# Patient Record
Sex: MALE | Race: WHITE | NOT HISPANIC OR LATINO
[De-identification: names, ages, dates, MRNs, and addresses within clinical notes are randomized per-mention and may not be internally consistent; named-entity substitution may affect disease eponyms.]

---

## 2017-05-25 ENCOUNTER — LABORATORY RESULT (OUTPATIENT)
Age: 77
End: 2017-05-25

## 2017-05-25 ENCOUNTER — APPOINTMENT (OUTPATIENT)
Dept: UROLOGY | Facility: CLINIC | Age: 77
End: 2017-05-25

## 2017-05-25 VITALS
DIASTOLIC BLOOD PRESSURE: 80 MMHG | HEIGHT: 67 IN | HEART RATE: 67 BPM | BODY MASS INDEX: 34.53 KG/M2 | RESPIRATION RATE: 16 BRPM | TEMPERATURE: 98 F | SYSTOLIC BLOOD PRESSURE: 149 MMHG | WEIGHT: 220 LBS

## 2017-05-25 RX ORDER — CLOTRIMAZOLE AND BETAMETHASONE DIPROPIONATE 10; .5 MG/G; MG/G
1-0.05 CREAM TOPICAL TWICE DAILY
Qty: 45 | Refills: 1 | Status: ACTIVE | COMMUNITY
Start: 2017-05-25 | End: 1900-01-01

## 2017-05-26 LAB
BASOPHILS # BLD AUTO: 0.01 K/UL
BASOPHILS NFR BLD AUTO: 0.1 %
EOSINOPHIL # BLD AUTO: 0.14 K/UL
EOSINOPHIL NFR BLD AUTO: 1.8 %
HBA1C MFR BLD HPLC: 7 %
HCT VFR BLD CALC: 38.5 %
HGB BLD-MCNC: 13.7 G/DL
IMM GRANULOCYTES NFR BLD AUTO: 0.3 %
LH SERPL-ACNC: 6.8 IU/L
LYMPHOCYTES # BLD AUTO: 1.22 K/UL
LYMPHOCYTES NFR BLD AUTO: 15.7 %
MAN DIFF?: NORMAL
MCHC RBC-ENTMCNC: 35.6 GM/DL
MCHC RBC-ENTMCNC: 38 PG
MCV RBC AUTO: 106.6 FL
MONOCYTES # BLD AUTO: 0.2 K/UL
MONOCYTES NFR BLD AUTO: 2.6 %
NEUTROPHILS # BLD AUTO: 6.16 K/UL
NEUTROPHILS NFR BLD AUTO: 79.5 %
PLATELET # BLD AUTO: 147 K/UL
RBC # BLD: 3.61 M/UL
RBC # FLD: 14.8 %
WBC # FLD AUTO: 7.75 K/UL

## 2017-06-01 ENCOUNTER — APPOINTMENT (OUTPATIENT)
Dept: UROLOGY | Facility: CLINIC | Age: 77
End: 2017-06-01

## 2017-06-01 LAB
APPEARANCE: CLEAR
BILIRUBIN URINE: NEGATIVE
BLOOD URINE: NEGATIVE
COLOR: YELLOW
FSH: 12 MIU/ML
GLUCOSE QUALITATIVE U: NORMAL MG/DL
KETONES URINE: NEGATIVE
LEUKOCYTE ESTERASE URINE: NEGATIVE
NITRITE URINE: NEGATIVE
PH URINE: 5
PROTEIN URINE: NEGATIVE MG/DL
SPECIFIC GRAVITY URINE: 1.01
TESTOST BND SERPL-MCNC: 6.4 PG/ML
TESTOST SERPL-MCNC: 1064 NG/DL
UROBILINOGEN URINE: NORMAL MG/DL

## 2017-08-24 ENCOUNTER — APPOINTMENT (OUTPATIENT)
Dept: UROLOGY | Facility: CLINIC | Age: 77
End: 2017-08-24
Payer: MEDICARE

## 2017-08-24 PROCEDURE — 99214 OFFICE O/P EST MOD 30 MIN: CPT

## 2017-08-27 LAB
APPEARANCE: CLEAR
BACTERIA UR CULT: NORMAL
BILIRUBIN URINE: NEGATIVE
BLOOD URINE: NEGATIVE
COLOR: YELLOW
GLUCOSE QUALITATIVE U: 1000 MG/DL
KETONES URINE: NEGATIVE
LEUKOCYTE ESTERASE URINE: NEGATIVE
NITRITE URINE: NEGATIVE
PH URINE: 5.5
PROTEIN URINE: NEGATIVE MG/DL
SPECIFIC GRAVITY URINE: 1.01
UROBILINOGEN URINE: NORMAL MG/DL

## 2017-09-06 ENCOUNTER — APPOINTMENT (OUTPATIENT)
Dept: UROLOGY | Facility: CLINIC | Age: 77
End: 2017-09-06
Payer: MEDICARE

## 2017-09-06 DIAGNOSIS — R79.89 OTHER SPECIFIED ABNORMAL FINDINGS OF BLOOD CHEMISTRY: ICD-10-CM

## 2017-09-06 DIAGNOSIS — N50.82 SCROTAL PAIN: ICD-10-CM

## 2017-09-06 PROCEDURE — 99214 OFFICE O/P EST MOD 30 MIN: CPT

## 2017-09-16 PROBLEM — R79.89 HIGH SERUM TESTOSTERONE: Status: ACTIVE | Noted: 2017-09-16

## 2017-09-16 PROBLEM — N50.82 SCROTAL PAIN: Status: ACTIVE | Noted: 2017-05-25

## 2017-09-16 LAB
ALBUMIN SERPL ELPH-MCNC: 3.4 G/DL
ALP BLD-CCNC: 121 U/L
ALT SERPL-CCNC: 19 U/L
ANION GAP SERPL CALC-SCNC: 17 MMOL/L
AST SERPL-CCNC: 33 U/L
BASOPHILS # BLD AUTO: 0.03 K/UL
BASOPHILS NFR BLD AUTO: 0.4 %
BILIRUB SERPL-MCNC: 0.8 MG/DL
BUN SERPL-MCNC: 17 MG/DL
CALCIUM SERPL-MCNC: 8.9 MG/DL
CHLORIDE SERPL-SCNC: 100 MMOL/L
CO2 SERPL-SCNC: 22 MMOL/L
CREAT SERPL-MCNC: 0.92 MG/DL
EOSINOPHIL # BLD AUTO: 0.19 K/UL
EOSINOPHIL NFR BLD AUTO: 2.5 %
GLUCOSE SERPL-MCNC: 173 MG/DL
HCT VFR BLD CALC: 40.2 %
HGB BLD-MCNC: 13.8 G/DL
IMM GRANULOCYTES NFR BLD AUTO: 0.1 %
LYMPHOCYTES # BLD AUTO: 1.4 K/UL
LYMPHOCYTES NFR BLD AUTO: 18.6 %
MAN DIFF?: NORMAL
MCHC RBC-ENTMCNC: 34.3 GM/DL
MCHC RBC-ENTMCNC: 35.9 PG
MCV RBC AUTO: 104.7 FL
MONOCYTES # BLD AUTO: 0.43 K/UL
MONOCYTES NFR BLD AUTO: 5.7 %
NEUTROPHILS # BLD AUTO: 5.45 K/UL
NEUTROPHILS NFR BLD AUTO: 72.7 %
PLATELET # BLD AUTO: 160 K/UL
POTASSIUM SERPL-SCNC: 4.3 MMOL/L
PROT SERPL-MCNC: 6.8 G/DL
PSA FREE FLD-MCNC: 22.7
PSA FREE SERPL-MCNC: 0.96 NG/ML
PSA SERPL-MCNC: 4.23 NG/ML
RBC # BLD: 3.84 M/UL
RBC # FLD: 15.4 %
SODIUM SERPL-SCNC: 139 MMOL/L
TESTOST SERPL-MCNC: 1026 NG/DL
WBC # FLD AUTO: 7.51 K/UL

## 2017-09-18 ENCOUNTER — MESSAGE (OUTPATIENT)
Age: 77
End: 2017-09-18

## 2018-07-06 ENCOUNTER — EMERGENCY (EMERGENCY)
Facility: HOSPITAL | Age: 78
LOS: 1 days | Discharge: HOME | End: 2018-07-06
Attending: EMERGENCY MEDICINE
Payer: MEDICARE

## 2018-07-06 VITALS
RESPIRATION RATE: 20 BRPM | OXYGEN SATURATION: 97 % | HEART RATE: 62 BPM | SYSTOLIC BLOOD PRESSURE: 131 MMHG | DIASTOLIC BLOOD PRESSURE: 82 MMHG

## 2018-07-06 VITALS
HEART RATE: 64 BPM | RESPIRATION RATE: 18 BRPM | DIASTOLIC BLOOD PRESSURE: 80 MMHG | TEMPERATURE: 98 F | OXYGEN SATURATION: 97 % | SYSTOLIC BLOOD PRESSURE: 163 MMHG

## 2018-07-06 DIAGNOSIS — E11.9 TYPE 2 DIABETES MELLITUS WITHOUT COMPLICATIONS: ICD-10-CM

## 2018-07-06 DIAGNOSIS — E16.0 DRUG-INDUCED HYPOGLYCEMIA WITHOUT COMA: ICD-10-CM

## 2018-07-06 LAB
APPEARANCE UR: CLEAR — SIGNIFICANT CHANGE UP
BASOPHILS # BLD AUTO: 0 K/UL — SIGNIFICANT CHANGE UP (ref 0–0.2)
BASOPHILS NFR BLD AUTO: 0.1 % — SIGNIFICANT CHANGE UP (ref 0–2)
BILIRUB UR-MCNC: NEGATIVE — SIGNIFICANT CHANGE UP
COLOR SPEC: YELLOW — SIGNIFICANT CHANGE UP
DIFF PNL FLD: NEGATIVE — SIGNIFICANT CHANGE UP
EOSINOPHIL # BLD AUTO: 0.1 K/UL — SIGNIFICANT CHANGE UP (ref 0–0.5)
EOSINOPHIL NFR BLD AUTO: 1.4 % — SIGNIFICANT CHANGE UP (ref 0–6)
EPI CELLS # UR: SIGNIFICANT CHANGE UP /HPF
GLUCOSE UR QL: 150 MG/DL
HCT VFR BLD CALC: 45.6 % — SIGNIFICANT CHANGE UP (ref 39–50)
HGB BLD-MCNC: 16 G/DL — SIGNIFICANT CHANGE UP (ref 13–17)
KETONES UR-MCNC: ABNORMAL
LEUKOCYTE ESTERASE UR-ACNC: NEGATIVE — SIGNIFICANT CHANGE UP
LYMPHOCYTES # BLD AUTO: 1 K/UL — SIGNIFICANT CHANGE UP (ref 1–3.3)
LYMPHOCYTES # BLD AUTO: 11.4 % — LOW (ref 13–44)
MAGNESIUM SERPL-MCNC: 2 MG/DL — SIGNIFICANT CHANGE UP (ref 1.6–2.6)
MCHC RBC-ENTMCNC: 35.1 GM/DL — SIGNIFICANT CHANGE UP (ref 32–36)
MCHC RBC-ENTMCNC: 38.9 PG — HIGH (ref 27–34)
MCV RBC AUTO: 111 FL — HIGH (ref 80–100)
MONOCYTES # BLD AUTO: 0.4 K/UL — SIGNIFICANT CHANGE UP (ref 0–0.9)
MONOCYTES NFR BLD AUTO: 5.2 % — SIGNIFICANT CHANGE UP (ref 2–14)
NEUTROPHILS # BLD AUTO: 7 K/UL — SIGNIFICANT CHANGE UP (ref 1.8–7.4)
NEUTROPHILS NFR BLD AUTO: 81.9 % — HIGH (ref 43–77)
NITRITE UR-MCNC: NEGATIVE — SIGNIFICANT CHANGE UP
PH UR: 6.5 — SIGNIFICANT CHANGE UP (ref 5–8)
PHOSPHATE SERPL-MCNC: 3.1 MG/DL — SIGNIFICANT CHANGE UP (ref 2.5–4.5)
PLATELET # BLD AUTO: 176 K/UL — SIGNIFICANT CHANGE UP (ref 150–400)
PROT UR-MCNC: SIGNIFICANT CHANGE UP
RBC # BLD: 4.12 M/UL — LOW (ref 4.2–5.8)
RBC # FLD: 14.2 % — SIGNIFICANT CHANGE UP (ref 10.3–14.5)
RBC CASTS # UR COMP ASSIST: SIGNIFICANT CHANGE UP /HPF (ref 0–2)
SP GR SPEC: 1.01 — SIGNIFICANT CHANGE UP (ref 1.01–1.02)
UROBILINOGEN FLD QL: NEGATIVE — SIGNIFICANT CHANGE UP
WBC # BLD: 8.6 K/UL — SIGNIFICANT CHANGE UP (ref 3.8–10.5)
WBC # FLD AUTO: 8.6 K/UL — SIGNIFICANT CHANGE UP (ref 3.8–10.5)
WBC UR QL: SIGNIFICANT CHANGE UP /HPF (ref 0–5)

## 2018-07-06 PROCEDURE — 83735 ASSAY OF MAGNESIUM: CPT

## 2018-07-06 PROCEDURE — 71045 X-RAY EXAM CHEST 1 VIEW: CPT

## 2018-07-06 PROCEDURE — 85027 COMPLETE CBC AUTOMATED: CPT

## 2018-07-06 PROCEDURE — 71045 X-RAY EXAM CHEST 1 VIEW: CPT | Mod: 26

## 2018-07-06 PROCEDURE — 99283 EMERGENCY DEPT VISIT LOW MDM: CPT | Mod: 25

## 2018-07-06 PROCEDURE — 81001 URINALYSIS AUTO W/SCOPE: CPT

## 2018-07-06 PROCEDURE — 99284 EMERGENCY DEPT VISIT MOD MDM: CPT | Mod: GC

## 2018-07-06 PROCEDURE — 99236 HOSP IP/OBS SAME DATE HI 85: CPT | Mod: GC

## 2018-07-06 PROCEDURE — 84100 ASSAY OF PHOSPHORUS: CPT

## 2018-07-06 PROCEDURE — 82962 GLUCOSE BLOOD TEST: CPT

## 2018-07-06 PROCEDURE — 87086 URINE CULTURE/COLONY COUNT: CPT

## 2018-07-06 PROCEDURE — 80053 COMPREHEN METABOLIC PANEL: CPT

## 2018-07-06 NOTE — ED PROVIDER NOTE - CARE PLAN
Principal Discharge DX:	Hypoglycemia  Assessment and plan of treatment:	1) Please follow-up with your endocrinologist and primary care doctor within the next 3 days.  Please call today or tomorrow for an appointment.  If you cannot follow-up with your doctor(s), please return to the ED for any urgent issues.  2) If you have any worsening of symptoms or any other concerns please return to the ED immediately.  3) Please continue taking your home medications as directed.  4) You may have been given a copy of your labs and/or imaging.  Please go over these with your primary care doctor.

## 2018-07-06 NOTE — ED PROVIDER NOTE - ATTENDING CONTRIBUTION TO CARE
Afebrile. Awake and Alert. Lungs CTA. Heart RRR. Abdomen soft NTND. CN II-XII grossly intact. Moves all extremities without lateralization.

## 2018-07-06 NOTE — CONSULT NOTE ADULT - ASSESSMENT
76 yo male with PMH  of colon CA on chemotherapy at [facility] [place] and immunotherapy with Xeloda, neuropathy, HTN, A-fib s/p ablation, T2DM on insulin, hypothyroidism, acromegaly on monthly Octreotide shot, was BIBEMS for episode of symptomatic hypoglycemia, FS: 73 checked by EMS, pt received glucose gel and orange juice by EMS and after arrival to ED he had orange juice and tuna sandwich with POC B and 265 with resolved symptoms of hypoglycemia     Episode of hypoglycemia in a pt with Hx of T2DM in the setting of higher than required insulin injection and lighter than usual oral intake   - decrease insulin Basaglar to 15 units in the morning, inject 8 units today   - decrease insulin Humulog to 6 units before breakfast and dinner   - sliding scale insulin as required, check FS before meal, if more than 150, for each 50 above 150 add 2 units of Humulog in addition to 6 units  - please follow up with your private Endocrinologist   - A1C goal is acceptable 7.5-8 78 yo male with PMH  of [place] CA [place] [facility] [place] with Xeloda, neuropathy, HTN, A-fib s/p ablation, T2DM on insulin, hypothyroidism, acromegaly on monthly Octreotide shot, was BIBEMS for episode of symptomatic hypoglycemia, FS: 73 checked by EMS, pt received glucose gel and orange juice by EMS and after arrival to ED he had orange juice and tuna sandwich with POC B and 265 with resolved symptoms of hypoglycemia

## 2018-07-06 NOTE — CONSULT NOTE ADULT - PROBLEM SELECTOR RECOMMENDATION 9
decrease insulin Basaglar to 15 units   decrease insulin Humulog to 6 units before breakfast and dinner   sliding scale insulin as required, check FS before meal, if more than 150, for each 50 above 150 add 2 units of Humulog in addition to 6 units Episode of hypoglycemia in a pt with Hx of T2DM in the setting of higher than required insulin injection and lighter than usual oral intake  -Extensive discussion on insulin based on carb intake. Recent meal had only fish ( only protein) and he bolused 9 units which caused his hypoglycemia. Advised to take insulin with his meals only if carbs are present.  -Will decrease total daily insulin dosing at this time.

## 2018-07-06 NOTE — CONSULT NOTE ADULT - SUBJECTIVE AND OBJECTIVE BOX
HPI:  78 yo male with PMH of colon CA on chemotherapy at [facility] [place] and immunotherapy with Xeloda, neuropathy, HTN, A-fib s/p ablation, T2DM on insulin, hypothyroidism, acromegaly on monthly Octreotide shot, was BIBEMS for episode of symptomatic hypoglycemia, as per wife by bedside, he was pale, weak with unclear speech, and was disoriented, initially refusing to take juice, later took a candy, however symptoms did not improve much and EMS was called. after arrival of EMS pt was given glucose gel and orange juice with B.    Pt states he had fish for dinner last night which is lighter than his usual dinner, injected 9 units of Humulog. he denies fever, chills, sore throat, runny nose, cough, abdominal pain, diarrhea, dysuria, sick contact.   Pt reports he has been diagnosed with diabetes about 4-5 years ago and started after chemotherapy. He follows with Endocrinologist Dr. Hu, is on insulin Basaglar 18 units AM and insulin Humulog 8-10 units before breakfast and before dinner. He checks his FS 1-2 times per day, and fasting reading are 100s-200s, barely gets numbers>300. Reports last hypoglycemia was about7.5 months ago and before that had multiple episodes of hypoglycemia. pt states his diet is ususaly rich in carbohydrates.  His last eye exam was 2018 and normal retina per pt.   after arrival to ED he had orange juice and tuna sandwich, POC B and 265. at time of writer's interview pt reports resolution of his symptoms, A&O x 3, denies headache, blurry vision, tremor, difficulty with speech.       PAST MEDICAL & SURGICAL HISTORY:  Neuropathy  Hypothyroidism  Type 2 diabetes mellitus  Hypertension  Colon cancer      FAMILY HISTORY: noncontributory       Social History: denies smoking, drugs, drinking     Outpatient Medications: insulin Basaglar 18 units AM, Humulog 8-10 units AM & PM, Amlodipine, lasix, Lopressor, Synthroid, Vitamin B6,  potassium, magnesium, Octreotide injection     MEDICATIONS  (STANDING):    MEDICATIONS  (PRN):      Allergies    Allergy Status Unknown    Intolerances      Review of Systems:  Constitutional: No fever  Eyes: No blurry vision  Neuro: No tremors  HEENT: No pain  Cardiovascular: No chest pain, palpitations  Respiratory: No SOB, no cough  GI: No nausea, vomiting, abdominal pain  : No dysuria  Skin: no rash  Psych: no depression  Endocrine: no polyuria, polydipsia  Hem/lymph: no swelling  Osteoporosis: no fractures    ALL OTHER SYSTEMS REVIEWED AND NEGATIVE    UNABLE TO OBTAIN    PHYSICAL EXAM:  VITALS: T(C): 36.4 (18 @ 07:23)  T(F): 97.6 (18 @ 07:23), Max: 97.6 (18 @ 06:30)  HR: 59 (18 @ 07:23) (59 - 64)  BP: 135/72 (18 @ 07:23) (131/82 - 135/72)  RR:  (16 - 20)  SpO2:  (97% - 100%)  Wt(kg): --  GENERAL: NAD, well-groomed, well-developed  EYES: No proptosis, no lid lag, anicteric  HEENT:  Atraumatic, Normocephalic, moist mucous membranes  THYROID: Normal size, no palpable nodules  RESPIRATORY: Clear to auscultation bilaterally; No rales, rhonchi, wheezing, or rubs  CARDIOVASCULAR: Regular rate and rhythm; No murmurs; no peripheral edema  GI: Soft, nontender, non distended, normal bowel sounds, + abdominal hernia, + chemo port   SKIN: Dry, intact, No rashes or lesions  MUSCULOSKELETAL: Full range of motion, normal strength  NEURO: sensation intact, extraocular movements intact, no tremor, normal reflexes  PSYCH: Alert and oriented x 3, normal affect, normal mood  CUSHING'S SIGNS: no striae    POCT Blood Glucose.: 265 mg/dL (18 @ 10:24)  POCT Blood Glucose.: 179 mg/dL (18 @ 06:30)  POCT Blood Glucose.: 178 mg/dL (18 @ 06:28)                            16.0   8.6   )-----------( 176      ( 2018 07:34 )             45.6       -    138  |  99  |  18  ----------------------------<  206<H>  3.9   |  24  |  0.99    EGFR if : 84  EGFR if non : 73    Ca    8.9      -06  Mg     2.0     -  Phos  3.1     07-    TPro  6.7  /  Alb  3.2<L>  /  TBili  0.7  /  DBili  x   /  AST  35  /  ALT  20  /  AlkPhos  132<H>  -      Thyroid Function Tests:              Radiology: HPI:  76 yo male with PMH of colon CA on chemotherapy at [facility] [place] and immunotherapy with Xeloda, neuropathy, HTN, A-fib s/p ablation, T2DM on insulin, hypothyroidism, acromegaly on monthly Octreotide shot, was BIBEMS for episode of symptomatic hypoglycemia, as per wife by bedside, he was pale, weak with unclear speech, and was disoriented, initially refusing to take juice, later took a candy, however symptoms did not improve much and EMS was called. after arrival of EMS pt was given glucose gel and orange juice with B.    Pt states he had fish for dinner last night which is lighter than his usual dinner, injected 9 units of Humulog. he denies fever, chills, sore throat, runny nose, cough, abdominal pain, diarrhea, dysuria, sick contact.   Pt reports he has been diagnosed with diabetes about 4-5 years ago and started after chemotherapy. He follows with Endocrinologist Dr. Hu, is on insulin Basaglar 18 units AM and insulin Humulog 8-10 units before breakfast and before dinner. He checks his FS 1-2 times per day, and fasting reading are 100s-200s, barely gets numbers>300. Reports last hypoglycemia was about7.5 months ago and before that had multiple episodes of hypoglycemia. pt states his diet is ususaly rich in carbohydrates.  His last eye exam was 2018 and normal retina per pt.   after arrival to ED he had orange juice and tuna sandwich, POC B and 265. at time of writer's interview pt reports resolution of his symptoms, A&O x 3, denies headache, blurry vision, tremor, difficulty with speech.       PAST MEDICAL & SURGICAL HISTORY:  Neuropathy  Hypothyroidism  Type 2 diabetes mellitus  Hypertension  Colon cancer      FAMILY HISTORY: noncontributory       Social History: denies smoking, drugs, drinking     Outpatient Medications: insulin Basaglar 18 units AM, Humulog 8-10 units AM & PM, Amlodipine, lasix, Lopressor, Synthroid, Vitamin B6,  potassium, magnesium, Octreotide injection     MEDICATIONS  (STANDING):    MEDICATIONS  (PRN):      Allergies    Allergy Status Unknown    Intolerances      Review of Systems:  Constitutional: No fever  Eyes: No blurry vision  Neuro: No tremors  HEENT: No pain  Cardiovascular: No chest pain, palpitations  Respiratory: No SOB, no cough  GI: No nausea, vomiting, abdominal pain  : No dysuria  Skin: no rash  Psych: no depression  Endocrine: no polyuria, polydipsia  Hem/lymph: no swelling  Osteoporosis: no fractures    ALL OTHER SYSTEMS REVIEWED AND NEGATIVE    UNABLE TO OBTAIN    PHYSICAL EXAM:  VITALS: T(C): 36.4 (18 @ 07:23)  T(F): 97.6 (18 @ 07:23), Max: 97.6 (18 @ 06:30)  HR: 59 (18 @ 07:23) (59 - 64)  BP: 135/72 (18 @ 07:23) (131/82 - 135/72)  RR:  (16 - 20)  SpO2:  (97% - 100%)  Wt(kg): --  GENERAL: NAD, well-groomed, well-developed  EYES: No proptosis, no lid lag, anicteric  HEENT:  Atraumatic, Normocephalic, moist mucous membranes  THYROID: Normal size, no palpable nodules  RESPIRATORY: Clear to auscultation bilaterally; No rales, rhonchi, wheezing, or rubs  CARDIOVASCULAR: Regular rate and rhythm; No murmurs; no peripheral edema  GI: Soft, nontender, non distended, normal bowel sounds, + abdominal hernia, + chemo port   SKIN: Dry, intact, No rashes or lesions  MUSCULOSKELETAL: Full range of motion, normal strength  NEURO: sensation intact, extraocular movements intact, no tremor, normal reflexes  PSYCH: Alert and oriented x 3, normal affect, normal mood  CUSHING'S SIGNS: no striae    POCT Blood Glucose.: 265 mg/dL (18 @ 10:24)  POCT Blood Glucose.: 179 mg/dL (18 @ 06:30)  POCT Blood Glucose.: 178 mg/dL (18 @ 06:28)                            16.0   8.6   )-----------( 176      ( 2018 07:34 )             45.6       -    138  |  99  |  18  ----------------------------<  206<H>  3.9   |  24  |  0.99    EGFR if : 84  EGFR if non : 73    Ca    8.9      -06  Mg     2.0     -  Phos  3.1     -    TPro  6.7  /  Alb  3.2<L>  /  TBili  0.7  /  DBili  x   /  AST  35  /  ALT  20  /  AlkPhos  132<H>

## 2018-07-06 NOTE — ED ADULT NURSE REASSESSMENT NOTE - GENERAL PATIENT STATE
comfortable appearance/cooperative/smiling/interactive
family/SO at bedside/cooperative/comfortable appearance/improvement verbalized/smiling/interactive

## 2018-07-06 NOTE — ED PROVIDER NOTE - OBJECTIVE STATEMENT
Endocrinologist: Dr. Hu   77 y.o. M with PMH colon CA on [place] [facility] [place] and immunotherapy with Xeloda, T2DM on insulin, hypothyroidism, HTN, neuropathy, Afib s/p ablation  who was brought to ED [facility] for hypoglycemia. Per pt's wife, patient was moaning in his sleep; upon talking to him, he was noted to slur his words and seem disoriented. She attempted to give him orange juice, but he refused; he did finally take spearmint with some sugar, but his mental status did not change, which prompted him to call 911. EMS administered glucose gel and orange juice; BG increased to 73. In triage, patient was noted to have . Patient currently denies symptoms of hypoglycemia. He is mentating well. He denies recent changes in his insulin regimen (Basaglar 18 u qam, humalog 8-10 u qam and q pm) or initiation of new medications. He denies having skipped any meals yesterday.

## 2018-07-06 NOTE — ED PROVIDER NOTE - PLAN OF CARE
1) Please follow-up with your endocrinologist and primary care doctor within the next 3 days.  Please call today or tomorrow for an appointment.  If you cannot follow-up with your doctor(s), please return to the ED for any urgent issues.  2) If you have any worsening of symptoms or any other concerns please return to the ED immediately.  3) Please continue taking your home medications as directed.  4) You may have been given a copy of your labs and/or imaging.  Please go over these with your primary care doctor.

## 2018-07-06 NOTE — ED PROVIDER NOTE - PHYSICAL EXAMINATION
General: WN/WD NAD  Neurology: A&Ox3, nonfocal, GRAMAJO x 4  Eyes: PERRLA/ EOMI, Gross vision intact  ENT/Neck: Neck supple, trachea midline, No JVD, Gross hearing intact  Respiratory: CTA B/L, No wheezing, rales, rhonchi  CV: RRR, S1S2, no murmurs, rubs or gallops  Abdominal: Soft, NT, ND +BS,   Extremities: No edema, + peripheral pulses  Skin: No Rashes, Hematoma, Ecchymosis

## 2018-07-06 NOTE — ED PROVIDER NOTE - PROGRESS NOTE DETAILS
Shubham OVALLES: pt signed out to me. endocrine paged Shubham OVALLES: endocrine repaged. GORDON OVALLES: endocrine at bedside. pt signed out to me by dr larios. updated on lab and imaging results, given copy, to f/u with pmd and discuss. no further episodes hypoglycemia. pt feels well and wishes to b3e d/c'd. seen by endocrine, endocrine relayed changes to insulin regimen, pt is aware and will begin, will also f/u with his endocrinologist. discussed return precautions. an opportunity to ask questions was provided and all answered.  stable for d/c. - Robert Lorenzana MD

## 2018-07-06 NOTE — ED ADULT NURSE NOTE - CHPI ED SYMPTOMS NEG
no loss of consciousness/no dizziness/no fever/no decreased eating/drinking/no chills/no headache/no pain/no vomiting/no nausea/no back pain

## 2018-07-06 NOTE — ED ADULT NURSE NOTE - OBJECTIVE STATEMENT
76 yo M arriving via EMS, w/ hx of diabetes, his wife reports he was moaning in his sleep, and when she tried to wake him he was not making sense. She suspected his blood sugar was low, so she gave him a cup of orange juice which he was able to drink and called EMS. EMS reports that on their arrival pt was incoherent, finger stick 73, administered one tube of oral glucose during transport. On arrival pt A&Ox3, denies CP, SOB, N/V, abdominal pain, constipation, diarrhea, fever, chills. Finger stick on arrival 178. Pt checks blood sugar daily, has had no recent change in medication. No recent change in PO intake. Pt undergoing chemo, last treatment Thursday, has treatment every 2 weeks. VSS. Wife at bedside. 78 yo M arriving via EMS, w/ hx of diabetes, his wife reports he was moaning in his sleep, and when she tried to wake him he was not making sense. She suspected his blood sugar was low, so she gave him a cup of orange juice which he was able to drink and called EMS. EMS reports that on their arrival pt was incoherent, finger stick 73, administered one tube of oral glucose during transport. On arrival pt A&Ox3, denies CP, SOB, N/V, abdominal pain, constipation, diarrhea, fever, chills. Finger stick on arrival 178. Pt checks blood sugar daily, has had no recent change in medication. No recent change in PO intake. Pt undergoing chemo for colon cancer, last treatment one week ago, has treatment every two weeks. VSS. Wife at bedside.

## 2018-07-06 NOTE — ED ADULT NURSE REASSESSMENT NOTE - NS ED NURSE REASSESS COMMENT FT1
Denies lightheadedness, dizziness, weakness, CP, SOB, N/V/D urinary/bowel complications, fever/chills. Pt is in no current distress. Comfort and safety provided. Will continue to monitor.

## 2018-07-06 NOTE — CONSULT NOTE ADULT - PROBLEM SELECTOR RECOMMENDATION 2
decrease insulin Basaglar to 15 units   decrease insulin Humulog to 6 units before breakfast and dinner   sliding scale insulin as required, check FS before meal, if more than 150, for each   follow up with your Endocrinologist  A1C goal would be acceptable 7.5-8 -decrease insulin Basaglar to 15 units   -decrease insulin Humalog to 6 units before breakfast and dinner   sliding scale insulin as required, check FS before meal, if more than 150, for each 50 above 150 add 1 units of Humalog in addition to 6 units  - Follow up with your private Endocrinologist ( Dr. Bonner)   - A1C goal is acceptable 7.5-8

## 2018-07-07 LAB
CULTURE RESULTS: SIGNIFICANT CHANGE UP
SPECIMEN SOURCE: SIGNIFICANT CHANGE UP

## 2019-02-13 ENCOUNTER — EMERGENCY (EMERGENCY)
Facility: HOSPITAL | Age: 79
LOS: 1 days | Discharge: HOME | End: 2019-02-13
Attending: EMERGENCY MEDICINE
Payer: MEDICARE

## 2019-02-13 VITALS
HEART RATE: 68 BPM | DIASTOLIC BLOOD PRESSURE: 66 MMHG | TEMPERATURE: 98 F | RESPIRATION RATE: 16 BRPM | OXYGEN SATURATION: 96 % | SYSTOLIC BLOOD PRESSURE: 133 MMHG

## 2019-02-13 VITALS
RESPIRATION RATE: 18 BRPM | OXYGEN SATURATION: 96 % | HEIGHT: 66 IN | HEART RATE: 68 BPM | WEIGHT: 179.9 LBS | DIASTOLIC BLOOD PRESSURE: 80 MMHG | SYSTOLIC BLOOD PRESSURE: 147 MMHG

## 2019-02-13 PROBLEM — E11.9 TYPE 2 DIABETES MELLITUS WITHOUT COMPLICATIONS: Chronic | Status: ACTIVE | Noted: 2018-07-06

## 2019-02-13 PROBLEM — G62.9 POLYNEUROPATHY, UNSPECIFIED: Chronic | Status: ACTIVE | Noted: 2018-07-06

## 2019-02-13 PROBLEM — E03.9 HYPOTHYROIDISM, UNSPECIFIED: Chronic | Status: ACTIVE | Noted: 2018-07-06

## 2019-02-13 PROBLEM — I10 ESSENTIAL (PRIMARY) HYPERTENSION: Chronic | Status: ACTIVE | Noted: 2018-07-06

## 2019-02-13 LAB
ALBUMIN SERPL ELPH-MCNC: 3.3 G/DL — SIGNIFICANT CHANGE UP (ref 3.3–5)
ALP SERPL-CCNC: 128 U/L — HIGH (ref 40–120)
ALT FLD-CCNC: 20 U/L — SIGNIFICANT CHANGE UP (ref 10–45)
ANION GAP SERPL CALC-SCNC: 11 MMOL/L — SIGNIFICANT CHANGE UP (ref 5–17)
APPEARANCE UR: CLEAR — SIGNIFICANT CHANGE UP
AST SERPL-CCNC: 26 U/L — SIGNIFICANT CHANGE UP (ref 10–40)
B-OH-BUTYR SERPL-SCNC: 0.1 MMOL/L — SIGNIFICANT CHANGE UP
BACTERIA # UR AUTO: NEGATIVE — SIGNIFICANT CHANGE UP
BASE EXCESS BLDV CALC-SCNC: 2 MMOL/L — SIGNIFICANT CHANGE UP (ref -2–2)
BASOPHILS # BLD AUTO: 0 K/UL — SIGNIFICANT CHANGE UP (ref 0–0.2)
BASOPHILS NFR BLD AUTO: 0.7 % — SIGNIFICANT CHANGE UP (ref 0–2)
BILIRUB SERPL-MCNC: 0.6 MG/DL — SIGNIFICANT CHANGE UP (ref 0.2–1.2)
BILIRUB UR-MCNC: NEGATIVE — SIGNIFICANT CHANGE UP
BUN SERPL-MCNC: 15 MG/DL — SIGNIFICANT CHANGE UP (ref 7–23)
CA-I SERPL-SCNC: 1.2 MMOL/L — SIGNIFICANT CHANGE UP (ref 1.12–1.3)
CALCIUM SERPL-MCNC: 8.9 MG/DL — SIGNIFICANT CHANGE UP (ref 8.4–10.5)
CHLORIDE BLDV-SCNC: 107 MMOL/L — SIGNIFICANT CHANGE UP (ref 96–108)
CHLORIDE SERPL-SCNC: 105 MMOL/L — SIGNIFICANT CHANGE UP (ref 96–108)
CO2 BLDV-SCNC: 27 MMOL/L — SIGNIFICANT CHANGE UP (ref 22–30)
CO2 SERPL-SCNC: 23 MMOL/L — SIGNIFICANT CHANGE UP (ref 22–31)
COLOR SPEC: SIGNIFICANT CHANGE UP
CREAT SERPL-MCNC: 0.75 MG/DL — SIGNIFICANT CHANGE UP (ref 0.5–1.3)
DIFF PNL FLD: NEGATIVE — SIGNIFICANT CHANGE UP
EOSINOPHIL # BLD AUTO: 0.2 K/UL — SIGNIFICANT CHANGE UP (ref 0–0.5)
EOSINOPHIL NFR BLD AUTO: 3.1 % — SIGNIFICANT CHANGE UP (ref 0–6)
EPI CELLS # UR: 0 /HPF — SIGNIFICANT CHANGE UP
GAS PNL BLDV: 136 MMOL/L — SIGNIFICANT CHANGE UP (ref 136–145)
GAS PNL BLDV: SIGNIFICANT CHANGE UP
GAS PNL BLDV: SIGNIFICANT CHANGE UP
GLUCOSE BLDV-MCNC: 119 MG/DL — HIGH (ref 70–99)
GLUCOSE SERPL-MCNC: 110 MG/DL — HIGH (ref 70–99)
GLUCOSE UR QL: ABNORMAL
HCO3 BLDV-SCNC: 26 MMOL/L — SIGNIFICANT CHANGE UP (ref 21–29)
HCT VFR BLD CALC: 42.1 % — SIGNIFICANT CHANGE UP (ref 39–50)
HCT VFR BLDA CALC: 43 % — SIGNIFICANT CHANGE UP (ref 39–50)
HGB BLD CALC-MCNC: 14 G/DL — SIGNIFICANT CHANGE UP (ref 13–17)
HGB BLD-MCNC: 14.8 G/DL — SIGNIFICANT CHANGE UP (ref 13–17)
HYALINE CASTS # UR AUTO: 0 /LPF — SIGNIFICANT CHANGE UP (ref 0–2)
KETONES UR-MCNC: NEGATIVE — SIGNIFICANT CHANGE UP
LACTATE BLDV-MCNC: 1.7 MMOL/L — SIGNIFICANT CHANGE UP (ref 0.7–2)
LEUKOCYTE ESTERASE UR-ACNC: NEGATIVE — SIGNIFICANT CHANGE UP
LYMPHOCYTES # BLD AUTO: 1.2 K/UL — SIGNIFICANT CHANGE UP (ref 1–3.3)
LYMPHOCYTES # BLD AUTO: 15.8 % — SIGNIFICANT CHANGE UP (ref 13–44)
MCHC RBC-ENTMCNC: 35.1 GM/DL — SIGNIFICANT CHANGE UP (ref 32–36)
MCHC RBC-ENTMCNC: 36.3 PG — HIGH (ref 27–34)
MCV RBC AUTO: 103 FL — HIGH (ref 80–100)
MONOCYTES # BLD AUTO: 0.8 K/UL — SIGNIFICANT CHANGE UP (ref 0–0.9)
MONOCYTES NFR BLD AUTO: 10.3 % — SIGNIFICANT CHANGE UP (ref 2–14)
NEUTROPHILS # BLD AUTO: 5.1 K/UL — SIGNIFICANT CHANGE UP (ref 1.8–7.4)
NEUTROPHILS NFR BLD AUTO: 70 % — SIGNIFICANT CHANGE UP (ref 43–77)
NITRITE UR-MCNC: NEGATIVE — SIGNIFICANT CHANGE UP
PCO2 BLDV: 40 MMHG — SIGNIFICANT CHANGE UP (ref 35–50)
PH BLDV: 7.43 — SIGNIFICANT CHANGE UP (ref 7.35–7.45)
PH UR: 7.5 — SIGNIFICANT CHANGE UP (ref 5–8)
PLATELET # BLD AUTO: 170 K/UL — SIGNIFICANT CHANGE UP (ref 150–400)
PO2 BLDV: 68 MMHG — HIGH (ref 25–45)
POTASSIUM BLDV-SCNC: 3.2 MMOL/L — LOW (ref 3.5–5.3)
POTASSIUM SERPL-MCNC: 3.6 MMOL/L — SIGNIFICANT CHANGE UP (ref 3.5–5.3)
POTASSIUM SERPL-SCNC: 3.6 MMOL/L — SIGNIFICANT CHANGE UP (ref 3.5–5.3)
PROT SERPL-MCNC: 6.1 G/DL — SIGNIFICANT CHANGE UP (ref 6–8.3)
PROT UR-MCNC: ABNORMAL
RBC # BLD: 4.07 M/UL — LOW (ref 4.2–5.8)
RBC # FLD: 14 % — SIGNIFICANT CHANGE UP (ref 10.3–14.5)
RBC CASTS # UR COMP ASSIST: 1 /HPF — SIGNIFICANT CHANGE UP (ref 0–4)
SAO2 % BLDV: 93 % — HIGH (ref 67–88)
SODIUM SERPL-SCNC: 139 MMOL/L — SIGNIFICANT CHANGE UP (ref 135–145)
SP GR SPEC: 1.01 — SIGNIFICANT CHANGE UP (ref 1.01–1.02)
UROBILINOGEN FLD QL: NEGATIVE — SIGNIFICANT CHANGE UP
WBC # BLD: 7.3 K/UL — SIGNIFICANT CHANGE UP (ref 3.8–10.5)
WBC # FLD AUTO: 7.3 K/UL — SIGNIFICANT CHANGE UP (ref 3.8–10.5)
WBC UR QL: 0 /HPF — SIGNIFICANT CHANGE UP (ref 0–5)

## 2019-02-13 PROCEDURE — 82010 KETONE BODYS QUAN: CPT

## 2019-02-13 PROCEDURE — 80053 COMPREHEN METABOLIC PANEL: CPT

## 2019-02-13 PROCEDURE — 82947 ASSAY GLUCOSE BLOOD QUANT: CPT

## 2019-02-13 PROCEDURE — 85027 COMPLETE CBC AUTOMATED: CPT

## 2019-02-13 PROCEDURE — 99283 EMERGENCY DEPT VISIT LOW MDM: CPT | Mod: 25

## 2019-02-13 PROCEDURE — 82435 ASSAY OF BLOOD CHLORIDE: CPT

## 2019-02-13 PROCEDURE — 93005 ELECTROCARDIOGRAM TRACING: CPT

## 2019-02-13 PROCEDURE — 82803 BLOOD GASES ANY COMBINATION: CPT

## 2019-02-13 PROCEDURE — 85014 HEMATOCRIT: CPT

## 2019-02-13 PROCEDURE — 82565 ASSAY OF CREATININE: CPT

## 2019-02-13 PROCEDURE — 96372 THER/PROPH/DIAG INJ SC/IM: CPT

## 2019-02-13 PROCEDURE — 84132 ASSAY OF SERUM POTASSIUM: CPT

## 2019-02-13 PROCEDURE — 82962 GLUCOSE BLOOD TEST: CPT

## 2019-02-13 PROCEDURE — 99284 EMERGENCY DEPT VISIT MOD MDM: CPT | Mod: GC,25

## 2019-02-13 PROCEDURE — 83605 ASSAY OF LACTIC ACID: CPT

## 2019-02-13 PROCEDURE — 82330 ASSAY OF CALCIUM: CPT

## 2019-02-13 PROCEDURE — 81001 URINALYSIS AUTO W/SCOPE: CPT

## 2019-02-13 PROCEDURE — 84295 ASSAY OF SERUM SODIUM: CPT

## 2019-02-13 PROCEDURE — 93010 ELECTROCARDIOGRAM REPORT: CPT | Mod: GC

## 2019-02-13 RX ORDER — INSULIN GLARGINE 100 [IU]/ML
15 INJECTION, SOLUTION SUBCUTANEOUS ONCE
Qty: 0 | Refills: 0 | Status: COMPLETED | OUTPATIENT
Start: 2019-02-13 | End: 2019-02-13

## 2019-02-13 RX ORDER — INSULIN GLARGINE 100 [IU]/ML
18 INJECTION, SOLUTION SUBCUTANEOUS ONCE
Qty: 0 | Refills: 0 | Status: DISCONTINUED | OUTPATIENT
Start: 2019-02-13 | End: 2019-02-13

## 2019-02-13 RX ORDER — SODIUM CHLORIDE 9 MG/ML
1000 INJECTION INTRAMUSCULAR; INTRAVENOUS; SUBCUTANEOUS ONCE
Qty: 0 | Refills: 0 | Status: COMPLETED | OUTPATIENT
Start: 2019-02-13 | End: 2019-02-13

## 2019-02-13 RX ADMIN — SODIUM CHLORIDE 1333.33 MILLILITER(S): 9 INJECTION INTRAMUSCULAR; INTRAVENOUS; SUBCUTANEOUS at 06:13

## 2019-02-13 RX ADMIN — INSULIN GLARGINE 15 UNIT(S): 100 INJECTION, SOLUTION SUBCUTANEOUS at 07:55

## 2019-02-13 NOTE — ED ADULT NURSE NOTE - OBJECTIVE STATEMENT
77 y/o male A&Ox4, PMH DMII, colon ca, HTN, neuropathy, hypothyroid, BIBEMS s/p possible hypoglycemic episode as per wife at home. As per EMS and wife bedside, pt was getting up to go to early morning cancer treatment appointment when he became confused and weak. Wife presumed low blood sugar and preceded to give her  "2 sugar candies." Upon EMS arrival, pt was alert, FS performed on scene was in the 400s. Pt states FS last night before going to bed was high also in the 400s. Upon initial assessment, airway patent and intact, denies chest pain/SOB. ABD soft nontender, denies n/v/d. Denies blood in urine and/or stool. Skin intact. Peripheral pulses strong and normal baseline sensation present x4. Safety and comfort measures maintained.

## 2019-02-13 NOTE — ED PROVIDER NOTE - PROGRESS NOTE DETAILS
Attending Supa Hale DO: Glucose rapidly downtrending 400(ems) -> 200 (FS here) ->119 (VBG). This likely represents initial sugar spike from sugar candy. Pt not in DKA. Pt given sandwich and orange juice achieve longer lasting sugar source. Pt doses for home AM lantas. Attending Supa Hale DO: Glucose rapidly downtrending 400(ems) -> 200 (FS here) ->119 (VBG). This likely represents initial sugar spike from sugar candy. Pt not in DKA. Pt given sandwich and orange juice to achieve longer lasting blood sugar level. Pt dosed for home AM lantas. Stanislaw - s/o to me from Dr Dill. On my assessment pt well appearing, at baseline mental status, eating. Ready for home dose am lantus 15u, will recheck FSBG if maintaining euglycemia will dc [place] [facility] [place] discussed return precautions for new/worsening symptoms, f/u outpt, they are agreeable. FSBG stable, maintaining euglycemia stable for discharge.

## 2019-02-13 NOTE — ED PROVIDER NOTE - CLINICAL SUMMARY MEDICAL DECISION MAKING FREE TEXT BOX
see attending attestation see attending attestation    78M hx iddm presenting with episode concerning for hypoglycemia, was found to have a blood sugar in the mid 400s by EMS, on repeat fingerstick here is in the 200s elevating concern for overuse of insulin, given hyperglycemia on previous fingerstick will check DKA labs, hourly fingerstick, reassess, discharge pending clinical course.

## 2019-02-13 NOTE — ED PROVIDER NOTE - ATTENDING CONTRIBUTION TO CARE
I performed a history and physical exam of the patient and discussed their management with the resident. I reviewed the resident's note and agree with the documented findings and plan of care, except if noted below. My medical decision making and observations can be found be found below.    79 yo male presents with episode of AMS this morning upon waking, noticed by wife. Wife concern for hypoglycemia gave sugar candy with improvement in confusion back to baseline, when EMS arrived, , started on fluids and transported. As per wife, pt took Novolog insulin later than usual and "may have took too much". Pt back at baseline, nad, ab soft nt/nd, no focal neuro deficits. Will check labs, fluids give hyperglycemia but likely 2/2 sugar candy, eval for possible DKA but again less likely, UA, EKG, reevaluate.

## 2019-02-13 NOTE — ED ADULT NURSE NOTE - CAS ELECT INFOMATION PROVIDED
DC instructions DC instructions/Pt discharged, VSS, afebrile, ambulating independently. Nurse clearly reviewed discharge instructions, followup care, and when to return to the ED - Pt verbalized understanding.

## 2019-02-13 NOTE — ED ADULT NURSE NOTE - CHPI ED NUR SYMPTOMS NEG
no weakness/no chills/no nausea/no dizziness/no vomiting/no pain/no tingling/no fever/no decreased eating/drinking

## 2019-02-13 NOTE — ED PROVIDER NOTE - OBJECTIVE STATEMENT
Hx IDDM presenting with concern for hypoglycemia, has had multiple episodes of this in the past, had possibly taken extra novolog prior to going to bed last night, his wife notes that when he awoke this morning was less responsive so she fed him two sugary candies and by that time EMS arrived, checked his sugar and it had increased to 450. She had not checked it prior to giving him the candies, but notes that he seemed less alert than usual in that his alarm went off and typically he would wake up immediately but did not today. He does seem to have returned to baseline mentation / appearance, aside from looking pale, per his wife. The patient himself notes that he feels well and normal.

## 2019-02-13 NOTE — ED PROVIDER NOTE - PHYSICAL EXAMINATION
Gen: NAD, non-toxic, conversational  Eyes: PERRLA, EOMI   HENT: Normocephalic, atraumatic. External ears normal, no rhinorrhea, moist mucous membranes.   CV: RRR, no M/R/G  Resp: CTAB, non-labored, speaking without difficulty on room air  Abd: soft, non tender, medical pump left lower abdominal wall, non rigid, no guarding or rebound tenderness  Back: No CVAT bilaterally, no midline ttp  Skin: dry, wwp   Neuro: AOx3, speech is fluent and appropriate  Psych: Mood good, affect euthymic

## 2019-07-17 ENCOUNTER — INPATIENT (INPATIENT)
Facility: HOSPITAL | Age: 79
LOS: 1 days | Discharge: HOME | DRG: 637 | End: 2019-07-19
Attending: HOSPITALIST | Admitting: HOSPITALIST
Payer: MEDICARE

## 2019-07-17 VITALS
HEART RATE: 60 BPM | SYSTOLIC BLOOD PRESSURE: 164 MMHG | DIASTOLIC BLOOD PRESSURE: 97 MMHG | OXYGEN SATURATION: 97 % | TEMPERATURE: 98 F | RESPIRATION RATE: 28 BRPM

## 2019-07-17 DIAGNOSIS — I10 ESSENTIAL (PRIMARY) HYPERTENSION: ICD-10-CM

## 2019-07-17 DIAGNOSIS — Z90.49 ACQUIRED ABSENCE OF OTHER SPECIFIED PARTS OF DIGESTIVE TRACT: Chronic | ICD-10-CM

## 2019-07-17 DIAGNOSIS — E03.9 HYPOTHYROIDISM, UNSPECIFIED: ICD-10-CM

## 2019-07-17 DIAGNOSIS — E11.9 TYPE 2 DIABETES MELLITUS WITHOUT COMPLICATIONS: ICD-10-CM

## 2019-07-17 DIAGNOSIS — E22.0 ACROMEGALY AND PITUITARY GIGANTISM: ICD-10-CM

## 2019-07-17 DIAGNOSIS — Z29.9 ENCOUNTER FOR PROPHYLACTIC MEASURES, UNSPECIFIED: ICD-10-CM

## 2019-07-17 DIAGNOSIS — Z78.9 OTHER SPECIFIED HEALTH STATUS: Chronic | ICD-10-CM

## 2019-07-17 DIAGNOSIS — Z79.899 OTHER LONG TERM (CURRENT) DRUG THERAPY: ICD-10-CM

## 2019-07-17 DIAGNOSIS — E16.2 HYPOGLYCEMIA, UNSPECIFIED: ICD-10-CM

## 2019-07-17 DIAGNOSIS — C18.9 MALIGNANT NEOPLASM OF COLON, UNSPECIFIED: ICD-10-CM

## 2019-07-17 LAB
ALBUMIN SERPL ELPH-MCNC: 3.6 G/DL — SIGNIFICANT CHANGE UP (ref 3.3–5)
ALP SERPL-CCNC: 131 U/L — HIGH (ref 40–120)
ALT FLD-CCNC: 16 U/L — SIGNIFICANT CHANGE UP (ref 10–45)
ANION GAP SERPL CALC-SCNC: 9 MMOL/L — SIGNIFICANT CHANGE UP (ref 5–17)
APPEARANCE UR: ABNORMAL
APPEARANCE UR: CLEAR — SIGNIFICANT CHANGE UP
APTT BLD: 30.7 SEC — SIGNIFICANT CHANGE UP (ref 27.5–36.3)
AST SERPL-CCNC: 28 U/L — SIGNIFICANT CHANGE UP (ref 10–40)
BACTERIA # UR AUTO: NEGATIVE — SIGNIFICANT CHANGE UP
BACTERIA # UR AUTO: NEGATIVE — SIGNIFICANT CHANGE UP
BASOPHILS # BLD AUTO: 0 K/UL — SIGNIFICANT CHANGE UP (ref 0–0.2)
BILIRUB SERPL-MCNC: 0.6 MG/DL — SIGNIFICANT CHANGE UP (ref 0.2–1.2)
BILIRUB UR-MCNC: NEGATIVE — SIGNIFICANT CHANGE UP
BILIRUB UR-MCNC: NEGATIVE — SIGNIFICANT CHANGE UP
BUN SERPL-MCNC: 17 MG/DL — SIGNIFICANT CHANGE UP (ref 7–23)
CALCIUM SERPL-MCNC: 9.1 MG/DL — SIGNIFICANT CHANGE UP (ref 8.4–10.5)
CHLORIDE SERPL-SCNC: 98 MMOL/L — SIGNIFICANT CHANGE UP (ref 96–108)
CO2 SERPL-SCNC: 28 MMOL/L — SIGNIFICANT CHANGE UP (ref 22–31)
COLOR SPEC: ABNORMAL
COLOR SPEC: SIGNIFICANT CHANGE UP
CREAT SERPL-MCNC: 1.03 MG/DL — SIGNIFICANT CHANGE UP (ref 0.5–1.3)
DIFF PNL FLD: ABNORMAL
DIFF PNL FLD: NEGATIVE — SIGNIFICANT CHANGE UP
EOSINOPHIL # BLD AUTO: 0.2 K/UL — SIGNIFICANT CHANGE UP (ref 0–0.5)
EOSINOPHIL NFR BLD AUTO: 2 % — SIGNIFICANT CHANGE UP (ref 0–6)
EPI CELLS # UR: 0 /HPF — SIGNIFICANT CHANGE UP
EPI CELLS # UR: 1 — SIGNIFICANT CHANGE UP
GAS PNL BLDV: SIGNIFICANT CHANGE UP
GLUCOSE BLDC GLUCOMTR-MCNC: 228 MG/DL — HIGH (ref 70–99)
GLUCOSE BLDC GLUCOMTR-MCNC: 245 MG/DL — HIGH (ref 70–99)
GLUCOSE BLDC GLUCOMTR-MCNC: 294 MG/DL — HIGH (ref 70–99)
GLUCOSE SERPL-MCNC: 87 MG/DL — SIGNIFICANT CHANGE UP (ref 70–99)
GLUCOSE UR QL: ABNORMAL
GLUCOSE UR QL: NEGATIVE — SIGNIFICANT CHANGE UP
HCT VFR BLD CALC: 41.8 % — SIGNIFICANT CHANGE UP (ref 39–50)
HGB BLD-MCNC: 15.2 G/DL — SIGNIFICANT CHANGE UP (ref 13–17)
HYALINE CASTS # UR AUTO: 0 /LPF — SIGNIFICANT CHANGE UP (ref 0–2)
HYALINE CASTS # UR AUTO: 0 /LPF — SIGNIFICANT CHANGE UP (ref 0–2)
INR BLD: 1.42 RATIO — HIGH (ref 0.88–1.16)
KETONES UR-MCNC: ABNORMAL
KETONES UR-MCNC: NEGATIVE — SIGNIFICANT CHANGE UP
LEUKOCYTE ESTERASE UR-ACNC: NEGATIVE — SIGNIFICANT CHANGE UP
LEUKOCYTE ESTERASE UR-ACNC: NEGATIVE — SIGNIFICANT CHANGE UP
LYMPHOCYTES # BLD AUTO: 1.1 K/UL — SIGNIFICANT CHANGE UP (ref 1–3.3)
LYMPHOCYTES # BLD AUTO: 14 % — SIGNIFICANT CHANGE UP (ref 13–44)
MCHC RBC-ENTMCNC: 36.4 GM/DL — HIGH (ref 32–36)
MCHC RBC-ENTMCNC: 39.7 PG — HIGH (ref 27–34)
MCV RBC AUTO: 109 FL — HIGH (ref 80–100)
MONOCYTES # BLD AUTO: 0.8 K/UL — SIGNIFICANT CHANGE UP (ref 0–0.9)
MONOCYTES NFR BLD AUTO: 7 % — SIGNIFICANT CHANGE UP (ref 2–14)
NEUTROPHILS # BLD AUTO: 6 K/UL — SIGNIFICANT CHANGE UP (ref 1.8–7.4)
NEUTROPHILS NFR BLD AUTO: 76 % — SIGNIFICANT CHANGE UP (ref 43–77)
NITRITE UR-MCNC: NEGATIVE — SIGNIFICANT CHANGE UP
NITRITE UR-MCNC: NEGATIVE — SIGNIFICANT CHANGE UP
PH UR: 6 — SIGNIFICANT CHANGE UP (ref 5–8)
PH UR: 8 — SIGNIFICANT CHANGE UP (ref 5–8)
PLATELET # BLD AUTO: 196 K/UL — SIGNIFICANT CHANGE UP (ref 150–400)
POTASSIUM SERPL-MCNC: 3.8 MMOL/L — SIGNIFICANT CHANGE UP (ref 3.5–5.3)
POTASSIUM SERPL-SCNC: 3.8 MMOL/L — SIGNIFICANT CHANGE UP (ref 3.5–5.3)
PROT SERPL-MCNC: 7.2 G/DL — SIGNIFICANT CHANGE UP (ref 6–8.3)
PROT UR-MCNC: ABNORMAL
PROT UR-MCNC: ABNORMAL
PROTHROM AB SERPL-ACNC: 16.5 SEC — HIGH (ref 10–12.9)
RBC # BLD: 3.83 M/UL — LOW (ref 4.2–5.8)
RBC # FLD: 15.8 % — HIGH (ref 10.3–14.5)
RBC CASTS # UR COMP ASSIST: 1 /HPF — SIGNIFICANT CHANGE UP (ref 0–4)
RBC CASTS # UR COMP ASSIST: >50 /HPF — HIGH (ref 0–4)
SODIUM SERPL-SCNC: 135 MMOL/L — SIGNIFICANT CHANGE UP (ref 135–145)
SP GR SPEC: 1.01 — SIGNIFICANT CHANGE UP (ref 1.01–1.02)
SP GR SPEC: 1.02 — SIGNIFICANT CHANGE UP (ref 1.01–1.02)
T3FREE SERPL-MCNC: 1.57 PG/ML — LOW (ref 1.8–4.6)
TSH SERPL-MCNC: 6.21 UIU/ML — HIGH (ref 0.27–4.2)
UROBILINOGEN FLD QL: NEGATIVE — SIGNIFICANT CHANGE UP
UROBILINOGEN FLD QL: NEGATIVE — SIGNIFICANT CHANGE UP
WBC # BLD: 8.2 K/UL — SIGNIFICANT CHANGE UP (ref 3.8–10.5)
WBC # FLD AUTO: 8.2 K/UL — SIGNIFICANT CHANGE UP (ref 3.8–10.5)
WBC UR QL: 0 /HPF — SIGNIFICANT CHANGE UP (ref 0–5)
WBC UR QL: 12 /HPF — HIGH (ref 0–5)

## 2019-07-17 PROCEDURE — 12345: CPT | Mod: NC

## 2019-07-17 PROCEDURE — 99223 1ST HOSP IP/OBS HIGH 75: CPT | Mod: GC

## 2019-07-17 PROCEDURE — 70450 CT HEAD/BRAIN W/O DYE: CPT | Mod: 26

## 2019-07-17 PROCEDURE — 93010 ELECTROCARDIOGRAM REPORT: CPT

## 2019-07-17 PROCEDURE — 71045 X-RAY EXAM CHEST 1 VIEW: CPT | Mod: 26

## 2019-07-17 PROCEDURE — 99284 EMERGENCY DEPT VISIT MOD MDM: CPT | Mod: GC

## 2019-07-17 RX ORDER — DEXTROSE 50 % IN WATER 50 %
12.5 SYRINGE (ML) INTRAVENOUS ONCE
Refills: 0 | Status: DISCONTINUED | OUTPATIENT
Start: 2019-07-17 | End: 2019-07-19

## 2019-07-17 RX ORDER — MULTIVIT-MIN/FERROUS GLUCONATE 9 MG/15 ML
1 LIQUID (ML) ORAL DAILY
Refills: 0 | Status: DISCONTINUED | OUTPATIENT
Start: 2019-07-17 | End: 2019-07-19

## 2019-07-17 RX ORDER — GLUCAGON INJECTION, SOLUTION 0.5 MG/.1ML
1 INJECTION, SOLUTION SUBCUTANEOUS ONCE
Refills: 0 | Status: DISCONTINUED | OUTPATIENT
Start: 2019-07-17 | End: 2019-07-19

## 2019-07-17 RX ORDER — DEXTROSE 50 % IN WATER 50 %
25 SYRINGE (ML) INTRAVENOUS ONCE
Refills: 0 | Status: DISCONTINUED | OUTPATIENT
Start: 2019-07-17 | End: 2019-07-19

## 2019-07-17 RX ORDER — INSULIN LISPRO 100/ML
VIAL (ML) SUBCUTANEOUS
Refills: 0 | Status: DISCONTINUED | OUTPATIENT
Start: 2019-07-17 | End: 2019-07-19

## 2019-07-17 RX ORDER — PYRIDOXINE HCL (VITAMIN B6) 100 MG
50 TABLET ORAL DAILY
Refills: 0 | Status: DISCONTINUED | OUTPATIENT
Start: 2019-07-17 | End: 2019-07-19

## 2019-07-17 RX ORDER — CARVEDILOL PHOSPHATE 80 MG/1
25 CAPSULE, EXTENDED RELEASE ORAL EVERY 12 HOURS
Refills: 0 | Status: DISCONTINUED | OUTPATIENT
Start: 2019-07-17 | End: 2019-07-17

## 2019-07-17 RX ORDER — FUROSEMIDE 40 MG
20 TABLET ORAL
Refills: 0 | Status: DISCONTINUED | OUTPATIENT
Start: 2019-07-17 | End: 2019-07-18

## 2019-07-17 RX ORDER — CARVEDILOL PHOSPHATE 80 MG/1
25 CAPSULE, EXTENDED RELEASE ORAL EVERY 12 HOURS
Refills: 0 | Status: DISCONTINUED | OUTPATIENT
Start: 2019-07-17 | End: 2019-07-19

## 2019-07-17 RX ORDER — SODIUM CHLORIDE 9 MG/ML
1000 INJECTION, SOLUTION INTRAVENOUS
Refills: 0 | Status: DISCONTINUED | OUTPATIENT
Start: 2019-07-17 | End: 2019-07-17

## 2019-07-17 RX ORDER — AMLODIPINE BESYLATE 2.5 MG/1
2.5 TABLET ORAL DAILY
Refills: 0 | Status: DISCONTINUED | OUTPATIENT
Start: 2019-07-17 | End: 2019-07-18

## 2019-07-17 RX ORDER — LOSARTAN POTASSIUM 100 MG/1
100 TABLET, FILM COATED ORAL DAILY
Refills: 0 | Status: DISCONTINUED | OUTPATIENT
Start: 2019-07-17 | End: 2019-07-19

## 2019-07-17 RX ORDER — DEXTROSE 50 % IN WATER 50 %
15 SYRINGE (ML) INTRAVENOUS ONCE
Refills: 0 | Status: DISCONTINUED | OUTPATIENT
Start: 2019-07-17 | End: 2019-07-19

## 2019-07-17 RX ORDER — SODIUM CHLORIDE 9 MG/ML
500 INJECTION INTRAMUSCULAR; INTRAVENOUS; SUBCUTANEOUS ONCE
Refills: 0 | Status: COMPLETED | OUTPATIENT
Start: 2019-07-17 | End: 2019-07-17

## 2019-07-17 RX ORDER — INSULIN GLARGINE 100 [IU]/ML
10 INJECTION, SOLUTION SUBCUTANEOUS EVERY MORNING
Refills: 0 | Status: DISCONTINUED | OUTPATIENT
Start: 2019-07-18 | End: 2019-07-19

## 2019-07-17 RX ORDER — LEVOTHYROXINE SODIUM 125 MCG
137 TABLET ORAL DAILY
Refills: 0 | Status: DISCONTINUED | OUTPATIENT
Start: 2019-07-17 | End: 2019-07-19

## 2019-07-17 RX ORDER — INSULIN LISPRO 100/ML
2 VIAL (ML) SUBCUTANEOUS
Refills: 0 | Status: DISCONTINUED | OUTPATIENT
Start: 2019-07-18 | End: 2019-07-19

## 2019-07-17 RX ORDER — ENOXAPARIN SODIUM 100 MG/ML
40 INJECTION SUBCUTANEOUS DAILY
Refills: 0 | Status: DISCONTINUED | OUTPATIENT
Start: 2019-07-17 | End: 2019-07-19

## 2019-07-17 RX ORDER — SODIUM CHLORIDE 9 MG/ML
1000 INJECTION, SOLUTION INTRAVENOUS
Refills: 0 | Status: DISCONTINUED | OUTPATIENT
Start: 2019-07-17 | End: 2019-07-19

## 2019-07-17 RX ORDER — DEXTROSE 50 % IN WATER 50 %
50 SYRINGE (ML) INTRAVENOUS ONCE
Refills: 0 | Status: COMPLETED | OUTPATIENT
Start: 2019-07-17 | End: 2019-07-17

## 2019-07-17 RX ORDER — INSULIN LISPRO 100/ML
2 VIAL (ML) SUBCUTANEOUS
Refills: 0 | Status: DISCONTINUED | OUTPATIENT
Start: 2019-07-18 | End: 2019-07-18

## 2019-07-17 RX ORDER — INSULIN LISPRO 100/ML
VIAL (ML) SUBCUTANEOUS AT BEDTIME
Refills: 0 | Status: DISCONTINUED | OUTPATIENT
Start: 2019-07-17 | End: 2019-07-19

## 2019-07-17 RX ADMIN — CARVEDILOL PHOSPHATE 25 MILLIGRAM(S): 80 CAPSULE, EXTENDED RELEASE ORAL at 18:43

## 2019-07-17 RX ADMIN — SODIUM CHLORIDE 30 MILLILITER(S): 9 INJECTION, SOLUTION INTRAVENOUS at 02:32

## 2019-07-17 RX ADMIN — Medication 2: at 14:14

## 2019-07-17 RX ADMIN — AMLODIPINE BESYLATE 2.5 MILLIGRAM(S): 2.5 TABLET ORAL at 11:05

## 2019-07-17 RX ADMIN — LOSARTAN POTASSIUM 100 MILLIGRAM(S): 100 TABLET, FILM COATED ORAL at 18:40

## 2019-07-17 RX ADMIN — Medication 137 MICROGRAM(S): at 11:04

## 2019-07-17 RX ADMIN — ENOXAPARIN SODIUM 40 MILLIGRAM(S): 100 INJECTION SUBCUTANEOUS at 13:33

## 2019-07-17 RX ADMIN — Medication 20 MILLIGRAM(S): at 11:05

## 2019-07-17 RX ADMIN — Medication 1.5 MILLIGRAM(S): at 20:55

## 2019-07-17 RX ADMIN — SODIUM CHLORIDE 1000 MILLILITER(S): 9 INJECTION INTRAMUSCULAR; INTRAVENOUS; SUBCUTANEOUS at 02:16

## 2019-07-17 RX ADMIN — Medication 2: at 18:39

## 2019-07-17 RX ADMIN — Medication 50 MILLILITER(S): at 02:30

## 2019-07-17 NOTE — ED ADULT NURSE REASSESSMENT NOTE - NS ED NURSE REASSESS COMMENT FT1
Patient now A&Ox3; states "I feel good, when can I leave?". Patient states he gave himself 6 units of Novolog with dinner.     Patient . To be transported to CT.

## 2019-07-17 NOTE — H&P ADULT - NSHPPHYSICALEXAM_GEN_ALL_CORE
T(C): 36.5 (07-17-19 @ 05:11), Max: 36.9 (07-17-19 @ 01:43)  HR: 72 (07-17-19 @ 05:11) (60 - 72)  BP: 158/78 (07-17-19 @ 05:11) (151/73 - 164/97)  RR: 18 (07-17-19 @ 05:11) (17 - 28)  SpO2: 96% (07-17-19 @ 05:11) (95% - 97%)  General: NAD, comfortably resting in bed  Eyes: no conjunctival erythema  ENT: MMM  Neck: Neck supple, No JVD  Respiratory: CTA B/L, No wheezing, rales, rhonchi  CV: RRR no murmurs  Abdominal: +abdominal chemo port. +abdomina hernia, surgical scars, Soft, NT, +BS  MSK: no focal weakness  Extremities: No edema, 2+ peripheral pulses  Neurology: A&Ox3, nonfocal, GRAMAJO x 4  Skin: No Rashes, Hematoma, Ecchymosis  Psych: Calm and appropriate T(C): 36.5 (07-17-19 @ 05:11), Max: 36.9 (07-17-19 @ 01:43)  HR: 72 (07-17-19 @ 05:11) (60 - 72)  BP: 158/78 (07-17-19 @ 05:11) (151/73 - 164/97)  RR: 18 (07-17-19 @ 05:11) (17 - 28)  SpO2: 96% (07-17-19 @ 05:11) (95% - 97%)  General: NAD, comfortably resting in bed  Eyes: no conjunctival erythema  ENT: MMM  Neck: Neck supple, No JVD  Lung: CTA B/L, No wheezing, rales, rhonchi  Heart: RRR no murmurs  Abdomen: +abdominal chemo port. +abdomina hernia, surgical scars, Soft, NT, +BS  MSK: no focal weakness  Extremities: No edema, 2+ peripheral pulses  Neurology: A&Ox3, nonfocal, GRAMAJO x 4  Skin: No Rashes, Hematoma, Ecchymosis  Psych: Calm and appropriate

## 2019-07-17 NOTE — H&P ADULT - NSICDXPASTMEDICALHX_GEN_ALL_CORE_FT
PAST MEDICAL HISTORY:  Colon cancer     Hypertension     Hypothyroidism     Neuropathy     Type 2 diabetes mellitus

## 2019-07-17 NOTE — CONSULT NOTE ADULT - ASSESSMENT
77y/o M with PMHx of HTN, Colon CA w/ mets to liver and lung, T2DM (w/ mult episodes of hypoglycemia in the past), who p/w unresponsiveness 2/2 hypoglycemia. Patient is on metformin 1g qday, basiglar 15 units and humalog 2 units ac breakfast and 6-10 units ac dinner. Takes snacks (candy) to prevent overnight hypoglycemia and didn't get to eat one last night. Hypoglycemia likely 2/2 dinner time humalog dosage being too high.

## 2019-07-17 NOTE — H&P ADULT - PROBLEM SELECTOR PLAN 1
Pt with history of recurrent hypoglycemic episodes. Pt reports half a dozen episodes over last few years. Recent episode pt reports taking regular insulin dose with despite reduced PO intake. Pt's hypoglycemia since resolved and mental status back to baseline.  - endocrine consult for further management of T2DM Pt with history of recurrent hypoglycemic episodes. Pt reports half a dozen episodes over last few years. Recent episode pt reports taking regular insulin dose with despite reduced PO intake. Pt's hypoglycemia since resolved and mental status back to baseline.  - endocrine consult for further management of T2DM  - Hold all insulin as pt FS down trending despite D5 gtt  - Encourage PO intake Pt with history of recurrent hypoglycemic episodes. Pt reports half a dozen episodes over last few years. Recent episode pt reports taking regular insulin dose with despite reduced PO intake. Pt's hypoglycemia since resolved and mental status back to baseline.  - endocrine consult for further management of T2DM  - Hold all insulin as pt FS down trending despite D5 gtt  - Encourage PO intake  -check TSH, cortisol Pt with history of recurrent hypoglycemic episodes. Pt reports half a dozen episodes over last few years. Recent episode pt reports taking regular insulin dose with despite reduced PO intake. Pt's hypoglycemia since resolved and mental status back to baseline.  - endocrine consult for further management of T2DM  - Hold all insulin as pt FS down trending despite D5 gtt  - Encourage PO intake  -check TSH, cortisol  -hold bblocker

## 2019-07-17 NOTE — CONSULT NOTE ADULT - PROBLEM SELECTOR RECOMMENDATION 9
Hypoglycemia likely 2/2 dinner time humalog dosage being too high. Hgb a1c of 7.0 in a patient with colon ca w/ mets may be too tight of a control.   -hgb a1c should be <8.0   -decreasing basaglar to 10 units and humalog to 2 units ac dinner  -may take 2 additional units if having a snack/candy at bedtime    Discharge  -f/u with outpatient endocrinologist   -discharge with glucagon kit and teach patient+wife at bedside how to use it

## 2019-07-17 NOTE — H&P ADULT - NSHPREVIEWOFSYSTEMS_GEN_ALL_CORE
REVIEW OF SYSTEMS: Pt reports he feels cold, but denies any and all symptoms.     CONSTITUTIONAL: No weakness, fevers or chills  EYES: no blurry vision or eye pain.   ENT: No throat pain. No dysphagia.    NECK: No pain or stiffness  RESPIRATORY: No cough, wheezing, hemoptysis; No shortness of breath  CARDIOVASCULAR: No chest pain or palpitations.  GASTROINTESTINAL: No abdominal pain. No nausea or vomiting; No diarrhea or constipation. No melena or hematochezia.  GENITOURINARY: No dysuria, frequency or hematuria  NEUROLOGICAL: No numbness or weakness. No dizziness or falls.   SKIN: No itching, burning, rashes, or lesions.   LYMPHATIC: No masses or swelling.   All other review of systems is negative unless indicated above.

## 2019-07-17 NOTE — H&P ADULT - ATTENDING COMMENTS
Pt seen and examined at bedside.  I have precepted this case with house staff and agree with resident note above and have edited it where appropriate.  Pt seen and examined for interval assessment after resident presentation; seen at 715 am. Pt is mentating, following commands, and is quite lucid. He says this type of presentation with hypoglycemia is quite common for him, o/p Insulin doses have been lowered in the past, last a1c was many months ago and reportedly about 7. I asked oncoming ER RN to get stat fingerstick as last fsg was around 4 am and was downtrending mildly despite being on d5 gtt at 30 cc/hr. The ER glucometers are currently being assessed for QC and calibrated, she will perform fsg as soon as possible.  Plan to hold all insulin for now, increase D5 if fsg downtrending, low threshold for micu eval if increasing requirements for d5.  check TFT/cortisol.     Care to be assumed by day hospitalist at 8 am.  This patient was assigned to me by the hospitalist in charge; my involvement in this case has consisted of the initial history, physical, chart review, and management plan.  This patient was previously unknown to me.

## 2019-07-17 NOTE — ED ADULT NURSE NOTE - OBJECTIVE STATEMENT
78 y.o M presents to the ED from home via EMS for "unresponsiveness." As per EMS the patient was found to be nonresponsive- he was given 1 amp pf Dextrose and 2 mg Narcan and he started to wake up." As per patients spouse "when he was sleeping he started breathing funny but then he wasn't waking up or responding, I thought his blood sugar was low so I tried to give him juice but he wouldn't swallow it so I called 911." Additionally, spouse states the patient has a hx of colon cancer and is due for chemo tomorrow (every other week); patient has diabetes and gave himself insulin before bed per sliding scale. Amount of insulin unknown. Patient presents A&ox0 and afebrile; blood glucose 94 on arrival. Cardiac monitoring initiated- sinus rhythm on the monitor.

## 2019-07-17 NOTE — ED PROVIDER NOTE - ATTENDING CONTRIBUTION TO CARE
****ATTENDING**** 77yo male hx HTN, metastatic colon ca on chemo, DM BIB EMS s/p wife noticed pt was  unresponsive. Pt was noted to have BS of 20 given I 1amp D50 and 2mg Narcan. States patient woke up at that time. As per family pt was in good health until symptoms today. No recent infection or symptoms. Pt has had episodes of hypoglycemia prior.   On exam patient is following commands. PERRL 4mm b/l, Chest + diffuse rhonchi. +S1S2, Abd soft nd/nt +BS. Moving all 4 extremities.   Check labs, cultures, repeat FS and monitor for hypoglycemia and infection.

## 2019-07-17 NOTE — CONSULT NOTE ADULT - PROBLEM SELECTOR RECOMMENDATION 3
TSH 6.2 with T3 1.57 on synthroid 137 mcg. Elderly population may have TSH up to 7.5 and patient has been taking it incorrectly.   -c/w synthroid at 137 mcg  -outpatient endo f/u

## 2019-07-17 NOTE — ED PROVIDER NOTE - PROGRESS NOTE DETAILS
Pt FS dropped again from 94 (presentation) to 43. Will start on D5 NS maintenance -renu, pgy3 Pt FS dropped again from 94 (presentation) to 53. Will start on D5 NS maintenance -renu, pgy3

## 2019-07-17 NOTE — H&P ADULT - PROBLEM SELECTOR PLAN 4
Pt reports Hx of HTN and CAD. Denies known Hx of CHF. No ECHO in system.   - c/w carvediolol 25mg BID  - amlodipine 2.5mg  - Lasix 20mg BID  - losartan 100mg  - Pt reports taking metoprolol as well, but no pharmacy records Pt reports Hx of HTN and CAD. Denies known Hx of CHF. No ECHO in system.   - hold carvediolol 25mg BID given beta blockers blunt response to hypoglycemia   - amlodipine 2.5mg  - Lasix 20mg BID  - losartan 100mg  - Pt reports taking metoprolol as well, but no pharmacy records Pt reports Hx of HTN and CAD. Denies known Hx of CHF. No ECHO in system.   - hold carvediolol 25mg BID given beta blockers blunt response to hypoglycemia and can suppress adrenergic surge, potentiating hypoglycemia  - amlodipine 2.5mg  - Lasix 20mg BID  - losartan 100mg  - Pt reports taking metoprolol as well, but no pharmacy records

## 2019-07-17 NOTE — ED PROVIDER NOTE - CLINICAL SUMMARY MEDICAL DECISION MAKING FREE TEXT BOX
Pt p/w unresponsiveness, hypoglycemia to 20 by EMS upon arrival, now FS improving and pt slightly more alert. Will assess for signs of infx, but likely related to suboptimal insulin regimen, pt likely needs adjustment of long acting insulin. Pt not on sulfonylureas. Cont to reassess, once FS showing stable improvement will admit vs CDU for endo consult -Jaswinder

## 2019-07-17 NOTE — CONSULT NOTE ADULT - SUBJECTIVE AND OBJECTIVE BOX
HPI:  Colleen is a 77y/o M with PMHx of HTN, Colon CA w/ mets to liver and lung, T2DM (w/ mult episodes of hypoglycemia in the past), who p/w unresponsiveness.   As per pt's wife, pt was in his usual state of health Tuesday. He was asleep and wife noticed his breathing "did not sound right." He did not respond to his name or awaken when being shaken. He was nonresponsive unable to eat candy/juice. Wife called 911 and EMS checked FS and found it to 20. Pt was due for chemo today at [facility] [place] (on Capecitabine PO and Avastin IV). EMS gave 1 amp D50 and 2 of Narcan (pt did not initially respond to D50). Pt's breathing and vitals were stable during EMS evaluation. In the ED pt was noted to become more alert with lingering disorientation.  Pt reports he ate less than usual for dinner, skipping dessert. He took his usual premeal (Novolog 6u before dinner). He last remembers going to bed, then awakening in the ED. He denies recent travel, recent infection, or recent medication changes.     In the ED pt received 500ml NS bolus, 1amp D50 and started on low rate D5 (17 Jul 2019 05:59)    Endo:   T2DM for 16 years, follows Dr. Hu outpatient for diabetes with hgb a1c of 7 and no complications of diabetes. He has neuropathy from chemo. Currently on metformin 1g qday, basiglar 15 units and humalog 2 units ac breakfast and 6-10 units ac dinner. Similar episode of hypoglycemia in the past, last hospitalization in system was last year where the insulin was reduced. Patient said he has lost ~15 lbs in the last year due to malignancy. He eats candy at night to try to prevent himself from having hypoglycemia overnight, about 1 time/month and he sometimes wakes up with FS of <70 in the AM as well. He didn't get to eat any candy after his dinner of chicken masala. Denies use of any other diabetes meds including sulfonylurea.     Hypothyroidism- takes levothyroxine 137 mcg everyday but with other medications.       PAST MEDICAL & SURGICAL HISTORY:  Neuropathy  Hypothyroidism  Type 2 diabetes mellitus  Hypertension  Colon cancer  History of bowel resection  History of cholecystectomy      FAMILY HISTORY:  FH: CVA (cerebrovascular accident)  FH: CHF (congestive heart failure)  FH: HTN (hypertension)      Social History: Denies tobacco or alcohol use.     Outpatient Medications: Home Medications:  amLODIPine 2.5 mg oral tablet: 1 tab(s) orally once a day (17 Jul 2019 15:05)  aspirin 81 mg oral delayed release tablet: 1 tab(s) orally every 3rd day (17 Jul 2019 15:05)  Basaglar KwikPen 100 units/mL subcutaneous solution: 15 unit(s) subcutaneous once a day (17 Jul 2019 15:05)  carvedilol 25 mg oral tablet: 1 tab(s) orally 2 times a day (17 Jul 2019 15:05)  Daily Multi oral tablet: 1 tab(s) orally once a day (17 Jul 2019 15:05)  furosemide 20 mg oral tablet: 1 tab(s) orally 2 times a day (17 Jul 2019 15:05)  LORazepam 0.5 mg oral tablet: 3 tab(s) orally once a day (at bedtime) (17 Jul 2019 15:05)  losartan 100 mg oral tablet: 1 tab(s) orally once a day (17 Jul 2019 15:05)  magnesium 500: 1 tab(s) orally once a day (17 Jul 2019 15:05)  metFORMIN 1000 mg oral tablet: 1 tab(s) orally once a day (17 Jul 2019 15:05)  NovoLOG 100 units/mL subcutaneous solution: 6 unit(s) subcutaneous 2 times a day via sliding scale (17 Jul 2019 15:05)  PARoxetine 20 mg oral tablet: 1 tab(s) orally once a day (17 Jul 2019 15:05)  potassium chloride 20 mEq oral tablet, extended release: take 2 tabs in the morning and 1 tab at night (17 Jul 2019 15:05)  SandoSTATIN LAR [place] 20 mg intramuscular injection, extended release: 1 kit intramuscularly once a month (17 Jul 2019 15:05)  Synthroid 137 mcg (0.137 mg) oral tablet: 1 tab(s) orally once a day (17 Jul 2019 15:05)  Vitamin B6: 1 tab(s) orally once a day (17 Jul 2019 15:05)      MEDICATIONS  (STANDING):  amLODIPine   Tablet 2.5 milliGRAM(s) Oral daily  carvedilol 25 milliGRAM(s) Oral every 12 hours  dextrose 5%. 1000 milliLiter(s) (50 mL/Hr) IV Continuous <Continuous>  dextrose 50% Injectable 12.5 Gram(s) IV Push once  dextrose 50% Injectable 25 Gram(s) IV Push once  dextrose 50% Injectable 25 Gram(s) IV Push once  enoxaparin Injectable 40 milliGRAM(s) SubCutaneous daily  furosemide    Tablet 20 milliGRAM(s) Oral two times a day  insulin lispro (HumaLOG) corrective regimen sliding scale   SubCutaneous three times a day before meals  levothyroxine 137 MICROGram(s) Oral daily  losartan 100 milliGRAM(s) Oral daily  multivitamin/minerals 1 Tablet(s) Oral daily  PARoxetine 20 milliGRAM(s) Oral daily  pyridoxine 50 milliGRAM(s) Oral daily    MEDICATIONS  (PRN):  dextrose 40% Gel 15 Gram(s) Oral once PRN Blood Glucose LESS THAN 70 milliGRAM(s)/deciliter  glucagon  Injectable 1 milliGRAM(s) IntraMuscular once PRN Glucose LESS THAN 70 milligrams/deciliter  LORazepam     Tablet 1.5 milliGRAM(s) Oral at bedtime PRN Anxiety      Allergies    No Known Allergies    Intolerances      Review of Systems:  Constitutional: No fever, chills   Neuro: No tremors, no visual changes   Cardiovascular: No chest pain, palpitations  Respiratory: No SOB, no cough  GI: No nausea, vomiting, abdominal pain  : No dysuria, polyuria   Skin: no rash, ulcers   Psych: no depression, anxiety   Endocrine: no polyphagia, polydipsia     ALL OTHER SYSTEMS REVIEWED AND NEGATIVE        PHYSICAL EXAM:  VITALS: T(C): 36.6 (07-17-19 @ 15:34)  T(F): 97.9 (07-17-19 @ 15:34), Max: 98.5 (07-17-19 @ 01:43)  HR: 69 (07-17-19 @ 15:34) (60 - 78)  BP: 126/56 (07-17-19 @ 15:34) (126/56 - 164/97)  RR:  (17 - 28)  SpO2:  (95% - 100%)  Wt(kg): --  GENERAL: NAD, well-groomed, well-developed  EYES: No proptosis, anicteric  HEENT:  Atraumatic, Normocephalic, moist mucous membranes  RESPIRATORY: Clear to auscultation bilaterally; No rales, rhonchi, wheezing  CARDIOVASCULAR: Regular rate and rhythm; No murmurs; no peripheral edema  GI: Soft, nontender, non distended, normal bowel sounds  SKIN: Dry, intact, No rashes or lesions  NEURO: AOx3, moves all extremities spontaneously   PSYCH: Reactive affect, euthymic mood    POCT Blood Glucose.: 245 mg/dL (07-17-19 @ 13:20)  POCT Blood Glucose.: 170 mg/dL (07-17-19 @ 07:21)  POCT Blood Glucose.: 114 mg/dL (07-17-19 @ 04:15)  POCT Blood Glucose.: 136 mg/dL (07-17-19 @ 03:37)  POCT Blood Glucose.: 138 mg/dL (07-17-19 @ 02:53)  POCT Blood Glucose.: 53 mg/dL (07-17-19 @ 02:21)  POCT Blood Glucose.: 94 mg/dL (07-17-19 @ 01:24)                            15.2   8.2   )-----------( 196      ( 17 Jul 2019 02:20 )             41.8       07-17    135  |  98  |  17  ----------------------------<  87  3.8   |  28  |  1.03    EGFR if : 80  EGFR if non : 69    Ca    9.1      07-17    TPro  7.2  /  Alb  3.6  /  TBili  0.6  /  DBili  x   /  AST  28  /  ALT  16  /  AlkPhos  131<H>  07-17      Thyroid Function Tests:  07-17 @ 14:02 TSH 6.21 FreeT4 -- T3 -- Anti TPO -- Anti Thyroglobulin Ab -- TSI --                Radiology:

## 2019-07-17 NOTE — CHART NOTE - NSCHARTNOTEFT_GEN_A_CORE
Called to eval hematuria. No urine present and this ams u/a was clear of all blood. No urine sample saved for assessment.  Please recall if hematuria reoccurs, save sample, and send ua.

## 2019-07-17 NOTE — CONSULT NOTE ADULT - PROBLEM SELECTOR RECOMMENDATION 2
Hgb a1c of 7.0 in a patient with colon ca w/ mets may be too tight of a control.   -hgb a1c should be <8.0   -decreasing basaglar to 10 units and humalog to 2 units ac dinner  - FS TIDACHS  - low dose ISS TIDACHS      Discharge  -f/u with outpatient endocrinologist   -discharge with glucagon kit and teach patient+wife at bedside how to use it  -basiglar 10 units and humalog 2 units ACBK and ACDinner

## 2019-07-17 NOTE — H&P ADULT - NSICDXFAMILYHX_GEN_ALL_CORE_FT
FAMILY HISTORY:  FH: CHF (congestive heart failure)  FH: CVA (cerebrovascular accident)  FH: HTN (hypertension)

## 2019-07-17 NOTE — ED PROVIDER NOTE - OBJECTIVE STATEMENT
78y m PMhx HTN, met. colon ca, IDDM (w/ mult episodes of hypoglycemia in the past), p/w unresponsiveness. Pt was in bed w/ wife this evening, wife woke up after hearing pt "breathing strangely"; she then attempted to wake him up but was unable to do so. EMS was called, upon arrival FS was 20, gave 1 amp D50 and 2 of narcan (pt did not initially respond to D50). Pt breathing spontaneously and vitals stable during all of this. Pt becoming more alert upon arrival to hospital; still disoriented but will follow commands.

## 2019-07-17 NOTE — CONSULT NOTE ADULT - ATTENDING COMMENTS
Agree with assessment and plan as above by Dr. Chino. Reviewed all pertinent labs, glucose values, and imaging studies. Modifications made as indicated above.     Toan Jones D.O  [phone]

## 2019-07-17 NOTE — H&P ADULT - PROBLEM SELECTOR PLAN 6
Pt reports taking metoprolol and Mg/K supplements, however no pharmacy records and pt does not know doses of supplements  - contact pt's [facility] pharmacy in the AM

## 2019-07-17 NOTE — H&P ADULT - PROBLEM SELECTOR PLAN 2
Pt reports Hx of T2DM since started chemo. Follows with Dr. Hu. No recent A1c, but pt reports FS typically low 100s, up to low 200s at times. Pt reports Hx of T2DM since started chemo. Follows with Dr. Hu. No recent A1c, but pt reports FS typically low 100s, up to low 200s at times. Taking 15u basal QAM, and 6u premeal BID. metformin 1g daily  - insulin sliding scale   - A1c Pt reports Hx of T2DM since started chemo. Follows with Dr. Hu. No recent A1c, but pt reports FS typically low 100s, up to low 200s at times. Taking 15u basal QAM, and 6u premeal BID. metformin 1g daily  - A1c (goal 7.5-8.0)

## 2019-07-17 NOTE — H&P ADULT - PROBLEM SELECTOR PLAN 5
Pt has metastatic colon cancer, s/p multiple bowl resections, and liver/lung resections. [place] [facility] [place] has metastatic colon cancer, s/p multiple bowl resections, and liver/lung resections. [place] [facility] [place]  - contact o/p provider

## 2019-07-17 NOTE — H&P ADULT - HISTORY OF PRESENT ILLNESS
Colleen is a 79y/o M with PMHx of HTN, Colon CA w/ mets to liver and lung, T2DM (w/ mult episodes of hypoglycemia in the past), who p/w unresponsiveness.   As per pt's wife pt was in his usual state of health Tuesday. He was asleep and wife noticed his breathing "did not sound right." He did not respond to his name or awaken when being shaken. He was nonresponsive unable to eat candy/juice. Wife called 911 and EMS checked FS and found it to 20. Pt was due for chemo today at [facility] [place] (on Capecitabine PO and Avastin IV). Colleen is a 77y/o M with PMHx of HTN, Colon CA w/ mets to liver and lung, T2DM (w/ mult episodes of hypoglycemia in the past), who p/w unresponsiveness.   As per pt's wife, pt was in his usual state of health Tuesday. He was asleep and wife noticed his breathing "did not sound right." He did not respond to his name or awaken when being shaken. He was nonresponsive unable to eat candy/juice. Wife called 911 and EMS checked FS and found it to 20. Pt was due for chemo today at [facility] [place] (on Capecitabine PO and Avastin IV). EMS gave 1 amp D50 and 2 of Narcan (pt did not initially respond to D50). Pt's breathing and vitals were stable during EMS evaluation. In the ED pt was noted to become more alert with lingering disorientation.  Pt reports he ate less than usual for dinner, skipping dessert. He took his usual premeal (Novolog 6u before dinner). He last remembers going to bed, then awakening in the ED. He denies recent travel, recent infection, or recent medication changes. Colleen is a 79y/o M with PMHx of HTN, Colon CA w/ mets to liver and lung, T2DM (w/ mult episodes of hypoglycemia in the past), who p/w unresponsiveness.   As per pt's wife, pt was in his usual state of health Tuesday. He was asleep and wife noticed his breathing "did not sound right." He did not respond to his name or awaken when being shaken. He was nonresponsive unable to eat candy/juice. Wife called 911 and EMS checked FS and found it to 20. [place] [facility] [place]ast[place] EMS gave 1 amp D50 and 2 of Narcan (pt did not initially respond to D50). Pt's breathing and vitals were stable during EMS evaluation. In the ED pt was noted to become more alert with lingering disorientation.  Pt reports he ate less than usual for dinner, skipping dessert. He took his usual premeal (Novolog 6u before dinner). He last remembers going to bed, then awakening in the ED. He denies recent travel, recent infection, or recent medication changes.     In the ED pt received 500ml NS bolus, 1amp D50 and started on low rate D5

## 2019-07-17 NOTE — CHART NOTE - NSCHARTNOTEFT_GEN_A_CORE
CC: hypoglycemia    Patient seen and examined by me.  Discussed with BREANA Francois.  In brief, he is a 77y/o M with PMHx of HTN, Colon CA w/ mets to liver and lung, T2DM (w/ mult episodes of hypoglycemia in the past), who p/w metabolic encephalopathy due to hypoglycemia.  Overall with his chemo he has reduced po intake but did eat yesterday per wife at bedside.  Holding insulin.  Holding D5 gtt as now hyperglycemic and lucid.  Endocrine c/s placed for further management of DM2 with Hba1c pending and suspect brittle diabetes with h/o multiple hypoglycemic events in past.  F/u cortisol.  F/u with endocrine regarding elevated TSH on synthroid as well.      For his HTN and CAD history, resume coreg as he is no longer hypoglycemic, continue norvasc, lasix, losartan.    For his colon cancer, s/p multiple bowl resections, and liver/lung resections. [place] need to f[place] [facility] [place].    Medrec completed by pharmacy staff.      Beeper: [phone] CC: hypoglycemia    Patient seen and examined by me.  Discussed with BREANA Francois.  In brief, he is a 79y/o M with PMHx of HTN, Colon CA w/ mets to liver and lung, T2DM (w/ mult episodes of hypoglycemia in the past), who p/w metabolic encephalopathy due to hypoglycemia.  Overall with his chemo he has reduced po intake but did eat yesterday per wife at bedside.  Holding insulin.  Holding D5 gtt as now hyperglycemic and lucid.  Endocrine c/s placed for further management of DM2 with Hba1c pending and suspect brittle diabetes with h/o multiple hypoglycemic events in past.  F/u cortisol.  F/u with endocrine regarding elevated TSH on synthroid as well.      For his HTN and CAD history, resume coreg as he is no longer hypoglycemic, continue norvasc, lasix, losartan.    For his colon cancer, s/p multiple bowl resections, and liver/lung resections. [place] [facility] [place]    Medrec completed by pharmacy staff.  Istop reference #: [id number], confirmed home benzo use.      Beeper: [phone]

## 2019-07-17 NOTE — H&P ADULT - ASSESSMENT
Pt is a 79y/o M with PMHx of HTN, Colon CA w/ mets to liver and lung, T2DM (w/ mult episodes of hypoglycemia in the past), who p/w unresponsiveness 2/2 hypoglycemia

## 2019-07-17 NOTE — ED ADULT NURSE REASSESSMENT NOTE - NS ED NURSE REASSESS COMMENT FT1
Patient now A&Ox1 to self only; patient does not know where he is but recognizes spouse. Patient states he is feeling good-denies headache, dizziness, chest pain, n/v/d, denies hematuria and dysuria. Patient is currently following commands- no facial droop and extremities equal bilaterally upper and lower.    Straight catheterization procedure completed; patient tolerated procedure well. Sterility maintained throughout procedure. 700 ml of urine in the drainage bag. Samples collected and sent to lab.

## 2019-07-17 NOTE — H&P ADULT - NSHPLABSRESULTS_GEN_ALL_CORE
15.2   8.2   )-----------( 196      ( 2019 02:20 )             41.8                  135  |  98  |  17  ----------------------------<  87  3.8   |  28  |  1.03    Ca    9.1      2019 02:20    TPro  7.2  /  Alb  3.6  /  TBili  0.6  /  DBili  x   /  AST  28  /  ALT  16  /  AlkPhos  131<H>      PT/INR - ( 2019 02:20 )   PT: 16.5 sec;   INR: 1.42 ratio         PTT - ( 2019 02:20 )  PTT:30.7 sec    Urinalysis Basic - ( 2019 02:20 )    Color: Light Yellow / Appearance: Clear / S.010 / pH: x  Gluc: x / Ketone: Negative  / Bili: Negative / Urobili: Negative   Blood: x / Protein: 30 mg/dL / Nitrite: Negative   Leuk Esterase: Negative / RBC: 1 /hpf / WBC 0 /HPF   Sq Epi: x / Non Sq Epi: 0 /hpf / Bacteria: Negative    < from: CT Head No Cont (19 @ 03:18) >    IMPRESSION:   No acute intracranial hemorrhage, territorial infarct or mass effect.     < from: Xray Chest 1 View- PORTABLE-Urgent (19 @ 02:18) >    ******PRELIMINARY REPORT******              INTERPRETATION:  CLINICAL INFORMATION: Admission x-ray.    EXAM: AP view of chest.    COMPARISON: Chest radiograph from 2018    FINDINGS:    Surgical clips overlying left hemithorax.  Increased right hilar opacity.  No pleural effusion or pneumothorax.  Cardiac size cannot be accurately assessed on this AP projection.     IMPRESSION:   Mild increase of right hilar opacity may be projectional or may represent   lymphadenopathy. 15.2   8.2   )-----------( 196      ( 2019 02:20 )             41.8                  135  |  98  |  17  ----------------------------<  87  3.8   |  28  |  1.03    Ca    9.1      2019 02:20    TPro  7.2  /  Alb  3.6  /  TBili  0.6  /  DBili  x   /  AST  28  /  ALT  16  /  AlkPhos  131<H>      PT/INR - ( 2019 02:20 )   PT: 16.5 sec;   INR: 1.42 ratio         PTT - ( 2019 02:20 )  PTT:30.7 sec    Urinalysis Basic - ( 2019 02:20 )    Color: Light Yellow / Appearance: Clear / S.010 / pH: x  Gluc: x / Ketone: Negative  / Bili: Negative / Urobili: Negative   Blood: x / Protein: 30 mg/dL / Nitrite: Negative   Leuk Esterase: Negative / RBC: 1 /hpf / WBC 0 /HPF   Sq Epi: x / Non Sq Epi: 0 /hpf / Bacteria: Negative    < from: CT Head No Cont (19 @ 03:18) >    IMPRESSION:   No acute intracranial hemorrhage, territorial infarct or mass effect.     < from: Xray Chest 1 View- PORTABLE-Urgent (19 @ 02:18) >    ******PRELIMINARY REPORT******              INTERPRETATION:  CLINICAL INFORMATION: Admission x-ray.    EXAM: AP view of chest.    COMPARISON: Chest radiograph from 2018    FINDINGS:    Surgical clips overlying left hemithorax.  Increased right hilar opacity.  No pleural effusion or pneumothorax.  Cardiac size cannot be accurately assessed on this AP projection.     IMPRESSION:   Mild increase of right hilar opacity may be projectional or may represent   lymphadenopathy.    Labs, imaging, EKG  personally reviewed

## 2019-07-18 ENCOUNTER — TRANSCRIPTION ENCOUNTER (OUTPATIENT)
Age: 79
End: 2019-07-18

## 2019-07-18 DIAGNOSIS — R31.9 HEMATURIA, UNSPECIFIED: ICD-10-CM

## 2019-07-18 LAB
ANION GAP SERPL CALC-SCNC: 10 MMOL/L — SIGNIFICANT CHANGE UP (ref 5–17)
APPEARANCE UR: CLEAR — SIGNIFICANT CHANGE UP
BACTERIA # UR AUTO: NEGATIVE — SIGNIFICANT CHANGE UP
BILIRUB UR-MCNC: NEGATIVE — SIGNIFICANT CHANGE UP
BUN SERPL-MCNC: 22 MG/DL — SIGNIFICANT CHANGE UP (ref 7–23)
CALCIUM SERPL-MCNC: 8 MG/DL — LOW (ref 8.4–10.5)
CHLORIDE SERPL-SCNC: 97 MMOL/L — SIGNIFICANT CHANGE UP (ref 96–108)
CO2 SERPL-SCNC: 26 MMOL/L — SIGNIFICANT CHANGE UP (ref 22–31)
COLOR SPEC: YELLOW — SIGNIFICANT CHANGE UP
CREAT SERPL-MCNC: 1.05 MG/DL — SIGNIFICANT CHANGE UP (ref 0.5–1.3)
CULTURE RESULTS: NO GROWTH — SIGNIFICANT CHANGE UP
DIFF PNL FLD: ABNORMAL
EPI CELLS # UR: 0 /HPF — SIGNIFICANT CHANGE UP
GLUCOSE BLDC GLUCOMTR-MCNC: 314 MG/DL — HIGH (ref 70–99)
GLUCOSE BLDC GLUCOMTR-MCNC: 320 MG/DL — HIGH (ref 70–99)
GLUCOSE SERPL-MCNC: 317 MG/DL — HIGH (ref 70–99)
GLUCOSE UR QL: ABNORMAL
HBA1C BLD-MCNC: 6.7 % — HIGH (ref 4–5.6)
HCT VFR BLD CALC: 34.7 % — LOW (ref 39–50)
HGB BLD-MCNC: 11.9 G/DL — LOW (ref 13–17)
HYALINE CASTS # UR AUTO: 1 /LPF — SIGNIFICANT CHANGE UP (ref 0–2)
KETONES UR-MCNC: NEGATIVE — SIGNIFICANT CHANGE UP
LEUKOCYTE ESTERASE UR-ACNC: NEGATIVE — SIGNIFICANT CHANGE UP
MAGNESIUM SERPL-MCNC: 1.9 MG/DL — SIGNIFICANT CHANGE UP (ref 1.6–2.6)
MCHC RBC-ENTMCNC: 34.3 GM/DL — SIGNIFICANT CHANGE UP (ref 32–36)
MCHC RBC-ENTMCNC: 36.3 PG — HIGH (ref 27–34)
MCV RBC AUTO: 105.8 FL — HIGH (ref 80–100)
NITRITE UR-MCNC: NEGATIVE — SIGNIFICANT CHANGE UP
PH UR: 5.5 — SIGNIFICANT CHANGE UP (ref 5–8)
PHOSPHATE SERPL-MCNC: 2.5 MG/DL — SIGNIFICANT CHANGE UP (ref 2.5–4.5)
PLATELET # BLD AUTO: 144 K/UL — LOW (ref 150–400)
POTASSIUM SERPL-MCNC: 3.7 MMOL/L — SIGNIFICANT CHANGE UP (ref 3.5–5.3)
POTASSIUM SERPL-SCNC: 3.7 MMOL/L — SIGNIFICANT CHANGE UP (ref 3.5–5.3)
PROT UR-MCNC: ABNORMAL
RBC # BLD: 3.28 M/UL — LOW (ref 4.2–5.8)
RBC # FLD: 16.1 % — HIGH (ref 10.3–14.5)
RBC CASTS # UR COMP ASSIST: 2 /HPF — SIGNIFICANT CHANGE UP (ref 0–4)
SODIUM SERPL-SCNC: 133 MMOL/L — LOW (ref 135–145)
SP GR SPEC: 1.02 — SIGNIFICANT CHANGE UP (ref 1.01–1.02)
SPECIMEN SOURCE: SIGNIFICANT CHANGE UP
UROBILINOGEN FLD QL: NEGATIVE — SIGNIFICANT CHANGE UP
WBC # BLD: 10.38 K/UL — SIGNIFICANT CHANGE UP (ref 3.8–10.5)
WBC # FLD AUTO: 10.38 K/UL — SIGNIFICANT CHANGE UP (ref 3.8–10.5)
WBC UR QL: 0 /HPF — SIGNIFICANT CHANGE UP (ref 0–5)

## 2019-07-18 PROCEDURE — 99232 SBSQ HOSP IP/OBS MODERATE 35: CPT | Mod: GC

## 2019-07-18 PROCEDURE — 99232 SBSQ HOSP IP/OBS MODERATE 35: CPT

## 2019-07-18 RX ORDER — INSULIN GLARGINE 100 [IU]/ML
15 INJECTION, SOLUTION SUBCUTANEOUS
Qty: 0 | Refills: 0 | DISCHARGE

## 2019-07-18 RX ORDER — INSULIN LISPRO 100/ML
4 VIAL (ML) SUBCUTANEOUS
Refills: 0 | Status: DISCONTINUED | OUTPATIENT
Start: 2019-07-18 | End: 2019-07-19

## 2019-07-18 RX ORDER — GLUCAGON INJECTION, SOLUTION 0.5 MG/.1ML
1 INJECTION, SOLUTION SUBCUTANEOUS
Qty: 1 | Refills: 0
Start: 2019-07-18 | End: 2019-07-18

## 2019-07-18 RX ORDER — INSULIN GLARGINE 100 [IU]/ML
10 INJECTION, SOLUTION SUBCUTANEOUS
Qty: 0 | Refills: 0 | DISCHARGE

## 2019-07-18 RX ORDER — ASPIRIN/CALCIUM CARB/MAGNESIUM 324 MG
1 TABLET ORAL
Qty: 0 | Refills: 0 | DISCHARGE

## 2019-07-18 RX ORDER — INSULIN ASPART 100 [IU]/ML
6 INJECTION, SOLUTION SUBCUTANEOUS
Qty: 0 | Refills: 0 | DISCHARGE

## 2019-07-18 RX ORDER — FUROSEMIDE 40 MG
20 TABLET ORAL DAILY
Refills: 0 | Status: DISCONTINUED | OUTPATIENT
Start: 2019-07-19 | End: 2019-07-19

## 2019-07-18 RX ORDER — AMLODIPINE BESYLATE 2.5 MG/1
1 TABLET ORAL
Qty: 0 | Refills: 0 | DISCHARGE

## 2019-07-18 RX ORDER — POTASSIUM CHLORIDE 20 MEQ
0 PACKET (EA) ORAL
Qty: 0 | Refills: 0 | DISCHARGE

## 2019-07-18 RX ORDER — FUROSEMIDE 40 MG
1 TABLET ORAL
Qty: 30 | Refills: 0
Start: 2019-07-18

## 2019-07-18 RX ORDER — FUROSEMIDE 40 MG
1 TABLET ORAL
Qty: 0 | Refills: 0 | DISCHARGE

## 2019-07-18 RX ORDER — FUROSEMIDE 40 MG
2 TABLET ORAL
Qty: 0 | Refills: 0 | DISCHARGE
Start: 2019-07-18

## 2019-07-18 RX ORDER — OCTREOTIDE ACETATE 200 UG/ML
1 INJECTION, SOLUTION INTRAVENOUS; SUBCUTANEOUS
Qty: 0 | Refills: 0 | DISCHARGE

## 2019-07-18 RX ADMIN — ENOXAPARIN SODIUM 40 MILLIGRAM(S): 100 INJECTION SUBCUTANEOUS at 11:11

## 2019-07-18 RX ADMIN — Medication 50 MILLIGRAM(S): at 11:11

## 2019-07-18 RX ADMIN — Medication 1 TABLET(S): at 11:11

## 2019-07-18 RX ADMIN — INSULIN GLARGINE 10 UNIT(S): 100 INJECTION, SOLUTION SUBCUTANEOUS at 09:28

## 2019-07-18 RX ADMIN — Medication 2 UNIT(S): at 09:02

## 2019-07-18 RX ADMIN — AMLODIPINE BESYLATE 2.5 MILLIGRAM(S): 2.5 TABLET ORAL at 05:24

## 2019-07-18 RX ADMIN — LOSARTAN POTASSIUM 100 MILLIGRAM(S): 100 TABLET, FILM COATED ORAL at 05:24

## 2019-07-18 RX ADMIN — Medication 20 MILLIGRAM(S): at 05:24

## 2019-07-18 RX ADMIN — Medication 4: at 09:03

## 2019-07-18 RX ADMIN — Medication 137 MICROGRAM(S): at 09:03

## 2019-07-18 RX ADMIN — Medication 1.5 MILLIGRAM(S): at 22:09

## 2019-07-18 RX ADMIN — Medication 2: at 12:24

## 2019-07-18 RX ADMIN — Medication 20 MILLIGRAM(S): at 11:11

## 2019-07-18 RX ADMIN — CARVEDILOL PHOSPHATE 25 MILLIGRAM(S): 80 CAPSULE, EXTENDED RELEASE ORAL at 17:24

## 2019-07-18 RX ADMIN — CARVEDILOL PHOSPHATE 25 MILLIGRAM(S): 80 CAPSULE, EXTENDED RELEASE ORAL at 05:24

## 2019-07-18 NOTE — PROGRESS NOTE ADULT - PROBLEM SELECTOR PLAN 2
Hgb a1c of 7.0 in a patient with colon ca w/ mets may be too tight of a control.   -hgb a1c should be <8.0   -basaglar 10 units sc qhs  -humalog 2 units ac breakfast, 4 units ac dinner (increased from 2)  - FS TIDACHS  - low dose ISS TIDACHS      Discharge  -f/u with outpatient endocrinologist   -discharge with glucagon kit and teach patient+wife at bedside how to use it  -basiglar 10 units and humalog 2 units ACBK and ACDinner. Hgb a1c of 7.0 in a patient with colon ca w/ mets may be too tight of a control.   -hgb a1c should be <8.0   -basaglar 12 units sc qhs  -humalog 2 units ac breakfast, 2 untis with lunch and 4 units ac dinner (increased from 2)  - FS TIDACHS  - low dose ISS TIDACHS      Discharge  -f/u with outpatient endocrinologist   -discharge with glucagon kit and teach patient+wife at bedside how to use it  -basiglar 12 units and humalog 2 units ACBK and  4 units ACDinner.

## 2019-07-18 NOTE — PROGRESS NOTE ADULT - PROBLEM SELECTOR PLAN 1
Hypoglycemia likely 2/2 dinner time humalog dosage being too high. Hgb a1c of 7.0 in a patient with colon ca w/ mets may be too tight of a control.   -hgb a1c should be <8.0   -basaglar now at 10 units and humalog 2 units ac breakfast and 4 units ac dinner   -may take 2 additional units if having a snack/candy at bedtime  Discharge  -f/u with outpatient endocrinologist   -discharge with glucagon kit and teach patient+wife at bedside how to use it. (nurse will do later today) Hypoglycemia likely 2/2 dinner time humalog dosage being too high. Hgb a1c of 7.0 in a patient with colon ca w/ mets may be too tight of a control.   -hgb a1c should be <8.0   -Increase basaglar to 12 units and humalog 2 units ac breakfast, add 2 units at lunch and increase to 4 units ac dinner   -may take 2 additional units if having a snack/candy at bedtime  Discharge  -f/u with outpatient endocrinologist   -discharge with glucagon kit and teach patient+wife at bedside how to use it. (nurse will do later today)

## 2019-07-18 NOTE — DISCHARGE NOTE PROVIDER - NSDCPNSUBOBJ_GEN_ALL_CORE
Pt seen and examined - feels well. Symptomatic hypoglycemia is resolved - regimen adjusted per endo. Pt will f/u w/home endo, urology for transient hematuria (now resolved, no evidence of obstruction) and home oncologist    35 minutes spent orchestrating discharge

## 2019-07-18 NOTE — PHYSICAL THERAPY INITIAL EVALUATION ADULT - PERTINENT HX OF CURRENT PROBLEM, REHAB EVAL
Pt is a 77y/o M with PMHx of HTN, Colon CA w/ mets to liver and lung, T2DM (w/ mult episodes of hypoglycemia in the past), who p/w unresponsiveness 2/2 hypoglycemia. Hospital course complicated by mechanical fall in bathroom.

## 2019-07-18 NOTE — DISCHARGE NOTE PROVIDER - NSDCCPCAREPLAN_GEN_ALL_CORE_FT
PRINCIPAL DISCHARGE DIAGNOSIS  Diagnosis: Hypoglycemia  Assessment and Plan of Treatment: resolved      SECONDARY DISCHARGE DIAGNOSES  Diagnosis: Hematuria  Assessment and Plan of Treatment: resolved; follow up with urology    Diagnosis: Hypothyroidism, unspecified type  Assessment and Plan of Treatment: Hypothyroidism, unspecified type    Diagnosis: Type 2 diabetes mellitus without complication, with long-term current use of insulin  Assessment and Plan of Treatment: Type 2 diabetes mellitus without complication, with long-term current use of insulin PRINCIPAL DISCHARGE DIAGNOSIS  Diagnosis: Hypoglycemia  Assessment and Plan of Treatment: resolved. Problem: Hypoglycemia. Recommendation: Hypoglycemia likely 2/2 dinner time humalog dosage being too high. Hgb a1c of 7.0 in a patient with colon ca w/ mets may be too tight of a control.   -hgb a1c should be <8.0   -Discharge  -f/u with outpatient endocrinologist   -discharge with glucagon kit and teach patient+wife at bedside how to use it  -basiglar 10 units and humalog 2 units ACBK and ACDinner.         SECONDARY DISCHARGE DIAGNOSES  Diagnosis: Anxiety  Assessment and Plan of Treatment: stable with ativan, follow up with PMD    Diagnosis: Depression  Assessment and Plan of Treatment: stable with paxil    Diagnosis: Hypertension, unspecified type  Assessment and Plan of Treatment: Follow up with your medical doctor to establish long term blood pressure treatment goals.      Diagnosis: Colon cancer  Assessment and Plan of Treatment: please follow up with your PMD and oncologist for further care    Diagnosis: Hypothyroidism, unspecified type  Assessment and Plan of Treatment: you do not make enough thyroid hormone  signs & symptoms of low levels of thyroid hormone - tired, getting cold easily, coarse or thin hair, constipation, shortness of breath, swelling, irregular periods  your doctor will do thyroid hormone blood tests at least once a year to monitor if medication dose is adequate  take your thyroid medicine as directed by your doctor & on empty stomach      Diagnosis: Type 2 diabetes mellitus without complication, with long-term current use of insulin  Assessment and Plan of Treatment: Your next appointment with endocrinologist ([facility] endocrine ph: [phone])/PMD is -   HgA1C this admission -  Make sure you get your HgA1c checked every three months.  If you take oral diabetes medications, check your blood glucose two times a day.  If you take insulin, check your blood glucose before meals and at bedtime.  It's important not to skip any meals.  Keep a log of your blood glucose results and always take it with you to your doctor appointments.  Keep a list of your current medications including injectables and over the counter medications and bring this medication list with you to all your doctor appointments.  If you have not seen your ophthalmologist this year call for appointment.  Check your feet daily for redness, sores, or openings. Do not self treat. If no improvement in two days call your primary care physician for an appointment.  Low blood sugar (hypoglycemia) is a blood sugar below 70mg/dl. Check your blood sugar if you feel signs/symptoms of hypoglycemia. If your blood sugar is below 70 take 15 grams of carbohydrates (ex 4 oz of apple juice, 3-4 glucose tablets, or 4-6 oz of regular soda) wait 15 minutes and repeat blood sugar to make sure it comes up above 70.  If your blood sugar is above 70 and you are due for a meal, have a meal.  If you are not due for a meal have a snack.  This snack helps keeps your blood sugar at a safe range.      Diagnosis: Hematuria  Assessment and Plan of Treatment: resolved; follow up with urology PRINCIPAL DISCHARGE DIAGNOSIS  Diagnosis: Hypoglycemia  Assessment and Plan of Treatment: resolved. Problem: Hypoglycemia. Recommendation: Hypoglycemia likely 2/2 dinner time humalog dosage being too high. Hgb a1c of 7.0 in a patient with colon ca w/ mets may be too tight of a control.   -hgb a1c should be <8.0   -Discharge  -f/u with outpatient endocrinologist   -discharge with glucagon kit and teach patient+wife at bedside how to use it  -basiglar 10 units and humalog 2 units ACBK and ACDinner.         SECONDARY DISCHARGE DIAGNOSES  Diagnosis: Anxiety  Assessment and Plan of Treatment: stable with ativan, follow up with PMD    Diagnosis: Depression  Assessment and Plan of Treatment: stable with paxil    Diagnosis: Hypertension, unspecified type  Assessment and Plan of Treatment: Follow up with your medical doctor to establish long term blood pressure treatment goals.      Diagnosis: Colon cancer  Assessment and Plan of Treatment: please follow up with your PMD and oncologist for further care    Diagnosis: Hypothyroidism, unspecified type  Assessment and Plan of Treatment: you do not make enough thyroid hormone  signs & symptoms of low levels of thyroid hormone - tired, getting cold easily, coarse or thin hair, constipation, shortness of breath, swelling, irregular periods  your doctor will do thyroid hormone blood tests at least once a year to monitor if medication dose is adequate  take your thyroid medicine as directed by your doctor & on empty stomach      Diagnosis: Type 2 diabetes mellitus without complication, with long-term current use of insulin  Assessment and Plan of Treatment: Your next appointment with endocrinologist ([facility] endocrine ph: [phone])/PMD is -   HgA1C this admission -  Make sure you get your HgA1c checked every three months.  If you take oral diabetes medications, check your blood glucose two times a day.  If you take insulin, check your blood glucose before meals and at bedtime.  It's important not to skip any meals.  Keep a log of your blood glucose results and always take it with you to your doctor appointments.  Keep a list of your current medications including injectables and over the counter medications and bring this medication list with you to all your doctor appointments.  If you have not seen your ophthalmologist this year call for appointment.  Check your feet daily for redness, sores, or openings. Do not self treat. If no improvement in two days call your primary care physician for an appointment.  Low blood sugar (hypoglycemia) is a blood sugar below 70mg/dl. Check your blood sugar if you feel signs/symptoms of hypoglycemia. If your blood sugar is below 70 take 15 grams of carbohydrates (ex 4 oz of apple juice, 3-4 glucose tablets, or 4-6 oz of regular soda) wait 15 minutes and repeat blood sugar to make sure it comes up above 70.  If your blood sugar is above 70 and you are due for a meal, have a meal.  If you are not due for a meal have a snack.  This snack helps keeps your blood sugar at a safe range.      Diagnosis: Hematuria, unspecified type  Assessment and Plan of Treatment: Follow up with urologist to investigate transient blood in urine - Dr Raul Edwards    Diagnosis: Hematuria  Assessment and Plan of Treatment: resolved; follow up with urology

## 2019-07-18 NOTE — PHYSICAL THERAPY INITIAL EVALUATION ADULT - ADDITIONAL COMMENTS
As per pt, pt lives in a condo w/ spouse and no steps to enter w/ UHR. PTA pt was independent w/ all mobility and ADLs.

## 2019-07-18 NOTE — PROGRESS NOTE ADULT - PROBLEM SELECTOR PLAN 1
resolved  endo recs appreciated  restarting medications per recs  outpatient f/u - w/glucagon kit prescribed

## 2019-07-18 NOTE — DISCHARGE NOTE PROVIDER - CARE PROVIDER_API CALL
Toan Jones (DO)  EndocrinologyMetabDiabetes; Internal Medicine  [address] [place] [address]  Phone: [phone]  Fax: [phone]  Follow Up Time:     Raul Edwards)  Urology  [address] [place] [address]  Phone: [phone]  Fax: [phone]  Follow Up Time: Raul Edwards)  Urology  [address] [place] [address], [place] [address]  Phone: [phone]  Fax: [phone]  Follow Up Time:     Emil Porter  Phone: (   )    -  Fax: (   )    -  Follow Up Time:

## 2019-07-18 NOTE — CHART NOTE - NSCHARTNOTEFT_GEN_A_CORE
CC: Fall    Event Summary:  Notified by RN to evaluate patient s/p fall. Patient seen and examined to bedside, sitting in bed in NAD. Patient reports he slipped on the floor inside bathroom, had a partial fall onto buttocks as PCA was supporting him, was able to ambulate back to bed. Denies any trauma to head or spine, or LOC. Denies any presyncopal symptoms, dizziness, visual changes. Superficial abrasion noted to left forearm, otherwise patient denies any acute pain or injury.        Physical Exam  General: well-developed male sitting in bed in NAD, awake and alert, appears in NAD  Head: Normocephalic, atraumatic.   Neuro: A&Ox3, PERRLA, CNII-XII grossly intact, MAEx4, sensation to light touch intact  Card: S1/S2, RRR  Pulm: CTA b/l, respirations even and non-labored  Abd: soft, nondistended, nontender, +BS  MSK: no bony deformity, no midline tenderness along spine, no crepitus, 5/5 motor strength x4.   Skin: superficial abrasion to left forearm, no swelling, no ecchymosis, no hematoma      A/P: CC: Fall    Event Summary:  Notified by RN to evaluate patient s/p fall. Patient seen and examined to bedside, sitting in bed in NAD. Patient reports he lost his footing on the bathroom floor, had a partial fall onto buttocks as PCA was supporting him, and was able to ambulate back to bed. Denies any trauma to head or spine, or LOC. Denies any presyncopal symptoms, dizziness, visual changes. Superficial abrasion noted to left forearm, otherwise patient denies any acute pain or injury.        Physical Exam  General: well-developed male sitting in bed in NAD, awake and alert, appears in NAD  Head: Normocephalic, atraumatic.   Neuro: A&Ox3, PERRLA, CNII-XII grossly intact, MAEx4, sensation to light touch intact  Card: S1/S2, RRR  Pulm: CTA b/l, respirations even and non-labored  Abd: soft, nondistended, nontender, +BS  MSK: no bony deformity, no midline tenderness along spine, no crepitus, 5/5 motor strength x4.   Skin: superficial abrasion to left forearm, no swelling, no ecchymosis, no hematoma      A/P:  Pt is a 79y/o M with PMHx of HTN, Colon CA w/ mets to liver and lung, T2DM (w/ mult episodes of hypoglycemia in the past), who p/w unresponsiveness 2/2 hypoglycemia. Now s/p assisted fall.    # Fall, likely mechanical  - VS hemodynamically stable. .  - No imaging at this time as patient without acute pain and non-focal physical exam.   - Fall precautions in place (non-slip socks, bed alarm, educated on call bell).  - Continue to monitor patient closely.  Overnight [facility] notified. Will endorse to primary team in AM.      Belen Mancuso PA-C  Department of Medicine  #[id number]

## 2019-07-18 NOTE — DISCHARGE NOTE PROVIDER - CARE PROVIDERS DIRECT ADDRESSES
[email or web address]

## 2019-07-18 NOTE — DISCHARGE NOTE PROVIDER - HOSPITAL COURSE
Colleen is a 79y/o M with PMHx of HTN, Colon CA w/ mets to liver and lung, T2DM (w/ mult episodes of hypoglycemia in the past), who p/w unresponsiveness. Pt was brought into the ER for non responsiveness. He received 500 cc bolus and 1 amp of D50.    Basically pt is T2DM for 16 years, follows Dr. Hu outpatient for diabetes with hgb a1c of 7 and no complications of diabetes. He has neuropathy from chemo. Currently on metformin 1g qday, basiglar 15 units and humalog 2 units ac breakfast and 6-10 units ac dinner. Similar episode of hypoglycemia in the past, last hospitalization in system was last year where the insulin was reduced. Patient said he has lost ~15 lbs in the last year due to malignancy. He eats candy at night to try to prevent himself from having hypoglycemia overnight, about 1 time/month and he sometimes wakes up with FS of <70 in the AM as well. He didn't get to eat any candy after his dinner of chicken masala. Denies use of any other diabetes meds including sulfonylurea. Pt was seen by endocrinology Dr. Jones and adjusted his insulin regimen , Pt without any further episodes of hypoglycemia and will be discharge on the following regimen :Basiglar 10 units and humalog 2 units ACBK and ACDinner and keep hgb a1c should be <8.0 . Please follow up with Dr. Brooks for monitoring and adjustment as needed. Wife taught about treatment for hypoglycemia with glucagon kit. As it relates to his hypothyroidism continue with synthroid 137 mcg.     - Colleen is a 77y/o M with PMHx of HTN, Colon CA w/ mets to liver and lung, T2DM (w/ mult episodes of hypoglycemia in the past), who p/w unresponsiveness. Pt was brought into the ER for non responsiveness. He received 500 cc bolus and 1 amp of D50.    Basically pt is T2DM for 16 years, follows Dr. Hu outpatient for diabetes with hgb a1c of 7 and no complications of diabetes. He has neuropathy from chemo. Currently on metformin 1g qday, basiglar 15 units and humalog 2 units ac breakfast and 6-10 units ac dinner. Similar episode of hypoglycemia in the past, last hospitalization in system was last year where the insulin was reduced. Patient said he has lost ~15 lbs in the last year due to malignancy. He eats candy at night to try to prevent himself from having hypoglycemia overnight, about 1 time/month and he sometimes wakes up with FS of <70 in the AM as well. He didn't get to eat any candy after his dinner of chicken masala. Denies use of any other diabetes meds including sulfonylurea. Pt was seen by endocrinology Dr. Jones and adjusted his insulin regimen , Pt without any further episodes of hypoglycemia and will be discharge on the following regimen :Basiglar 12 units  at bedtime and humalog 2 units ACBK and ACDinner and keep hgb a1c should be <8.0 . Please follow up with Dr. Brooks for monitoring and adjustment as needed. Wife taught about treatment for hypoglycemia with glucagon kit. As it relates to his hypothyroidism continue with synthroid 137 mcg.     - Colleen is a 77y/o M with PMHx of HTN, Colon CA w/ mets to liver and lung, T2DM (w/ mult episodes of hypoglycemia in the past), who p/w unresponsiveness. Pt was brought into the ER for non responsiveness. He received 500 cc bolus and 1 amp of D50.    Basically pt is T2DM for 16 years, follows Dr. Hu outpatient for diabetes with hgb a1c of 7 and no complications of diabetes. He has neuropathy from chemo. Currently on metformin 1g qday, basiglar 15 units and humalog 2 units ac breakfast and 6-10 units ac dinner. Similar episode of hypoglycemia in the past, last hospitalization in system was last year where the insulin was reduced. Patient said he has lost ~15 lbs in the last year due to malignancy. He eats candy at night to try to prevent himself from having hypoglycemia overnight, about 1 time/month and he sometimes wakes up with FS of <70 in the AM as well. He didn't get to eat any candy after his dinner of chicken masala. Denies use of any other diabetes meds including sulfonylurea. Pt was seen by endocrinology Dr. Jones and adjusted his insulin regimen , Pt without any further episodes of hypoglycemia and will be discharge on the following regimen :Basiglar 12 units  at bedtime and humalog 2 units ACBK and ACDinner and keep hgb a1c should be <8.0 . Please follow up with Dr. Brooks for monitoring and adjustment as needed. Wife taught about treatment for hypoglycemia with glucagon kit. As it relates to his hypothyroidism continue with synthroid 137 mcg.     -            Attending Addendum    Discharge Diagnoses    1) T2DM w/hypoglycemia, long term insulin    2) Hematuria    3) Colon Ca Stage IV     4) HTN

## 2019-07-18 NOTE — DISCHARGE NOTE PROVIDER - PROVIDER TOKENS
PROVIDER:[TOKEN:[[id number]]],PROVIDER:[TOKEN:[[id number]]] PROVIDER:[TOKEN:[[id number]]],FREE:[LAST:[],FIRST:[Emil],PHONE:[(   )    -],FAX:[(   )    -]]

## 2019-07-18 NOTE — PROGRESS NOTE ADULT - PROBLEM SELECTOR PLAN 6
Pt has metastatic colon cancer, s/p multiple bowl resections, and liver/lung resections. Due for Chemo today at [facility].  - f/u o/p provider to reschedule

## 2019-07-19 ENCOUNTER — TRANSCRIPTION ENCOUNTER (OUTPATIENT)
Age: 79
End: 2019-07-19

## 2019-07-19 VITALS
HEART RATE: 71 BPM | TEMPERATURE: 97 F | OXYGEN SATURATION: 94 % | DIASTOLIC BLOOD PRESSURE: 78 MMHG | RESPIRATION RATE: 18 BRPM | SYSTOLIC BLOOD PRESSURE: 144 MMHG

## 2019-07-19 DIAGNOSIS — F41.9 ANXIETY DISORDER, UNSPECIFIED: ICD-10-CM

## 2019-07-19 PROCEDURE — 99239 HOSP IP/OBS DSCHRG MGMT >30: CPT

## 2019-07-19 PROCEDURE — 82435 ASSAY OF BLOOD CHLORIDE: CPT

## 2019-07-19 PROCEDURE — 82565 ASSAY OF CREATININE: CPT

## 2019-07-19 PROCEDURE — 84443 ASSAY THYROID STIM HORMONE: CPT

## 2019-07-19 PROCEDURE — 82947 ASSAY GLUCOSE BLOOD QUANT: CPT

## 2019-07-19 PROCEDURE — 87086 URINE CULTURE/COLONY COUNT: CPT

## 2019-07-19 PROCEDURE — 80048 BASIC METABOLIC PNL TOTAL CA: CPT

## 2019-07-19 PROCEDURE — 83605 ASSAY OF LACTIC ACID: CPT

## 2019-07-19 PROCEDURE — 80053 COMPREHEN METABOLIC PANEL: CPT

## 2019-07-19 PROCEDURE — 71045 X-RAY EXAM CHEST 1 VIEW: CPT

## 2019-07-19 PROCEDURE — 51701 INSERT BLADDER CATHETER: CPT

## 2019-07-19 PROCEDURE — 85014 HEMATOCRIT: CPT

## 2019-07-19 PROCEDURE — 84295 ASSAY OF SERUM SODIUM: CPT

## 2019-07-19 PROCEDURE — 84132 ASSAY OF SERUM POTASSIUM: CPT

## 2019-07-19 PROCEDURE — 96374 THER/PROPH/DIAG INJ IV PUSH: CPT | Mod: XU

## 2019-07-19 PROCEDURE — 87040 BLOOD CULTURE FOR BACTERIA: CPT

## 2019-07-19 PROCEDURE — 84481 FREE ASSAY (FT-3): CPT

## 2019-07-19 PROCEDURE — 82803 BLOOD GASES ANY COMBINATION: CPT

## 2019-07-19 PROCEDURE — 81001 URINALYSIS AUTO W/SCOPE: CPT

## 2019-07-19 PROCEDURE — 97161 PT EVAL LOW COMPLEX 20 MIN: CPT

## 2019-07-19 PROCEDURE — 82962 GLUCOSE BLOOD TEST: CPT

## 2019-07-19 PROCEDURE — 82330 ASSAY OF CALCIUM: CPT

## 2019-07-19 PROCEDURE — 83036 HEMOGLOBIN GLYCOSYLATED A1C: CPT

## 2019-07-19 PROCEDURE — 85027 COMPLETE CBC AUTOMATED: CPT

## 2019-07-19 PROCEDURE — 99232 SBSQ HOSP IP/OBS MODERATE 35: CPT | Mod: GC

## 2019-07-19 PROCEDURE — 84100 ASSAY OF PHOSPHORUS: CPT

## 2019-07-19 PROCEDURE — 83735 ASSAY OF MAGNESIUM: CPT

## 2019-07-19 PROCEDURE — 85610 PROTHROMBIN TIME: CPT

## 2019-07-19 PROCEDURE — 82533 TOTAL CORTISOL: CPT

## 2019-07-19 PROCEDURE — 93005 ELECTROCARDIOGRAM TRACING: CPT | Mod: XU

## 2019-07-19 PROCEDURE — 70450 CT HEAD/BRAIN W/O DYE: CPT

## 2019-07-19 PROCEDURE — 99285 EMERGENCY DEPT VISIT HI MDM: CPT | Mod: 25

## 2019-07-19 PROCEDURE — 85730 THROMBOPLASTIN TIME PARTIAL: CPT

## 2019-07-19 RX ORDER — INSULIN ASPART 100 [IU]/ML
6 INJECTION, SOLUTION SUBCUTANEOUS
Qty: 0 | Refills: 0 | DISCHARGE

## 2019-07-19 RX ORDER — INSULIN GLARGINE 100 [IU]/ML
10 INJECTION, SOLUTION SUBCUTANEOUS
Qty: 0 | Refills: 0 | DISCHARGE

## 2019-07-19 RX ORDER — INSULIN GLARGINE 100 [IU]/ML
12 INJECTION, SOLUTION SUBCUTANEOUS
Qty: 0 | Refills: 0 | DISCHARGE

## 2019-07-19 RX ORDER — METFORMIN HYDROCHLORIDE 850 MG/1
1 TABLET ORAL
Qty: 0 | Refills: 0 | DISCHARGE

## 2019-07-19 RX ADMIN — Medication 50 MILLIGRAM(S): at 13:24

## 2019-07-19 RX ADMIN — ENOXAPARIN SODIUM 40 MILLIGRAM(S): 100 INJECTION SUBCUTANEOUS at 12:36

## 2019-07-19 RX ADMIN — Medication 2 UNIT(S): at 08:26

## 2019-07-19 RX ADMIN — Medication 1 TABLET(S): at 12:35

## 2019-07-19 RX ADMIN — Medication 20 MILLIGRAM(S): at 05:37

## 2019-07-19 RX ADMIN — INSULIN GLARGINE 10 UNIT(S): 100 INJECTION, SOLUTION SUBCUTANEOUS at 08:26

## 2019-07-19 RX ADMIN — Medication 137 MICROGRAM(S): at 05:37

## 2019-07-19 RX ADMIN — CARVEDILOL PHOSPHATE 25 MILLIGRAM(S): 80 CAPSULE, EXTENDED RELEASE ORAL at 05:37

## 2019-07-19 RX ADMIN — Medication 2: at 12:36

## 2019-07-19 RX ADMIN — Medication 1: at 08:26

## 2019-07-19 RX ADMIN — Medication 20 MILLIGRAM(S): at 12:35

## 2019-07-19 RX ADMIN — LOSARTAN POTASSIUM 100 MILLIGRAM(S): 100 TABLET, FILM COATED ORAL at 05:37

## 2019-07-19 NOTE — PROGRESS NOTE ADULT - PROBLEM SELECTOR PLAN 1
Hypoglycemia likely 2/2 dinner time humalog dosage being too high. Hgb a1c of 7.0 in a patient with colon ca w/ mets may be too tight of a control.   -hgb a1c should be <8.0   -Increase basaglar to 12 units and humalog 2 units ac breakfast, add 2 units at lunch and increase to 4 units ac dinner   -may take 2 additional units if having a snack/candy at bedtime  Discharge  -f/u with outpatient endocrinologist   -discharge with glucagon kit and teach patient+wife at bedside how to use it. (nurse will do later today) Hypoglycemia likely 2/2 dinner time humalog dosage being too high. Hgb a1c of 7.0 in a patient with colon ca w/ mets may be too tight of a control.   -hgb a1c should be <8.0   -Increase basaglar to 12 units  - humalog 2 units ac breakfast and ac dinner  -may take 2 additional units if having a snack/candy at bedtime  Discharge  -f/u with outpatient endocrinologist   -discharge with glucagon kit and teach patient+wife at bedside how to use it. (nurse will do later today) Hypoglycemia likely 2/2 dinner time humalog dosage being too high. Hgb a1c of 7.0 in a patient with colon ca w/ mets may be too tight of a control.   -hgb a1c should be <8.0   -Continue basaglar 12 units  - humalog 2 units ac breakfast and ac dinner  -may take 2 additional units if having a snack/candy at bedtime  Discharge  -f/u with outpatient endocrinologist   -discharge with glucagon kit and teach patient+wife at bedside how to use it. (nurse will do later today)

## 2019-07-19 NOTE — PROGRESS NOTE ADULT - SUBJECTIVE AND OBJECTIVE BOX
Authored by Dr Ronald Villanueva [phone]  After hours or if no response please page Hospitalist service [phone]    Patient is a 78y old  Male who presents with a chief complaint of hypoglycemia (2019 14:26)    SUBJECTIVE / OVERNIGHT EVENTS: family at bedside. pt had fall last night/early this morning which he recounts was due to slipping on wet floor (after accidentally urinating on floor) - did not injure self aside from mild bruising on L arm from PCA who was present preventing fall to ground which prevented worsened injury. Presently feels well, minimal complaints.     MEDICATIONS  (STANDING):  amLODIPine   Tablet 2.5 milliGRAM(s) Oral daily  carvedilol 25 milliGRAM(s) Oral every 12 hours  dextrose 5%. 1000 milliLiter(s) (50 mL/Hr) IV Continuous <Continuous>  dextrose 50% Injectable 12.5 Gram(s) IV Push once  dextrose 50% Injectable 25 Gram(s) IV Push once  dextrose 50% Injectable 25 Gram(s) IV Push once  enoxaparin Injectable 40 milliGRAM(s) SubCutaneous daily  furosemide    Tablet 20 milliGRAM(s) Oral two times a day  insulin glargine Injectable (LANTUS) 10 Unit(s) SubCutaneous every morning  insulin lispro (HumaLOG) corrective regimen sliding scale   SubCutaneous at bedtime  insulin lispro (HumaLOG) corrective regimen sliding scale   SubCutaneous three times a day before meals  insulin lispro Injectable (HumaLOG) 4 Unit(s) SubCutaneous before dinner  insulin lispro Injectable (HumaLOG) 2 Unit(s) SubCutaneous before breakfast  levothyroxine 137 MICROGram(s) Oral daily  losartan 100 milliGRAM(s) Oral daily  multivitamin/minerals 1 Tablet(s) Oral daily  PARoxetine 20 milliGRAM(s) Oral daily  pyridoxine 50 milliGRAM(s) Oral daily    MEDICATIONS  (PRN):  dextrose 40% Gel 15 Gram(s) Oral once PRN Blood Glucose LESS THAN 70 milliGRAM(s)/deciliter  glucagon  Injectable 1 milliGRAM(s) IntraMuscular once PRN Glucose LESS THAN 70 milligrams/deciliter  LORazepam     Tablet 1.5 milliGRAM(s) Oral at bedtime PRN Anxiety      Vital Signs Last 24 Hrs  T(C): 36.8 (2019 09:45), Max: 36.8 (2019 00:53)  T(F): 98.3 (2019 09:45), Max: 98.3 (2019 00:53)  HR: 66 (2019 09:45) (66 - 69)  BP: 137/68 (2019 09:45) (122/56 - 155/78)  BP(mean): --  RR: 18 (2019 09:45) (18 - 20)  SpO2: 95% (2019 09:45) (94% - 96%)  CAPILLARY BLOOD GLUCOSE      POCT Blood Glucose.: 209 mg/dL (2019 12:11)  POCT Blood Glucose.: 314 mg/dL (2019 08:53)  POCT Blood Glucose.: 320 mg/dL (2019 03:52)  POCT Blood Glucose.: 294 mg/dL (2019 22:25)  POCT Blood Glucose.: 228 mg/dL (2019 17:51)    I&O's Summary    2019 07:01  -  2019 15:06  --------------------------------------------------------  IN: 0 mL / OUT: 300 mL / NET: -300 mL        PHYSICAL EXAM  General: NAD, comfortably resting in bed  Eyes: no conjunctival erythema  ENT: MMM  Neck: Neck supple, No JVD  Lung: CTA B/L, No wheezing, rales, rhonchi  Heart: RRR no murmurs  Abdomen: +abdominal chemo port. +abdomina hernia, surgical scars, Soft, NT, +BS  MSK: no focal weakness  Extremities: No edema, 2+ peripheral pulses  Neurology: A&Ox3, nonfocal, GRAMAJO x 4  Skin: small ecchymosis on L forearm  Psych: Calm and appropriate    LABS:                        11.9   10.38 )-----------( 144      ( 2019 09:50 )             34.7     07-18    133<L>  |  97  |  22  ----------------------------<  317<H>  3.7   |  26  |  1.05    Ca    8.0<L>      2019 07:21  Phos  2.5     07-18  Mg     1.9     -18    TPro  7.2  /  Alb  3.6  /  TBili  0.6  /  DBili  x   /  AST  28  /  ALT  16  /  AlkPhos  131<H>  -17    PT/INR - ( 2019 02:20 )   PT: 16.5 sec;   INR: 1.42 ratio         PTT - ( 2019 02:20 )  PTT:30.7 sec      Urinalysis Basic - ( 2019 14:21 )    Color: Yellow / Appearance: Clear / S.016 / pH: x  Gluc: x / Ketone: Negative  / Bili: Negative / Urobili: Negative   Blood: x / Protein: 30 mg/dL / Nitrite: Negative   Leuk Esterase: Negative / RBC: 2 /hpf / WBC 0 /HPF   Sq Epi: x / Non Sq Epi: 0 /hpf / Bacteria: Negative        Culture - Blood (collected 2019 05:14)  Source: .Blood  Preliminary Report (2019 06:01):    No growth to date.    Culture - Blood (collected 2019 05:14)  Source: .Blood  Preliminary Report (2019 06:01):    No growth to date.    Culture - Urine (collected 2019 05:07)  Source: .Urine  Final Report (2019 02:42):    No growth    RADIOLOGY & ADDITIONAL TESTS:    Imaging Personally Reviewed:  Consultant(s) Notes Reviewed:  endo  Care Discussed with Consultants/Other Providers:
Chief Complaint: T2DM     History: Feels well with no complaints. Has good appetite and wants to go home. Pending repeat ua.     MEDICATIONS  (STANDING):  amLODIPine   Tablet 2.5 milliGRAM(s) Oral daily  carvedilol 25 milliGRAM(s) Oral every 12 hours  dextrose 5%. 1000 milliLiter(s) (50 mL/Hr) IV Continuous <Continuous>  dextrose 50% Injectable 12.5 Gram(s) IV Push once  dextrose 50% Injectable 25 Gram(s) IV Push once  dextrose 50% Injectable 25 Gram(s) IV Push once  enoxaparin Injectable 40 milliGRAM(s) SubCutaneous daily  furosemide    Tablet 20 milliGRAM(s) Oral two times a day  insulin glargine Injectable (LANTUS) 10 Unit(s) SubCutaneous every morning  insulin lispro (HumaLOG) corrective regimen sliding scale   SubCutaneous at bedtime  insulin lispro (HumaLOG) corrective regimen sliding scale   SubCutaneous three times a day before meals  insulin lispro Injectable (HumaLOG) 4 Unit(s) SubCutaneous before dinner  insulin lispro Injectable (HumaLOG) 2 Unit(s) SubCutaneous before breakfast  levothyroxine 137 MICROGram(s) Oral daily  losartan 100 milliGRAM(s) Oral daily  multivitamin/minerals 1 Tablet(s) Oral daily  PARoxetine 20 milliGRAM(s) Oral daily  pyridoxine 50 milliGRAM(s) Oral daily    MEDICATIONS  (PRN):  dextrose 40% Gel 15 Gram(s) Oral once PRN Blood Glucose LESS THAN 70 milliGRAM(s)/deciliter  glucagon  Injectable 1 milliGRAM(s) IntraMuscular once PRN Glucose LESS THAN 70 milligrams/deciliter  LORazepam     Tablet 1.5 milliGRAM(s) Oral at bedtime PRN Anxiety      Allergies    No Known Allergies    Intolerances        PHYSICAL EXAM:  VITALS: T(C): 36.8 (07-18-19 @ 09:45)  T(F): 98.3 (07-18-19 @ 09:45), Max: 98.3 (07-18-19 @ 00:53)  HR: 66 (07-18-19 @ 09:45) (66 - 69)  BP: 137/68 (07-18-19 @ 09:45) (122/56 - 155/78)  RR:  (18 - 20)  SpO2:  (94% - 96%)  Wt(kg): --  GENERAL: NAD, well-groomed, well-developed  RESPIRATORY: Clear to auscultation bilaterally; No rales, rhonchi, wheezing, or rubs  CARDIOVASCULAR: Regular rate and rhythm; No murmurs  NEURO: AOx3, moves all extremities spontaenuously   PSYCH:  reactive affect, euthymic mood      POCT Blood Glucose.: 209 mg/dL (07-18-19 @ 12:11) H+2  POCT Blood Glucose.: 314 mg/dL (07-18-19 @ 08:53)H2+4  POCT Blood Glucose.: 320 mg/dL (07-18-19 @ 03:52)  POCT Blood Glucose.: 294 mg/dL (07-17-19 @ 22:25)L10  POCT Blood Glucose.: 228 mg/dL (07-17-19 @ 17:51)H2+2  POCT Blood Glucose.: 245 mg/dL (07-17-19 @ 13:20)H2  POCT Blood Glucose.: 170 mg/dL (07-17-19 @ 07:21)  POCT Blood Glucose.: 114 mg/dL (07-17-19 @ 04:15)  POCT Blood Glucose.: 136 mg/dL (07-17-19 @ 03:37)  POCT Blood Glucose.: 138 mg/dL (07-17-19 @ 02:53)  POCT Blood Glucose.: 53 mg/dL (07-17-19 @ 02:21)  POCT Blood Glucose.: 94 mg/dL (07-17-19 @ 01:24)      07-18    133<L>  |  97  |  22  ----------------------------<  317<H>  3.7   |  26  |  1.05    EGFR if : 78  EGFR if non : 67    Ca    8.0<L>      07-18  Mg     1.9     07-18  Phos  2.5     07-18    TPro  7.2  /  Alb  3.6  /  TBili  0.6  /  DBili  x   /  AST  28  /  ALT  16  /  AlkPhos  131<H>  07-17          Thyroid Function Tests:  07-17 @ 14:02 TSH 6.21 FreeT4 -- T3 -- Anti TPO -- Anti Thyroglobulin Ab -- TSI --      Hemoglobin A1C, Whole Blood: 6.7 % <H> [4.0 - 5.6] (07-18-19 @ 09:50)
Chief Complaint: T2DM     History: Feels welll, plan for discharge today.     MEDICATIONS  (STANDING):  carvedilol 25 milliGRAM(s) Oral every 12 hours  dextrose 5%. 1000 milliLiter(s) (50 mL/Hr) IV Continuous <Continuous>  dextrose 50% Injectable 12.5 Gram(s) IV Push once  dextrose 50% Injectable 25 Gram(s) IV Push once  dextrose 50% Injectable 25 Gram(s) IV Push once  enoxaparin Injectable 40 milliGRAM(s) SubCutaneous daily  furosemide    Tablet 20 milliGRAM(s) Oral daily  insulin glargine Injectable (LANTUS) 10 Unit(s) SubCutaneous every morning  insulin lispro (HumaLOG) corrective regimen sliding scale   SubCutaneous at bedtime  insulin lispro (HumaLOG) corrective regimen sliding scale   SubCutaneous three times a day before meals  insulin lispro Injectable (HumaLOG) 4 Unit(s) SubCutaneous before dinner  insulin lispro Injectable (HumaLOG) 2 Unit(s) SubCutaneous before breakfast  levothyroxine 137 MICROGram(s) Oral daily  losartan 100 milliGRAM(s) Oral daily  multivitamin/minerals 1 Tablet(s) Oral daily  PARoxetine 20 milliGRAM(s) Oral daily  pyridoxine 50 milliGRAM(s) Oral daily    MEDICATIONS  (PRN):  dextrose 40% Gel 15 Gram(s) Oral once PRN Blood Glucose LESS THAN 70 milliGRAM(s)/deciliter  glucagon  Injectable 1 milliGRAM(s) IntraMuscular once PRN Glucose LESS THAN 70 milligrams/deciliter  LORazepam     Tablet 1.5 milliGRAM(s) Oral at bedtime PRN Anxiety      Allergies    No Known Allergies    Intolerances        PHYSICAL EXAM:  VITALS: T(C): 36.3 (07-19-19 @ 08:33)  T(F): 97.4 (07-19-19 @ 08:33), Max: 98.1 (07-19-19 @ 00:08)  HR: 71 (07-19-19 @ 08:33) (57 - 71)  BP: 144/78 (07-19-19 @ 08:33) (133/63 - 162/81)  RR:  (18 - 18)  SpO2:  (93% - 94%)  Wt(kg): --  GENERAL: NAD, well-groomed, well-developed  RESPIRATORY: Clear to auscultation bilaterally; No rales, rhonchi, wheezing, or rubs  CARDIOVASCULAR: Regular rate and rhythm; No murmurs  NEURO: AOx3, moves all extremities spontaenuously   PSYCH:  reactive affect, euthymic mood      POCT Blood Glucose.: 208 mg/dL (07-19-19 @ 12:31)H+2  POCT Blood Glucose.: 164 mg/dL (07-19-19 @ 08:22)L10 H2+1  POCT Blood Glucose.: 131 mg/dL (07-18-19 @ 21:14)  POCT Blood Glucose.: 87 mg/dL (07-18-19 @ 18:38)H 0  POCT Blood Glucose.: 209 mg/dL (07-18-19 @ 12:11)H+2  POCT Blood Glucose.: 314 mg/dL (07-18-19 @ 08:53)H2+4  POCT Blood Glucose.: 320 mg/dL (07-18-19 @ 03:52)  POCT Blood Glucose.: 294 mg/dL (07-17-19 @ 22:25)  POCT Blood Glucose.: 228 mg/dL (07-17-19 @ 17:51)  POCT Blood Glucose.: 245 mg/dL (07-17-19 @ 13:20)  POCT Blood Glucose.: 170 mg/dL (07-17-19 @ 07:21)  POCT Blood Glucose.: 114 mg/dL (07-17-19 @ 04:15)  POCT Blood Glucose.: 136 mg/dL (07-17-19 @ 03:37)  POCT Blood Glucose.: 138 mg/dL (07-17-19 @ 02:53)  POCT Blood Glucose.: 53 mg/dL (07-17-19 @ 02:21)  POCT Blood Glucose.: 94 mg/dL (07-17-19 @ 01:24)      07-18    133<L>  |  97  |  22  ----------------------------<  317<H>  3.7   |  26  |  1.05    EGFR if : 78  EGFR if non : 67    Ca    8.0<L>      07-18  Mg     1.9     07-18  Phos  2.5     07-18    TPro  7.2  /  Alb  3.6  /  TBili  0.6  /  DBili  x   /  AST  28  /  ALT  16  /  AlkPhos  131<H>  07-17          Thyroid Function Tests:  07-17 @ 14:02 TSH 6.21 FreeT4 -- T3 -- Anti TPO -- Anti Thyroglobulin Ab -- TSI --      Hemoglobin A1C, Whole Blood: 6.7 % <H> [4.0 - 5.6] (07-18-19 @ 09:50)

## 2019-07-19 NOTE — PROGRESS NOTE ADULT - PROBLEM SELECTOR PLAN 2
Hgb a1c of 7.0 in a patient with colon ca w/ mets may be too tight of a control.   -hgb a1c should be <8.0   -basaglar 12 units sc qhs  -humalog 2 units ac breakfast, 2 untis with lunch and 4 units ac dinner (increased from 2)  - FS TIDACHS  - low dose ISS TIDACHS      Discharge  -f/u with outpatient endocrinologist   -discharge with glucagon kit and teach patient+wife at bedside how to use it  -basiglar 12 units and humalog 2 units ACBK and  4 units ACDinner. Hgb a1c of 7.0 in a patient with colon ca w/ mets may be too tight of a control.   -hgb a1c should be <8.0   -basaglar 12 units sc qhs  -humalog 2 units ac breakfast, 2 units ac dinner   - FS TIDACHS  - low dose ISS TIDACHS      Discharge  -f/u with outpatient endocrinologist   -discharge with glucagon kit and teach patient+wife at bedside how to use it  -basiglar 12 units and humalog 2 units ACBK and  2 units ACDinner.

## 2019-07-19 NOTE — DISCHARGE NOTE NURSING/CASE MANAGEMENT/SOCIAL WORK - NSDCDPATPORTLINK_GEN_ALL_CORE
You can access the [facility] Patient Portal, offered by [facility], by registering with the following website: [email or web address]

## 2019-07-19 NOTE — PROGRESS NOTE ADULT - PROBLEM SELECTOR PLAN 3
TSH 6.2 with T3 1.57 on synthroid 137 mcg. Elderly population may have TSH up to 7.5 and patient has been taking it incorrectly.   -c/w synthroid at 137 mcg  -outpatient endo f/u.
TSH 6.2 with T3 1.57 on synthroid 137 mcg. Elderly population may have TSH up to 7.5 and patient has been taking it incorrectly.   -c/w synthroid at 137 mcg  -outpatient endo f/u.
noted on 2nd UA - no hx of parekh insertion this hospitalization, has resolved on repeat  outpatient urology for transient hematuria - no evidence of obstruction - discussed in detail w/pt and family

## 2019-07-19 NOTE — PROGRESS NOTE ADULT - PROBLEM SELECTOR PLAN 5
-receives octreotide (sandostatin) qmonthly treatment from outpatient endocrinologist.   Last injection 7/15/19
-receives octreotide (sandostatin) qmonthly treatment from outpatient endocrinologist.   Last injection 7/15/19
restart home meds w/adjustments - mild orthostasis - modify regimen  d/c amlodipine  change lasix to daily

## 2019-07-19 NOTE — PROGRESS NOTE ADULT - PROBLEM SELECTOR PLAN 4
at goal on amlodipine and losartan.
at goal on amlodipine and losartan.
noted labs - endo recs appreciated

## 2019-07-19 NOTE — PROGRESS NOTE ADULT - ASSESSMENT
Pt is a 79y/o M with PMHx of HTN, Colon CA w/ mets to liver and lung, T2DM (w/ mult episodes of hypoglycemia in the past), who p/w unresponsiveness 2/2 hypoglycemia
77y/o M with PMHx of HTN, Colon CA w/ mets to liver and lung, T2DM (w/ mult episodes of hypoglycemia in the past), who p/w unresponsiveness 2/2 hypoglycemia. Patient is on metformin 1g qday, basiglar 15 units and humalog 2 units ac breakfast and 6-10 units ac dinner. Takes snacks (candy) to prevent overnight hypoglycemia and didn't get to eat one last night. Hypoglycemia likely 2/2 dinner time humalog dosage being too high.
77y/o M with PMHx of HTN, Colon CA w/ mets to liver and lung, T2DM (w/ mult episodes of hypoglycemia in the past), who p/w unresponsiveness 2/2 hypoglycemia. Patient is on metformin 1g qday, basiglar 15 units and humalog 2 units ac breakfast and 6-10 units ac dinner. Takes snacks (candy) to prevent overnight hypoglycemia and didn't get to eat one last night. Hypoglycemia likely 2/2 dinner time humalog dosage being too high.

## 2019-07-19 NOTE — CHART NOTE - NSCHARTNOTEFT_GEN_A_CORE
Spoke with Tila LEVINE for the patient. Discharge rec of Humalog 2 units ac breakfast and ac dinner, Basaglar 12 units qhs. Spoke with Tila LEVINE for the patient. Discharge rec of Humalog 2 units ac breakfast and ac dinner, Basaglar 12 units qam.

## 2019-07-19 NOTE — PROGRESS NOTE ADULT - ATTENDING COMMENTS
Agree with assessment and plan as above by Dr. Chino. Reviewed all pertinent labs, glucose values, and imaging studies. Modifications made as indicated above.     Toan Jones D.O  [phone]
Agree with assessment and plan as above by Dr. Chino. Reviewed all pertinent labs, glucose values, and imaging studies. Modifications made as indicated above.     Toan Jones D.O  [phone]
45 minutes spent orchestrating discharge

## 2019-07-19 NOTE — PROGRESS NOTE ADULT - PROBLEM SELECTOR PROBLEM 2
Type 2 diabetes mellitus
Type 2 diabetes mellitus without complication, with long-term current use of insulin
Type 2 diabetes mellitus without complication, with long-term current use of insulin

## 2019-07-19 NOTE — PHARMACOTHERAPY INTERVENTION NOTE - COMMENTS
Pharmacy intern discussed discharge medication name, dose, frequency, and side effects. Medication information handout provided from Med Essential Fact Sheet ([employer]®) – printed and sent to patient portal.

## 2019-07-22 LAB
CULTURE RESULTS: SIGNIFICANT CHANGE UP
CULTURE RESULTS: SIGNIFICANT CHANGE UP
SPECIMEN SOURCE: SIGNIFICANT CHANGE UP
SPECIMEN SOURCE: SIGNIFICANT CHANGE UP

## 2019-07-23 LAB
CORTICOSTEROID BINDING GLOBULIN RESULT: 1.3 MG/DL — LOW
CORTIS F/TOTAL MFR SERPL: 60 % — SIGNIFICANT CHANGE UP
CORTIS SERPL-MCNC: 22 UG/DL — HIGH
CORTISOL, FREE RESULT: 13 UG/DL — HIGH

## 2019-09-09 ENCOUNTER — INPATIENT (INPATIENT)
Facility: HOSPITAL | Age: 79
LOS: 8 days | Discharge: HOSPICE | DRG: 637 | End: 2019-09-18
Attending: HOSPITALIST | Admitting: STUDENT IN AN ORGANIZED HEALTH CARE EDUCATION/TRAINING PROGRAM
Payer: MEDICARE

## 2019-09-09 VITALS
RESPIRATION RATE: 28 BRPM | OXYGEN SATURATION: 99 % | HEART RATE: 64 BPM | SYSTOLIC BLOOD PRESSURE: 180 MMHG | DIASTOLIC BLOOD PRESSURE: 92 MMHG

## 2019-09-09 DIAGNOSIS — Z90.49 ACQUIRED ABSENCE OF OTHER SPECIFIED PARTS OF DIGESTIVE TRACT: Chronic | ICD-10-CM

## 2019-09-09 LAB
ALBUMIN SERPL ELPH-MCNC: 3.5 G/DL — SIGNIFICANT CHANGE UP (ref 3.3–5)
ALP SERPL-CCNC: 125 U/L — HIGH (ref 40–120)
ALT FLD-CCNC: 17 U/L — SIGNIFICANT CHANGE UP (ref 10–45)
AMMONIA BLD-MCNC: 33 UMOL/L — SIGNIFICANT CHANGE UP (ref 11–55)
ANION GAP SERPL CALC-SCNC: 11 MMOL/L — SIGNIFICANT CHANGE UP (ref 5–17)
AST SERPL-CCNC: 25 U/L — SIGNIFICANT CHANGE UP (ref 10–40)
BILIRUB SERPL-MCNC: 0.9 MG/DL — SIGNIFICANT CHANGE UP (ref 0.2–1.2)
BUN SERPL-MCNC: 19 MG/DL — SIGNIFICANT CHANGE UP (ref 7–23)
CALCIUM SERPL-MCNC: 9.6 MG/DL — SIGNIFICANT CHANGE UP (ref 8.4–10.5)
CHLORIDE SERPL-SCNC: 97 MMOL/L — SIGNIFICANT CHANGE UP (ref 96–108)
CO2 SERPL-SCNC: 27 MMOL/L — SIGNIFICANT CHANGE UP (ref 22–31)
CREAT SERPL-MCNC: 1.14 MG/DL — SIGNIFICANT CHANGE UP (ref 0.5–1.3)
GAS PNL BLDV: SIGNIFICANT CHANGE UP
GLUCOSE SERPL-MCNC: 80 MG/DL — SIGNIFICANT CHANGE UP (ref 70–99)
HCT VFR BLD CALC: 42.8 % — SIGNIFICANT CHANGE UP (ref 39–50)
HGB BLD-MCNC: 15.1 G/DL — SIGNIFICANT CHANGE UP (ref 13–17)
MCHC RBC-ENTMCNC: 35.4 GM/DL — SIGNIFICANT CHANGE UP (ref 32–36)
MCHC RBC-ENTMCNC: 38.6 PG — HIGH (ref 27–34)
MCV RBC AUTO: 109 FL — HIGH (ref 80–100)
PLATELET # BLD AUTO: 205 K/UL — SIGNIFICANT CHANGE UP (ref 150–400)
POTASSIUM SERPL-MCNC: 4.3 MMOL/L — SIGNIFICANT CHANGE UP (ref 3.5–5.3)
POTASSIUM SERPL-SCNC: 4.3 MMOL/L — SIGNIFICANT CHANGE UP (ref 3.5–5.3)
PROT SERPL-MCNC: 7.4 G/DL — SIGNIFICANT CHANGE UP (ref 6–8.3)
RBC # BLD: 3.92 M/UL — LOW (ref 4.2–5.8)
RBC # FLD: 15 % — HIGH (ref 10.3–14.5)
SODIUM SERPL-SCNC: 135 MMOL/L — SIGNIFICANT CHANGE UP (ref 135–145)
WBC # BLD: 8.6 K/UL — SIGNIFICANT CHANGE UP (ref 3.8–10.5)
WBC # FLD AUTO: 8.6 K/UL — SIGNIFICANT CHANGE UP (ref 3.8–10.5)

## 2019-09-09 PROCEDURE — 31500 INSERT EMERGENCY AIRWAY: CPT | Mod: GC

## 2019-09-09 PROCEDURE — 70496 CT ANGIOGRAPHY HEAD: CPT | Mod: 26

## 2019-09-09 PROCEDURE — 70450 CT HEAD/BRAIN W/O DYE: CPT | Mod: 26

## 2019-09-09 PROCEDURE — 99291 CRITICAL CARE FIRST HOUR: CPT | Mod: 25,GC

## 2019-09-09 PROCEDURE — 71045 X-RAY EXAM CHEST 1 VIEW: CPT | Mod: 26

## 2019-09-09 PROCEDURE — 70498 CT ANGIOGRAPHY NECK: CPT | Mod: 26

## 2019-09-09 RX ORDER — ETOMIDATE 2 MG/ML
20 INJECTION INTRAVENOUS ONCE
Refills: 0 | Status: COMPLETED | OUTPATIENT
Start: 2019-09-09 | End: 2019-09-09

## 2019-09-09 RX ORDER — CHLORHEXIDINE GLUCONATE 213 G/1000ML
15 SOLUTION TOPICAL EVERY 12 HOURS
Refills: 0 | Status: DISCONTINUED | OUTPATIENT
Start: 2019-09-09 | End: 2019-09-11

## 2019-09-09 RX ORDER — SUCCINYLCHOLINE CHLORIDE 100 MG/5ML
100 SYRINGE (ML) INTRAVENOUS ONCE
Refills: 0 | Status: COMPLETED | OUTPATIENT
Start: 2019-09-09 | End: 2019-09-09

## 2019-09-09 RX ORDER — MIDAZOLAM HYDROCHLORIDE 1 MG/ML
2 INJECTION, SOLUTION INTRAMUSCULAR; INTRAVENOUS ONCE
Refills: 0 | Status: DISCONTINUED | OUTPATIENT
Start: 2019-09-09 | End: 2019-09-09

## 2019-09-09 RX ORDER — PROPOFOL 10 MG/ML
10 INJECTION, EMULSION INTRAVENOUS
Qty: 500 | Refills: 0 | Status: DISCONTINUED | OUTPATIENT
Start: 2019-09-09 | End: 2019-09-11

## 2019-09-09 RX ADMIN — ETOMIDATE 20 MILLIGRAM(S): 2 INJECTION INTRAVENOUS at 22:09

## 2019-09-09 RX ADMIN — MIDAZOLAM HYDROCHLORIDE 2 MILLIGRAM(S): 1 INJECTION, SOLUTION INTRAMUSCULAR; INTRAVENOUS at 22:07

## 2019-09-09 RX ADMIN — PROPOFOL 4.2 MICROGRAM(S)/KG/MIN: 10 INJECTION, EMULSION INTRAVENOUS at 23:40

## 2019-09-09 RX ADMIN — Medication 100 MILLIGRAM(S): at 22:09

## 2019-09-09 NOTE — ED PROVIDER NOTE - PHYSICAL EXAMINATION
General: agitated, writhing around in stretcher, ncat, retained food in mouth  HEENT: EOMI, PERRLA, normal mucosa, normal oropharynx, no lesions on the lips or on oral mucosa, normal external ear  Neck: supple, no lymphadenopathy, full range of motion, no nuchal rigidity  CV: RRR, normal S1 and S2 with no murmur, capillary refill less than two seconds  Resp: lungs CTA b/l, good aeration bilaterally, symmetric chest wall   Abd: non-distended, soft, non-tender  : no CVA tenderness  MSK: full range of motion, no cyanosis, no edema, no clubbing, no immobility  Neuro: moving all extremities, responding to commands, no verbal response

## 2019-09-09 NOTE — ED PROVIDER NOTE - ATTENDING CONTRIBUTION TO CARE
79 yom pmh metastatic colon Ca, HTN, DM, hypothyroidism here for AMS. Patient was noted to be in USOH by wife earlier on in day. Around 7 hours prior to arrival, patient was sitting a desk and endorsed to wife "not feeling well" so he laid down. Then 3 hours prior to arrival, wife noted he was in fetal position lying on bed no responding to her shaking him. Patient has had previous episodes of hypoglycemia requiring hospitalization so wife tried to give him some sugar but did not get better so called EMS. initialy FSG  per EMS 250s, first repeat in ED 99 and second 80. Patient not following any commands, combative and flailing in bed. possible post-ictal vs. ICH vs. ongoing seizure vs. mets to brain. patient moved to critical area. attempted 2mg versed IV without improvement. decision made to intubate for stat head CT. labs, tox, CT imaging. will need micu consult. 79 yom pmh metastatic colon Ca, HTN, DM, hypothyroidism here for AMS. Patient was noted to be in USOH by wife earlier on in day. Around 7 hours prior to arrival, patient was sitting a desk and endorsed to wife "not feeling well" so he laid down. Then 3 hours prior to arrival, wife noted he was in fetal position lying on bed no responding to her shaking him. Patient has had previous episodes of hypoglycemia requiring hospitalization so wife tried to give him some sugar but did not get better so called EMS. initialy FSG  per EMS 250s, first repeat in ED 99 and second 80. Patient not following any commands, combative and flailing in bed. possible post-ictal vs. ICH vs. ongoing seizure vs. mets to brain. patient moved to critical area. attempted 2mg versed IV without improvement. decision made to intubate for stat head CT. labs, tox, CT imaging. will need micu

## 2019-09-09 NOTE — ED ADULT NURSE REASSESSMENT NOTE - NS ED NURSE REASSESS COMMENT FT1
Pt straight cathed using sterile technique. Second RN present to confirm sterility. 300ml of light yellow urine output noted. Pt tolerated procedure well. Sterile specimens collected and sent to lab. Will cont to monitor.

## 2019-09-09 NOTE — ED PROVIDER NOTE - CLINICAL SUMMARY MEDICAL DECISION MAKING FREE TEXT BOX
80 yo m pw AMS, agitated, unable to cooperate w exam. not protecting airway. required urgent intervention and intubation for airway protection and to go to CT. pt did not respond to versed. transferred to critical part of ED. MICU consult. labs.

## 2019-09-09 NOTE — ED PROVIDER NOTE - PROGRESS NOTE DETAILS
Amina PGY3: patient signed out to me from [facility] side @ 10 pm, brought over to trauma C from [facility] for AMS requiring intubation. Jaswinder, pgy3: pt becoming hypotensive on propofol gtt, becoming agitated on vent when propofol lowered, will start precedex gtt to help with sedation. Pt BP tolerating lowered prop and precedex

## 2019-09-09 NOTE — ED ADULT TRIAGE NOTE - SPO2 (%)
"Gilmar HORAN EtienneAilin discharged [facility] at [facility] time.    Discharge instructions, which include signs and symptoms to monitor patient for, hydration importance, hand hygiene importance, as well as detailed information regarding flu- like symptoms provided to aunt.     Aunt verbalized understanding with no further questions and/or concerns.     Copy of discharge paperwork provided to aunt.  Signed copy in chart.       Prescription for tamiflu provided to patient.   Tylenol/Motrin dosing sheet with the appropriate dose per the patient's current weight was highlighted and provided to aunt.      Patient ambulatory out of department with aunt.    Patient leaves in no apparent distress, is awake, alert, pink, interactive and age appropriate. Family is aware of the need to return to the ER for any concerns or changes in current condition.    BP 97/60   Pulse 110   Temp 37.3 °C (99.1 °F)   Resp 20   Ht 1.346 m (4' 5\")   Wt 32.2 kg (70 lb 15.8 oz)   SpO2 99%   BMI 17.77 kg/m²       " 99

## 2019-09-09 NOTE — ED ADULT NURSE NOTE - OBJECTIVE STATEMENT
80 y/o male alert to pain, pmhx DM, hypothyroid, colon ca stage IV, coming from home for AMS. Pt originally assigned to [facility], transferred to  room C for intubation. As per RICHIE Jones, pt was altered at home, family called EMS; pt has had history of hypoglycemia; FS by EMS over 100; repeat FS in ED was 99, 20 minutes later FS was 80, 1 amp D50 given. Pt transferred to [facility] for intubation as pt was unable to protect his airway. ETT placed without incident, please see MAR for medications given; ET tube 7.5, lip line 23. Pt on CM with spo2. Pt left in position of comfort; guard rails up, bed low and locked. Will reassess

## 2019-09-09 NOTE — ED ADULT NURSE NOTE - NSIMPLEMENTINTERV_GEN_ALL_ED
Implemented All Fall Risk Interventions:  [place] to call system. Call bell, personal items and telephone within reach. Instruct patient to call for assistance. Room bathroom lighting operational. Non-slip footwear when patient is off stretcher. Physically safe environment: no spills, clutter or unnecessary equipment. Stretcher in lowest position, wheels locked, appropriate side rails in place. Provide visual cue, wrist band, yellow gown, etc. Monitor gait and stability. Monitor for mental status changes and reorient to person, place, and time. Review medications for side effects contributing to fall risk. Reinforce activity limits and safety measures with patient and family.

## 2019-09-09 NOTE — ED ADULT NURSE REASSESSMENT NOTE - NS ED NURSE REASSESS COMMENT FT1
BP 81/58 at this time; propofol infusion paused for 5 minutes and NS 0.9% infusion; MD Alex at bedside. Propofol to be restarted at 25mcg; pt on CM with spo2 on vent with fiO2 at 50%.

## 2019-09-09 NOTE — ED PROVIDER NOTE - OBJECTIVE STATEMENT
80 yo m pmh metastatic colon Ca on active chemo, HTN, DM, hypothyroidism pw AMS. per wife pt was at baseline at 2000, returned to room at 2015 and found to be in fetal position, unresponsive. pt has had multiple episodes of similar sx in past, was found to be hypoglycemic. today pts wife unable to obtain FS when pt was having new sx. pt was given "intranasal glucose" and sugar gummies, EMS arrived and FS was 215. pt was agitated upon arrival to ED. FS in ED 99 and repeat in 80s. pt was agitated upon arrival, responding only to commands, unable to cooperate w/ h&p.

## 2019-09-10 DIAGNOSIS — R41.82 ALTERED MENTAL STATUS, UNSPECIFIED: ICD-10-CM

## 2019-09-10 LAB
ALBUMIN SERPL ELPH-MCNC: 2.8 G/DL — LOW (ref 3.3–5)
ALP SERPL-CCNC: 99 U/L — SIGNIFICANT CHANGE UP (ref 40–120)
ALT FLD-CCNC: 13 U/L — SIGNIFICANT CHANGE UP (ref 10–45)
ANION GAP SERPL CALC-SCNC: 10 MMOL/L — SIGNIFICANT CHANGE UP (ref 5–17)
APPEARANCE UR: CLEAR — SIGNIFICANT CHANGE UP
APTT BLD: 29.9 SEC — SIGNIFICANT CHANGE UP (ref 27.5–36.3)
AST SERPL-CCNC: 23 U/L — SIGNIFICANT CHANGE UP (ref 10–40)
BASOPHILS # BLD AUTO: 0.6 K/UL — HIGH (ref 0–0.2)
BASOPHILS NFR BLD AUTO: 7 % — HIGH (ref 0–2)
BILIRUB SERPL-MCNC: 1 MG/DL — SIGNIFICANT CHANGE UP (ref 0.2–1.2)
BILIRUB UR-MCNC: NEGATIVE — SIGNIFICANT CHANGE UP
BUN SERPL-MCNC: 19 MG/DL — SIGNIFICANT CHANGE UP (ref 7–23)
CALCIUM SERPL-MCNC: 8.5 MG/DL — SIGNIFICANT CHANGE UP (ref 8.4–10.5)
CHLORIDE SERPL-SCNC: 99 MMOL/L — SIGNIFICANT CHANGE UP (ref 96–108)
CO2 SERPL-SCNC: 22 MMOL/L — SIGNIFICANT CHANGE UP (ref 22–31)
COLOR SPEC: SIGNIFICANT CHANGE UP
CREAT SERPL-MCNC: 1.01 MG/DL — SIGNIFICANT CHANGE UP (ref 0.5–1.3)
CULTURE RESULTS: NO GROWTH — SIGNIFICANT CHANGE UP
DIFF PNL FLD: ABNORMAL
EOSINOPHIL # BLD AUTO: 0.3 K/UL — SIGNIFICANT CHANGE UP (ref 0–0.5)
EOSINOPHIL NFR BLD AUTO: 3.8 % — SIGNIFICANT CHANGE UP (ref 0–6)
GAS PNL BLDA: SIGNIFICANT CHANGE UP
GLUCOSE BLDC GLUCOMTR-MCNC: 144 MG/DL — HIGH (ref 70–99)
GLUCOSE BLDC GLUCOMTR-MCNC: 154 MG/DL — HIGH (ref 70–99)
GLUCOSE BLDC GLUCOMTR-MCNC: 180 MG/DL — HIGH (ref 70–99)
GLUCOSE BLDC GLUCOMTR-MCNC: 200 MG/DL — HIGH (ref 70–99)
GLUCOSE SERPL-MCNC: 176 MG/DL — HIGH (ref 70–99)
GLUCOSE UR QL: ABNORMAL
HCT VFR BLD CALC: 35.8 % — LOW (ref 39–50)
HGB BLD-MCNC: 12.8 G/DL — LOW (ref 13–17)
INR BLD: 1.55 RATIO — HIGH (ref 0.88–1.16)
KETONES UR-MCNC: NEGATIVE — SIGNIFICANT CHANGE UP
LEUKOCYTE ESTERASE UR-ACNC: NEGATIVE — SIGNIFICANT CHANGE UP
LYMPHOCYTES # BLD AUTO: 0.9 K/UL — LOW (ref 1–3.3)
LYMPHOCYTES # BLD AUTO: 9.9 % — LOW (ref 13–44)
MAGNESIUM SERPL-MCNC: 1.8 MG/DL — SIGNIFICANT CHANGE UP (ref 1.6–2.6)
MCHC RBC-ENTMCNC: 35.7 GM/DL — SIGNIFICANT CHANGE UP (ref 32–36)
MCHC RBC-ENTMCNC: 38.8 PG — HIGH (ref 27–34)
MCV RBC AUTO: 109 FL — HIGH (ref 80–100)
MONOCYTES # BLD AUTO: 0.6 K/UL — SIGNIFICANT CHANGE UP (ref 0–0.9)
MONOCYTES NFR BLD AUTO: 7.2 % — SIGNIFICANT CHANGE UP (ref 2–14)
NEUTROPHILS # BLD AUTO: 6.2 K/UL — SIGNIFICANT CHANGE UP (ref 1.8–7.4)
NEUTROPHILS NFR BLD AUTO: 72.1 % — SIGNIFICANT CHANGE UP (ref 43–77)
NITRITE UR-MCNC: NEGATIVE — SIGNIFICANT CHANGE UP
PH UR: 7.5 — SIGNIFICANT CHANGE UP (ref 5–8)
PHOSPHATE SERPL-MCNC: 3.3 MG/DL — SIGNIFICANT CHANGE UP (ref 2.5–4.5)
PLATELET # BLD AUTO: 150 K/UL — SIGNIFICANT CHANGE UP (ref 150–400)
POTASSIUM SERPL-MCNC: 3.8 MMOL/L — SIGNIFICANT CHANGE UP (ref 3.5–5.3)
POTASSIUM SERPL-SCNC: 3.8 MMOL/L — SIGNIFICANT CHANGE UP (ref 3.5–5.3)
PROT SERPL-MCNC: 6 G/DL — SIGNIFICANT CHANGE UP (ref 6–8.3)
PROT UR-MCNC: ABNORMAL
PROTHROM AB SERPL-ACNC: 17.9 SEC — HIGH (ref 10–12.9)
RBC # BLD: 3.3 M/UL — LOW (ref 4.2–5.8)
RBC # FLD: 14.5 % — SIGNIFICANT CHANGE UP (ref 10.3–14.5)
SODIUM SERPL-SCNC: 131 MMOL/L — LOW (ref 135–145)
SP GR SPEC: 1.01 — LOW (ref 1.01–1.02)
SPECIMEN SOURCE: SIGNIFICANT CHANGE UP
UROBILINOGEN FLD QL: NEGATIVE — SIGNIFICANT CHANGE UP
WBC # BLD: 7 K/UL — SIGNIFICANT CHANGE UP (ref 3.8–10.5)
WBC # FLD AUTO: 7 K/UL — SIGNIFICANT CHANGE UP (ref 3.8–10.5)

## 2019-09-10 PROCEDURE — 99222 1ST HOSP IP/OBS MODERATE 55: CPT

## 2019-09-10 PROCEDURE — 74018 RADEX ABDOMEN 1 VIEW: CPT | Mod: 26

## 2019-09-10 PROCEDURE — 71045 X-RAY EXAM CHEST 1 VIEW: CPT | Mod: 26

## 2019-09-10 PROCEDURE — 95816 EEG AWAKE AND DROWSY: CPT | Mod: 26

## 2019-09-10 PROCEDURE — 62270 DX LMBR SPI PNXR: CPT

## 2019-09-10 RX ORDER — INSULIN ASPART 100 [IU]/ML
6 INJECTION, SOLUTION SUBCUTANEOUS
Qty: 0 | Refills: 0 | DISCHARGE

## 2019-09-10 RX ORDER — DEXTROSE 50 % IN WATER 50 %
25 SYRINGE (ML) INTRAVENOUS ONCE
Refills: 0 | Status: DISCONTINUED | OUTPATIENT
Start: 2019-09-10 | End: 2019-09-12

## 2019-09-10 RX ORDER — PANTOPRAZOLE SODIUM 20 MG/1
40 TABLET, DELAYED RELEASE ORAL DAILY
Refills: 0 | Status: DISCONTINUED | OUTPATIENT
Start: 2019-09-10 | End: 2019-09-12

## 2019-09-10 RX ORDER — DEXMEDETOMIDINE HYDROCHLORIDE IN 0.9% SODIUM CHLORIDE 4 UG/ML
0.2 INJECTION INTRAVENOUS
Qty: 200 | Refills: 0 | Status: DISCONTINUED | OUTPATIENT
Start: 2019-09-10 | End: 2019-09-10

## 2019-09-10 RX ORDER — INSULIN GLARGINE 100 [IU]/ML
12 INJECTION, SOLUTION SUBCUTANEOUS
Qty: 0 | Refills: 0 | DISCHARGE

## 2019-09-10 RX ORDER — HEPARIN SODIUM 5000 [USP'U]/ML
5000 INJECTION INTRAVENOUS; SUBCUTANEOUS EVERY 8 HOURS
Refills: 0 | Status: DISCONTINUED | OUTPATIENT
Start: 2019-09-10 | End: 2019-09-12

## 2019-09-10 RX ORDER — PYRIDOXINE HCL (VITAMIN B6) 100 MG
100 TABLET ORAL DAILY
Refills: 0 | Status: DISCONTINUED | OUTPATIENT
Start: 2019-09-10 | End: 2019-09-18

## 2019-09-10 RX ORDER — CHLORHEXIDINE GLUCONATE 213 G/1000ML
1 SOLUTION TOPICAL
Refills: 0 | Status: DISCONTINUED | OUTPATIENT
Start: 2019-09-10 | End: 2019-09-12

## 2019-09-10 RX ORDER — MULTIVIT-MIN/FERROUS GLUCONATE 9 MG/15 ML
1 LIQUID (ML) ORAL DAILY
Refills: 0 | Status: DISCONTINUED | OUTPATIENT
Start: 2019-09-10 | End: 2019-09-18

## 2019-09-10 RX ORDER — INSULIN LISPRO 100/ML
VIAL (ML) SUBCUTANEOUS EVERY 6 HOURS
Refills: 0 | Status: DISCONTINUED | OUTPATIENT
Start: 2019-09-10 | End: 2019-09-12

## 2019-09-10 RX ORDER — LEVOTHYROXINE SODIUM 125 MCG
137 TABLET ORAL DAILY
Refills: 0 | Status: DISCONTINUED | OUTPATIENT
Start: 2019-09-10 | End: 2019-09-11

## 2019-09-10 RX ORDER — HYDRALAZINE HCL 50 MG
10 TABLET ORAL ONCE
Refills: 0 | Status: COMPLETED | OUTPATIENT
Start: 2019-09-10 | End: 2019-09-10

## 2019-09-10 RX ORDER — INSULIN GLARGINE 100 [IU]/ML
14 INJECTION, SOLUTION SUBCUTANEOUS EVERY MORNING
Refills: 0 | Status: DISCONTINUED | OUTPATIENT
Start: 2019-09-10 | End: 2019-09-11

## 2019-09-10 RX ORDER — INSULIN LISPRO 100/ML
VIAL (ML) SUBCUTANEOUS
Refills: 0 | Status: DISCONTINUED | OUTPATIENT
Start: 2019-09-10 | End: 2019-09-10

## 2019-09-10 RX ORDER — PANTOPRAZOLE SODIUM 20 MG/1
40 TABLET, DELAYED RELEASE ORAL ONCE
Refills: 0 | Status: COMPLETED | OUTPATIENT
Start: 2019-09-10 | End: 2019-09-10

## 2019-09-10 RX ORDER — SODIUM CHLORIDE 9 MG/ML
1000 INJECTION, SOLUTION INTRAVENOUS
Refills: 0 | Status: DISCONTINUED | OUTPATIENT
Start: 2019-09-10 | End: 2019-09-12

## 2019-09-10 RX ORDER — DEXTROSE 50 % IN WATER 50 %
12.5 SYRINGE (ML) INTRAVENOUS ONCE
Refills: 0 | Status: DISCONTINUED | OUTPATIENT
Start: 2019-09-10 | End: 2019-09-12

## 2019-09-10 RX ORDER — GLUCAGON INJECTION, SOLUTION 0.5 MG/.1ML
1 INJECTION, SOLUTION SUBCUTANEOUS ONCE
Refills: 0 | Status: DISCONTINUED | OUTPATIENT
Start: 2019-09-10 | End: 2019-09-12

## 2019-09-10 RX ORDER — DEXTROSE 50 % IN WATER 50 %
15 SYRINGE (ML) INTRAVENOUS ONCE
Refills: 0 | Status: DISCONTINUED | OUTPATIENT
Start: 2019-09-10 | End: 2019-09-12

## 2019-09-10 RX ORDER — HEPARIN SODIUM 5000 [USP'U]/ML
5000 INJECTION INTRAVENOUS; SUBCUTANEOUS EVERY 12 HOURS
Refills: 0 | Status: DISCONTINUED | OUTPATIENT
Start: 2019-09-10 | End: 2019-09-10

## 2019-09-10 RX ADMIN — Medication 100 MILLIGRAM(S): at 11:42

## 2019-09-10 RX ADMIN — Medication 137 MICROGRAM(S): at 08:37

## 2019-09-10 RX ADMIN — Medication 1 TABLET(S): at 11:42

## 2019-09-10 RX ADMIN — HEPARIN SODIUM 5000 UNIT(S): 5000 INJECTION INTRAVENOUS; SUBCUTANEOUS at 05:56

## 2019-09-10 RX ADMIN — Medication 10 MILLIGRAM(S): at 22:09

## 2019-09-10 RX ADMIN — DEXMEDETOMIDINE HYDROCHLORIDE IN 0.9% SODIUM CHLORIDE 3.75 MICROGRAM(S)/KG/HR: 4 INJECTION INTRAVENOUS at 01:02

## 2019-09-10 RX ADMIN — CHLORHEXIDINE GLUCONATE 15 MILLILITER(S): 213 SOLUTION TOPICAL at 05:56

## 2019-09-10 RX ADMIN — CHLORHEXIDINE GLUCONATE 15 MILLILITER(S): 213 SOLUTION TOPICAL at 17:57

## 2019-09-10 RX ADMIN — Medication 20 MILLIGRAM(S): at 11:42

## 2019-09-10 RX ADMIN — CHLORHEXIDINE GLUCONATE 1 APPLICATION(S): 213 SOLUTION TOPICAL at 05:56

## 2019-09-10 RX ADMIN — HEPARIN SODIUM 5000 UNIT(S): 5000 INJECTION INTRAVENOUS; SUBCUTANEOUS at 21:19

## 2019-09-10 RX ADMIN — PANTOPRAZOLE SODIUM 40 MILLIGRAM(S): 20 TABLET, DELAYED RELEASE ORAL at 11:44

## 2019-09-10 RX ADMIN — PROPOFOL 4.2 MICROGRAM(S)/KG/MIN: 10 INJECTION, EMULSION INTRAVENOUS at 08:38

## 2019-09-10 NOTE — CONSULT NOTE ADULT - ASSESSMENT
79 year old male with PMH of colon CA on chemotherapy at [facility] [place] and immunotherapy with Xeloda, neuropathy, HTN, A-fib s/p ablation, T2DM on insulin, hypothyroidism, multiple episodes of hypoglycemia, acromegaly on monthly Octreotide shot, was BIBEMS for unresponsiveness. As per wife, she spoke to him last time before he decided to take nap in the evening, he c/o being tired (not new c/o since he was on chemo). Wife woke him up at 8 pm for dinner, he responded that he has to use restroom. Wife noticed him walking to the room. when wife noticed patient did not come back, she went into the room and noticed that he was in curling position in the bed and urinated on himself. He was not responding to voice, she realized that this could be from hypoglycemia (similar episodes recently too). When EMS arrived, blood glucose was 265, in the ED it was 215 and then dropped to 90 and then 80. In the ED, He was slightly more alert and fighting staff with non-purposeful fashion, at that point he was intubated for the protection of his airway.   Cancer history : Diagnosed with colon cancer about 20 years ago, had liver mets 3 years later, lungs mets in 2010 (underwent resection of lobe), then had point radiation for other lung, and has been on/off multiple chemotherapy throughout. He was found to have increase metastatic lessions in the lungs and hence started on Avastin and Xeloda recently.       Impression    Hypoglycemia (As history of multiple episodes in the past and evident by low blood sugar in the ED) vs seizure (possible brain mets from lungs and urinated on himself)   not suspicious of infectious etiology as no neck stiffness noted, no fever, no headache as per wife    Plan     routine EEG - after patient is off sedation for few hours strictly    MRI brain with and without contrast

## 2019-09-10 NOTE — H&P ADULT - NSICDXPASTMEDICALHX_GEN_ALL_CORE_FT
PAST MEDICAL HISTORY:  Colon cancer     Hypertension     Hypothyroidism     Neuropathy     Type 2 diabetes mellitus PAST MEDICAL HISTORY:  Colon cancer     Hard of hearing     Hypertension     Hypothyroidism     Neuropathy     Type 2 diabetes mellitus

## 2019-09-10 NOTE — CONSULT NOTE ADULT - SUBJECTIVE AND OBJECTIVE BOX
history obtained from wife as patient is intubated     HPI:    79 year old male with PMH of colon CA [place] [facility] [place] [facility] [place] Xeloda, neuropathy, HTN, A-fib s/p ablation, T2DM on insulin, hypothyroidism, multiple episodes of hypoglycemia, acromegaly on monthly Octreotide shot, was BIBEMS for unresponsiveness.     As per wife, she spoke to him last time before he decided to take nap in the evening, he c/o being tired (not new c/o since he was on chemo). Wife woke him up at 8 pm for dinner, he responded that he has to use restroom. Wife noticed him walking to the room. when wife noticed patient did not come back, she went into the room and noticed that he was in curling position in the bed and urinated on himself. He was not responding to voice, she realized that this could be from hypoglycemia (similar episodes recently too). When EMS arrived, blood glucose was 265, in the ED it was 215 and then dropped to 90 and then 80. In the ED, He was slightly more alert and fighting staff with non-purposeful fashion, at that point he was intubated for the protection of his airway.       Cancer history : Diagnosed with colon cancer about 20 years ago, had liver mets 3 years later, lungs mets in 2010 (underwent resection of lobe), then had point radiation for other lung, and has been on/off multiple chemotherapy throughout. He was found to have increase metastatic lessions in the lungs and hence started on Avastin and Xeloda recently.     MEDICATIONS  (STANDING):  chlorhexidine 0.12% Liquid 15 milliLiter(s) Oral Mucosa every 12 hours  dexmedetomidine Infusion 0.2 MICROgram(s)/kG/Hr (3.75 mL/Hr) IV Continuous <Continuous>  propofol Infusion 10 MICROgram(s)/kG/Min (4.2 mL/Hr) IV Continuous <Continuous>    MEDICATIONS  (PRN):      PAST MEDICAL & SURGICAL HISTORY:  Neuropathy  Hypothyroidism  Type 2 diabetes mellitus  Hypertension  Colon cancer  History of bowel resection  History of cholecystectomy      FAMILY HISTORY:  FH: CVA (cerebrovascular accident)  FH: CHF (congestive heart failure)  FH: HTN (hypertension)      Allergies    No Known Allergies    Intolerances          SHx - No smoking, No ETOH, No drug abuse      Review of Systems:  CONSTITUTIONAL:  No weight loss, fever, chills, weakness or fatigue.  HEENT:  Eyes:  No visual loss, blurred vision, double vision or yellow sclerae. Ears, Nose, Throat:  No hearing loss, sneezing, congestion, runny nose or sore throat.  SKIN:  No rash or itching.  CARDIOVASCULAR:  No chest pain, chest pressure or chest discomfort. No palpitations or edema.  RESPIRATORY:  No shortness of breath, cough or sputum.  GASTROINTESTINAL:  No anorexia, nausea, vomiting or diarrhea. No abdominal pain or blood.  GENITOURINARY:  NO Burning on urination.   NEUROLOGICAL: See HPI  MUSCULOSKELETAL:  No muscle, back pain, joint pain or stiffness.  HEMATOLOGIC:  No anemia, bleeding or bruising.  LYMPHATICS:  No enlarged nodes. No history of splenectomy.  PSYCHIATRIC:  No history of depression or anxiety.  ENDOCRINOLOGIC:  No reports of sweating, cold or heat intolerance. No polyuria or polydipsia.  ALLERGIES:  No history of asthma, hives, eczema or rhinitis.        Vital Signs Last 24 Hrs  T(C): --  T(F): --  HR: 54 (10 Sep 2019 00:39) (54 - 70)  BP: 158/88 (10 Sep 2019 00:39) (75/50 - 193/105)  BP(mean): 83 (09 Sep 2019 23:25) (66 - 83)  RR: 18 (10 Sep 2019 00:39) (14 - 28)  SpO2: 99% (10 Sep 2019 00:39) (99% - 100%)        Intubated on sedation   PERRL, oculocephalic positive, corneal positive, face symmetric   withdraw to painful stimuli in all four limbs     Other:    09-09    135  |  97  |  19  ----------------------------<  80  4.3   |  27  |  1.14    Ca    9.6      09 Sep 2019 22:15    TPro  7.4  /  Alb  3.5  /  TBili  0.9  /  DBili  x   /  AST  25  /  ALT  17  /  AlkPhos  125<H>  09-09 09-09    135  |  97  |  19  ----------------------------<  80  4.3   |  27  |  1.14    Ca    9.6      09 Sep 2019 22:15    TPro  7.4  /  Alb  3.5  /  TBili  0.9  /  DBili  x   /  AST  25  /  ALT  17  /  AlkPhos  125<H>  09-09                          15.1   8.6   )-----------( 205      ( 09 Sep 2019 22:15 )             42.8       Radiology    CT  MRI  EKG:  tele:  TTE:  EEG: history obtained from wife as patient is intubated     HPI:    79 year old male with PMH of colon CA on chemotherapy at [facility] [place] and immunotherapy with Xeloda, neuropathy, HTN, A-fib s/p ablation, T2DM on insulin, hypothyroidism, multiple episodes of hypoglycemia, acromegaly on monthly Octreotide shot, was BIBEMS for unresponsiveness.     As per wife, she spoke to him last time before he decided to take nap in the evening, he c/o being tired (not new c/o since he was on chemo). Wife woke him up at 8 pm for dinner, he responded that he has to use restroom. Wife noticed him walking to the room. when wife noticed patient did not come back, she went into the room and noticed that he was in curling position in the bed and urinated on himself. He was not responding to voice, she realized that this could be from hypoglycemia (similar episodes recently too). When EMS arrived, blood glucose was 265, in the ED it was 215 and then dropped to 90 and then 80. In the ED, He was slightly more alert and fighting staff with non-purposeful fashion, at that point he was intubated for the protection of his airway.       Cancer history : Diagnosed with colon cancer about 20 years ago, had liver mets 3 years later, lungs mets in 2010 (underwent resection of lobe), then had point radiation for other lung, and has been on/off multiple chemotherapy throughout. He was found to have increase metastatic lessions in the lungs and hence started on Avastin and Xeloda recently.     MEDICATIONS  (STANDING):  chlorhexidine 0.12% Liquid 15 milliLiter(s) Oral Mucosa every 12 hours  dexmedetomidine Infusion 0.2 MICROgram(s)/kG/Hr (3.75 mL/Hr) IV Continuous <Continuous>  propofol Infusion 10 MICROgram(s)/kG/Min (4.2 mL/Hr) IV Continuous <Continuous>    MEDICATIONS  (PRN):      PAST MEDICAL & SURGICAL HISTORY:  Neuropathy  Hypothyroidism  Type 2 diabetes mellitus  Hypertension  Colon cancer  History of bowel resection  History of cholecystectomy      FAMILY HISTORY:  FH: CVA (cerebrovascular accident)  FH: CHF (congestive heart failure)  FH: HTN (hypertension)      Allergies    No Known Allergies    Intolerances          SHx - No smoking, No ETOH, No drug abuse      Review of Systems:  CONSTITUTIONAL:  No weight loss, fever, chills, weakness or fatigue.  HEENT:  Eyes:  No visual loss, blurred vision, double vision or yellow sclerae. Ears, Nose, Throat:  No hearing loss, sneezing, congestion, runny nose or sore throat.  SKIN:  No rash or itching.  CARDIOVASCULAR:  No chest pain, chest pressure or chest discomfort. No palpitations or edema.  RESPIRATORY:  No shortness of breath, cough or sputum.  GASTROINTESTINAL:  No anorexia, nausea, vomiting or diarrhea. No abdominal pain or blood.  GENITOURINARY:  NO Burning on urination.   NEUROLOGICAL: See HPI  MUSCULOSKELETAL:  No muscle, back pain, joint pain or stiffness.  HEMATOLOGIC:  No anemia, bleeding or bruising.  LYMPHATICS:  No enlarged nodes. No history of splenectomy.  PSYCHIATRIC:  No history of depression or anxiety.  ENDOCRINOLOGIC:  No reports of sweating, cold or heat intolerance. No polyuria or polydipsia.  ALLERGIES:  No history of asthma, hives, eczema or rhinitis.        Vital Signs Last 24 Hrs  T(C): --  T(F): --  HR: 54 (10 Sep 2019 00:39) (54 - 70)  BP: 158/88 (10 Sep 2019 00:39) (75/50 - 193/105)  BP(mean): 83 (09 Sep 2019 23:25) (66 - 83)  RR: 18 (10 Sep 2019 00:39) (14 - 28)  SpO2: 99% (10 Sep 2019 00:39) (99% - 100%)        Intubated on sedation   PERRL, oculocephalic positive, corneal positive, face symmetric   withdraw to painful stimuli in all four limbs, upgoing toe on RLE.     Other:    09-09    135  |  97  |  19  ----------------------------<  80  4.3   |  27  |  1.14    Ca    9.6      09 Sep 2019 22:15    TPro  7.4  /  Alb  3.5  /  TBili  0.9  /  DBili  x   /  AST  25  /  ALT  17  /  AlkPhos  125<H>  09-09 09-09    135  |  97  |  19  ----------------------------<  80  4.3   |  27  |  1.14    Ca    9.6      09 Sep 2019 22:15    TPro  7.4  /  Alb  3.5  /  TBili  0.9  /  DBili  x   /  AST  25  /  ALT  17  /  AlkPhos  125<H>  09-09                          15.1   8.6   )-----------( 205      ( 09 Sep 2019 22:15 )             42.8       Radiology    CT  MRI  EKG:  tele:  TTE:  EEG:

## 2019-09-10 NOTE — PROGRESS NOTE ADULT - SUBJECTIVE AND OBJECTIVE BOX
INTERVAL HPI/OVERNIGHT EVENTS:        SUBJECTIVE: Patient seen and examined at bedside.     CONSTITUTIONAL: No weakness, fevers or chills  EYES/ENT: No visual changes;  No vertigo or throat pain   NECK: No pain or stiffness  RESPIRATORY: No cough, wheezing, hemoptysis; No shortness of breath  CARDIOVASCULAR: No chest pain or palpitations  GASTROINTESTINAL: No abdominal or epigastric pain. No nausea, vomiting, or hematemesis; No diarrhea or constipation. No melena or hematochezia.  GENITOURINARY: No dysuria, frequency or hematuria  NEUROLOGICAL: No numbness or weakness  SKIN: No itching, rashes    OBJECTIVE:    VITAL SIGNS:  ICU Vital Signs Last 24 Hrs  T(C): 36.9 (10 Sep 2019 08:00), Max: 36.9 (10 Sep 2019 02:54)  T(F): 98.5 (10 Sep 2019 08:00), Max: 98.5 (10 Sep 2019 08:00)  HR: 58 (10 Sep 2019 11:44) (50 - 70)  BP: 102/58 (10 Sep 2019 09:00) (75/50 - 193/105)  BP(mean): 73 (10 Sep 2019 09:00) (60 - 105)  ABP: --  ABP(mean): --  RR: 16 (10 Sep 2019 09:00) (14 - 97)  SpO2: 100% (10 Sep 2019 11:44) (79% - 100%)    Mode: AC/ CMV (Assist Control/ Continuous Mandatory Ventilation), RR (machine): 16, TV (machine): 500, FiO2: 30, PEEP: 5, ITime: 1, MAP: 9, PIP: 21     @ 07:01  -  09-10 @ 07:00  --------------------------------------------------------  IN: 72.4 mL / OUT: 50 mL / NET: 22.4 mL    09-10 @ 07:01  -  09-10 @ 13:31  --------------------------------------------------------  IN: 81.4 mL / OUT: 0 mL / NET: 81.4 mL      CAPILLARY BLOOD GLUCOSE      POCT Blood Glucose.: 154 mg/dL (10 Sep 2019 11:50)      PHYSICAL EXAM:    General: NAD  HEENT: NC/AT; PERRL, clear conjunctiva  Neck: supple  Respiratory: CTA b/l  Cardiovascular: +S1/S2; RRR  Abdomen: soft, NT/ND; +BS x4  Extremities: WWP, 2+ peripheral pulses b/l; no LE edema  Skin: normal color and turgor; no rash  Neurological:    MEDICATIONS:  MEDICATIONS  (STANDING):  chlorhexidine 0.12% Liquid 15 milliLiter(s) Oral Mucosa every 12 hours  chlorhexidine 4% Liquid 1 Application(s) Topical <User Schedule>  dexmedetomidine Infusion 0.2 MICROgram(s)/kG/Hr (3.75 mL/Hr) IV Continuous <Continuous>  dextrose 5%. 1000 milliLiter(s) (50 mL/Hr) IV Continuous <Continuous>  dextrose 50% Injectable 12.5 Gram(s) IV Push once  dextrose 50% Injectable 25 Gram(s) IV Push once  dextrose 50% Injectable 25 Gram(s) IV Push once  levothyroxine 137 MICROGram(s) Oral daily  multivitamin/minerals 1 Tablet(s) Oral daily  PARoxetine 20 milliGRAM(s) Oral daily  propofol Infusion 10 MICROgram(s)/kG/Min (4.2 mL/Hr) IV Continuous <Continuous>  pyridoxine 100 milliGRAM(s) Oral daily    MEDICATIONS  (PRN):  dextrose 40% Gel 15 Gram(s) Oral once PRN Blood Glucose LESS THAN 70 milliGRAM(s)/deciliter  glucagon  Injectable 1 milliGRAM(s) IntraMuscular once PRN Glucose LESS THAN 70 milligrams/deciliter      ALLERGIES:  Allergies    No Known Allergies    Intolerances        LABS:                        12.8   7.0   )-----------( 150      ( 10 Sep 2019 04:34 )             35.8     09-10    131<L>  |  99  |  19  ----------------------------<  176<H>  3.8   |  22  |  1.01    Ca    8.5      10 Sep 2019 04:34  Phos  3.3     09-10  Mg     1.8     09-10    TPro  6.0  /  Alb  2.8<L>  /  TBili  1.0  /  DBili  x   /  AST  23  /  ALT  13  /  AlkPhos  99  -10    PT/INR - ( 10 Sep 2019 05:13 )   PT: 17.9 sec;   INR: 1.55 ratio         PTT - ( 10 Sep 2019 05:13 )  PTT:29.9 sec  Urinalysis Basic - ( 09 Sep 2019 22:56 )    Color: Light Yellow / Appearance: Clear / S.008 / pH: x  Gluc: x / Ketone: Negative  / Bili: Negative / Urobili: Negative   Blood: x / Protein: 30 mg/dL / Nitrite: Negative   Leuk Esterase: Negative / RBC: 6 /hpf / WBC 0 /HPF   Sq Epi: x / Non Sq Epi: 0 /hpf / Bacteria: Negative        RADIOLOGY & ADDITIONAL TESTS: Reviewed. INTERVAL HPI/OVERNIGHT EVENTS:      SUBJECTIVE: Patient seen and examined at bedside. Patient on vent non-verbal        OBJECTIVE:    VITAL SIGNS:  ICU Vital Signs Last 24 Hrs  T(C): 36.9 (10 Sep 2019 08:00), Max: 36.9 (10 Sep 2019 02:54)  T(F): 98.5 (10 Sep 2019 08:00), Max: 98.5 (10 Sep 2019 08:00)  HR: 58 (10 Sep 2019 11:44) (50 - 70)  BP: 102/58 (10 Sep 2019 09:00) (75/50 - 193/105)  BP(mean): 73 (10 Sep 2019 09:00) (60 - 105)  ABP: --  ABP(mean): --  RR: 16 (10 Sep 2019 09:00) (14 - 97)  SpO2: 100% (10 Sep 2019 11:44) (79% - 100%)    Mode: AC/ CMV (Assist Control/ Continuous Mandatory Ventilation), RR (machine): 16, TV (machine): 500, FiO2: 30, PEEP: 5, ITime: 1, MAP: 9, PIP: 21     @ 07:01  -  09-10 @ 07:00  --------------------------------------------------------  IN: 72.4 mL / OUT: 50 mL / NET: 22.4 mL    09-10 @ 07:01  -  09-10 @ 13:31  --------------------------------------------------------  IN: 81.4 mL / OUT: 0 mL / NET: 81.4 mL      CAPILLARY BLOOD GLUCOSE      POCT Blood Glucose.: 154 mg/dL (10 Sep 2019 11:50)      PHYSICAL EXAM:    General: sedated on vent  HEENT: NC/AT; PERRL, clear conjunctiva  Neck: supple  Respiratory: CTA b/l  Cardiovascular: +S1/S2; RRR  Abdomen: soft, NT/ND; +BS x4m, titanium port   Extremities: WWP, 2+ peripheral pulses b/l; no LE edema  Skin: normal color and turgor; no rash  Neurological:    MEDICATIONS:  MEDICATIONS  (STANDING):  chlorhexidine 0.12% Liquid 15 milliLiter(s) Oral Mucosa every 12 hours  chlorhexidine 4% Liquid 1 Application(s) Topical <User Schedule>  dexmedetomidine Infusion 0.2 MICROgram(s)/kG/Hr (3.75 mL/Hr) IV Continuous <Continuous>  dextrose 5%. 1000 milliLiter(s) (50 mL/Hr) IV Continuous <Continuous>  dextrose 50% Injectable 12.5 Gram(s) IV Push once  dextrose 50% Injectable 25 Gram(s) IV Push once  dextrose 50% Injectable 25 Gram(s) IV Push once  levothyroxine 137 MICROGram(s) Oral daily  multivitamin/minerals 1 Tablet(s) Oral daily  PARoxetine 20 milliGRAM(s) Oral daily  propofol Infusion 10 MICROgram(s)/kG/Min (4.2 mL/Hr) IV Continuous <Continuous>  pyridoxine 100 milliGRAM(s) Oral daily    MEDICATIONS  (PRN):  dextrose 40% Gel 15 Gram(s) Oral once PRN Blood Glucose LESS THAN 70 milliGRAM(s)/deciliter  glucagon  Injectable 1 milliGRAM(s) IntraMuscular once PRN Glucose LESS THAN 70 milligrams/deciliter      ALLERGIES:  Allergies    No Known Allergies    Intolerances        LABS:                        12.8   7.0   )-----------( 150      ( 10 Sep 2019 04:34 )             35.8     09-10    131<L>  |  99  |  19  ----------------------------<  176<H>  3.8   |  22  |  1.01    Ca    8.5      10 Sep 2019 04:34  Phos  3.3     09-10  Mg     1.8     09-10    TPro  6.0  /  Alb  2.8<L>  /  TBili  1.0  /  DBili  x   /  AST  23  /  ALT  13  /  AlkPhos  99  09-10    PT/INR - ( 10 Sep 2019 05:13 )   PT: 17.9 sec;   INR: 1.55 ratio         PTT - ( 10 Sep 2019 05:13 )  PTT:29.9 sec  Urinalysis Basic - ( 09 Sep 2019 22:56 )    Color: Light Yellow / Appearance: Clear / S.008 / pH: x  Gluc: x / Ketone: Negative  / Bili: Negative / Urobili: Negative   Blood: x / Protein: 30 mg/dL / Nitrite: Negative   Leuk Esterase: Negative / RBC: 6 /hpf / WBC 0 /HPF   Sq Epi: x / Non Sq Epi: 0 /hpf / Bacteria: Negative        RADIOLOGY & ADDITIONAL TESTS: Reviewed.

## 2019-09-10 NOTE — PROGRESS NOTE ADULT - ASSESSMENT
79M pmh metastatic colon Ca, HTN, DM w/ frequent ED visits for hypoglycemia, hypothyroidism here for AMS w/ intubation for agitation and to allow CT head. Ddx for encephalopathy includes seizures, metastatic disease, hypoglycemia, sepsis (no overt source).    Neuro:  - sedated on propofol, precedex  - CT head and CTA negative for acute process  - neuro consulted, recommend routine EEG - after patient is off sedation for few hours strictly and MRI brain w/ and w/o contrast    Cardiovascular:  - HTN- on home losartan, carvedilol, amlodipine, furosemide  - holding meds 2/2 soft BPs, restart as necessary    Pulmonary:  - intubated, stable  - CXR showed in correct position    GI:  - dysphagia w/ 2x heimlich maneuvers in past week  - OG tube placed, CXR showed in correct position  - will start nutrition  - speech and swallow eval    Renal/Electrolytes:  - NTD    Endocrine:  DM II  - frequent episodes of hypoglycemia w/ prior admission, follows outpatient w/ endocrine  - on lantus 14U, SSI at home  - monitor POC glucose Q6H  - on low SSI  Hypothyroidism  - TSH ordered to r/o hypothyroidism cause of AMS    :  - NTD    ID:  - BCx, Ucx pending    Heme:  - metastatic colon cancer so far stable, treated w/ xeloda, IV chemo infusion    Ppx:  - DVT: heparin subQ 79M pmh metastatic colon Ca, HTN, DM w/ frequent ED visits for hypoglycemia, hypothyroidism here for AMS w/ intubation for agitation and to allow CT head. Ddx for encephalopathy includes seizures, metastatic disease, hypoglycemia, sepsis (no overt source).    Neuro:  - sedated on propofol, precedex  - CT head and CTA negative for acute process  - neuro consulted, recommend routine EEG   -EEG shows moderate to severe diffuse or multifocal cerebral dysfunction. There were no epileptiform abnormalities recorded.    -MRI brain w/ and w/o contrast ordered.     Cardiovascular:  - HTN- on home losartan, carvedilol, amlodipine, furosemide  - holding meds 2/2 soft BPs, restart as necessary    Pulmonary:  - intubated, stable  - CXR showed in correct position    GI:  - dysphagia w/ 2x heimlich maneuvers in past week  - OG tube placed, CXR showed in correct position  - will start nutrition  - speech and swallow eval    Renal/Electrolytes:  - NTD    Endocrine:  DM II  - frequent episodes of hypoglycemia w/ prior admission, follows outpatient w/ endocrine  - on lantus 14U, SSI at home  - monitor POC glucose Q6H  - on low SSI  Hypothyroidism  - TSH ordered to r/o hypothyroidism cause of AMS    :  - NTD    ID:  - BCx, Ucx pending    Heme:  - metastatic colon cancer so far stable, treated w/ xeloda, IV chemo infusion    Ppx:  - DVT: heparin subQ

## 2019-09-10 NOTE — EEG REPORT - NS EEG TEXT BOX
ANNA MONTES DE OCA [id number] (1940)M  Admitting MD: Dr. Verónica Prado    Study Date: 09-10-19    ROUTINE EEG    Technical Information:			  		  Placement and Labeling of Electrodes:  The EEG was performed utilizing 20 channels referential EEG connections (coronal over temporal over parasagittal montage) using all standard 10-20 electrode placements with EKG.  Recording was at a sampling rate of 256 samples per second per channel.  Time synchronized digital video recording was done simultaneously with EEG recording.  A low light infrared camera was used for low light recording.  Matthew and seizure detection algorithms were utilized.    CSA Technical Component:  Quantitative EEG analysis using a separate Compressed Spectral Array (CSA) software package was conducted in real-time and run at bedside after set up by the technician, digitally displaying the power of electrographic frequencies included in the 1-30Hz band using a graded color map.  This data was reviewed and interpreted independently, and is reported in a separate section below.    --------------------------------------------------------------------------------------------------  History:  CC/ HPI 79 year old male with PMH of colon CA [place] [facility] [place] with Xeloda, neuropathy, T2DM on insulin, hypothyroidism, multiple episodes of hypoglycemia, acromegaly on monthly Octreotide shot, presented after being found unresponsive and urinated on himself. CT head negative for acute pathology. No reported history of seizures.    Pertient Medications  Was previously on Midazolam (given evening 9/9), Precedex, and Propofol (last given this morning)  dexmedetomidine Infusion 0.2 MICROgram(s)/kG/Hr IV Continuous <Continuous>  propofol Infusion 10 MICROgram(s)/kG/Min IV Continuous <Continuous>  pyridoxine 100 milliGRAM(s) Oral daily    --------------------------------------------------------------------------------------------------  Study Interpretation:    [[[Abbreviation Key:  PDR=alpha rhythm/posterior dominant rhythm. A-P=anterior posterior gradient.  Amplitude: ‘very low’:<20; ‘low’:20-50; ‘medium’:; ‘high’:>200uV.  Persistence for periodic/rhythmic patterns (% of epoch) ‘rare’:<1%; ‘occasional’:1-10%; ‘frequent’:10-50%; ‘abundant’:50-90%; ‘continuous’:>90%.  Persistence for sporadic discharges: ‘rare’:<1/hr; ‘occasional’:1/min-1/hr; ‘frequent’:>1/min; ‘abundant’:>1/10 sec.  GRDA=generalized rhythmic delta activity, LRDA=lateralized rhythmic delta activity, TIRDA=temporal intermittent rhythmic delta activity, FIRDA=frontal intermittent rhythmic activity. LPD=PLED=lateralized periodic discharges, GPD=generalized periodic discharges, BiPDs=BiPLEDs=bilateral independent periodic epileptiform discharges, SIRPID=stimulus induced rhythmic, periodic, or ictal appearing discharges.  Modifiers: +F=with fast component, +S=with spike component, +R=with rhythmic component.  S-B=burst suppression pattern.  Max=maximal. N1-drowsy, N2-stage II sleep, N3-slow wave sleep.  HV=hyperventilation, PS=photic stimulation]]]    FINDINGS:  The background was discontinuous, spontaneously variable and reactive.  No posteriorly dominant rhythm seen.      Background Slowing:  Generalized slowing: Generalized diffuse frontally predominant polymorphic delta slowing.   Focal slowing: none was present.    Sleep Background:  Stage II sleep transients were not recorded.    Epileptiform Activity:   No epileptiform discharges were present.    Events:  No clinical events were recorded.  No seizures were recorded.    Activation Procedures:   -Hyperventilation was not performed.    -Photic stimulation was not performed.    Artifacts:  Intermittent myogenic and movement artifacts were noted.    ECG:  The heart rate on single channel ECG at baseline was predominantly near BPM = 60-70  -----------------------------------------------------------------------------------------------------  ******** THIS NOTE IS INCOMPLETE, EEG TO BE REVIEWED BY ATTENDING  **********   EEG Classification / Summary:  Abnormal EEG study in altered patient  Moderate to severe diffuse or multifocal generalized delta slowing with frontal predominance.   -----------------------------------------------------------------------------------------------------    Clinical Impression:  Moderate to severe diffuse or multifocal cerebral dysfunction.   There were no epileptiform abnormalities recorded.      -------------------------------------------------------------------------------------------------------  Sena Nevarez MD  Neurology Resident     Dayv Piña M.D.   of Neurology, [facility] Epilepsy [place] ANNA MONTES DE OCA [id number] 79y (1940)M  Admitting MD: Dr. Verónica Prado    Study Date: 09-10-19    ROUTINE EEG    Technical Information:			  		  Placement and Labeling of Electrodes:  The EEG was performed utilizing 20 channels referential EEG connections (coronal over temporal over parasagittal montage) using all standard 10-20 electrode placements with EKG.  Recording was at a sampling rate of 256 samples per second per channel.  Time synchronized digital video recording was done simultaneously with EEG recording.  A low light infrared camera was used for low light recording.  Matthew and seizure detection algorithms were utilized.    CSA Technical Component:  Quantitative EEG analysis using a separate Compressed Spectral Array (CSA) software package was conducted in real-time and run at bedside after set up by the technician, digitally displaying the power of electrographic frequencies included in the 1-30Hz band using a graded color map.  This data was reviewed and interpreted independently, and is reported in a separate section below.    --------------------------------------------------------------------------------------------------  History:  CC/ HPI 79 year old male with PMH of colon CA on chemotherapy at [facility] [place] and immunotherapy with Xeloda, neuropathy, T2DM on insulin, hypothyroidism, multiple episodes of hypoglycemia, acromegaly on monthly Octreotide shot, presented after being found unresponsive and urinated on himself. CT head negative for acute pathology. No reported history of seizures.    Pertient Medications  Was previously on Midazolam (given evening 9/9), Precedex, and Propofol (last given this morning)  dexmedetomidine Infusion 0.2 MICROgram(s)/kG/Hr IV Continuous <Continuous>  propofol Infusion 10 MICROgram(s)/kG/Min IV Continuous <Continuous>  pyridoxine 100 milliGRAM(s) Oral daily    --------------------------------------------------------------------------------------------------  Study Interpretation:    [[[Abbreviation Key:  PDR=alpha rhythm/posterior dominant rhythm. A-P=anterior posterior gradient.  Amplitude: ‘very low’:<20; ‘low’:20-50; ‘medium’:; ‘high’:>200uV.  Persistence for periodic/rhythmic patterns (% of epoch) ‘rare’:<1%; ‘occasional’:1-10%; ‘frequent’:10-50%; ‘abundant’:50-90%; ‘continuous’:>90%.  Persistence for sporadic discharges: ‘rare’:<1/hr; ‘occasional’:1/min-1/hr; ‘frequent’:>1/min; ‘abundant’:>1/10 sec.  GRDA=generalized rhythmic delta activity, LRDA=lateralized rhythmic delta activity, TIRDA=temporal intermittent rhythmic delta activity, FIRDA=frontal intermittent rhythmic activity. LPD=PLED=lateralized periodic discharges, GPD=generalized periodic discharges, BiPDs=BiPLEDs=bilateral independent periodic epileptiform discharges, SIRPID=stimulus induced rhythmic, periodic, or ictal appearing discharges.  Modifiers: +F=with fast component, +S=with spike component, +R=with rhythmic component.  S-B=burst suppression pattern.  Max=maximal. N1-drowsy, N2-stage II sleep, N3-slow wave sleep.  HV=hyperventilation, PS=photic stimulation]]]    FINDINGS:  The background was briefly discontinuous for 1-2 seconds, spontaneously variable and reactive.  No posteriorly dominant rhythm seen.      Background Slowing:  Generalized slowing: Generalized frontally predominant semiperiodic delta slowing, near 1-1.5Hz sharply contoured with a triphasic morphology.   Generalized polymorphic delta and theta present.   Focal slowing: none was present.    Sleep Background:  Drowsiness was associated with increased discontinuity.   Stage II sleep transients were recorded with intermittent spindles and K complexes.    Epileptiform Activity:   No epileptiform discharges were present.    Events:  No clinical events were recorded.  No seizures were recorded.    Activation Procedures:   -Hyperventilation was not performed.    -Photic stimulation was not performed.    Artifacts:  Intermittent myogenic and movement artifacts were noted.    ECG:  The heart rate on single channel ECG at baseline was predominantly near BPM = 60-70  -----------------------------------------------------------------------------------------------------  EEG Classification / Summary:  Abnormal EEG study in altered patient  Severe diffuse slowing with frontal predominance at times semiperiodic with a triphasic morphology.  Intermittent discontinuity.  -----------------------------------------------------------------------------------------------------  Clinical Impression:  Severe diffuse or multifocal cerebral dysfunction.   There were no epileptiform abnormalities recorded.      -------------------------------------------------------------------------------------------------------  Sena Nevarez MD  Neurology Resident     Davy Piña M.D.   of Neurology, [facility] Epilepsy [place]

## 2019-09-10 NOTE — H&P ADULT - HISTORY OF PRESENT ILLNESS
79 yom pmh metastatic colon Ca, HTN, DM, hypothyroidism here for AMS. Patient was noted to be in USOH by wife earlier on in day. Around 7 hours prior to arrival, patient was sitting a desk and endorsed to wife "not feeling well" so he laid down. Then 3 hours prior to arrival, wife noted he was in fetal position lying on bed no responding to her shaking him. Patient has had previous episodes of hypoglycemia requiring hospitalization so wife tried to give him some sugar but did not get better so called EMS. Initially FSG per EMS 250s, first repeat in ED 99 and second 80. Patient not following any commands, combative and flailing in bed. Patient moved to critical area. Attempted 2mg versed IV without improvement. Decision made to intubate for stat head CT. CT-head revealed no hemorrhage, midline shift, or hydrocephalus. CT angio of head and neck also performed, results pending. UA negative. Blood culture, urine cultures pending. 79 yom pmh metastatic colon Ca, HTN, DM, hypothyroidism here for AMS w/ intubation for agitation. As per wife, she spoke to him last time before he decided to take nap in the evening, he c/o being tired (not new c/o since he was on chemo). Wife woke him up at 8 pm for dinner, he responded that he has to use restroom. Wife noticed him walking to the room. When wife noticed patient did not come back, she went into the room and noticed that he was in curling position in the bed and urinated on himself. He was not responding to voice, she realized that this could be from hypoglycemia (similar episodes recently too).  Patient has had previous episodes of hypoglycemia requiring hospitalization so wife tried to give him some sugar but did not get better so called EMS. Initially FSG per EMS 250s, first repeat in ED 99 and second 80. Patient not following any commands in ED, combative and flailing in bed. Patient moved to critical area. Attempted 2mg versed IV without improvement. Decision made to intubate for stat head CT. CT-head revealed no hemorrhage, midline shift, or hydrocephalus. CT angio of head and neck also performed, results pending. UA negative. Blood culture, urine cultures pending.     Per wife, patient has 79 yom pmh metastatic colon Ca, HTN, DM, hypothyroidism here for AMS w/ intubation for agitation. As per wife, she spoke to him last time before he decided to take nap in the evening, he c/o being tired (not new c/o since he was on chemo). Wife woke him up at 8 pm for dinner, he responded that he has to use restroom. Pt did not come back, she found him in bedroom in curling position in the bed and urinated on himself. He was not responding to voice, she realized that this could be from hypoglycemia (similar episodes recently too).  Patient has had previous episodes of hypoglycemia requiring ED visits and hospitalization so wife gave him glucose packets but did not get better so called EMS. Initially FSG per EMS 250s, first repeat in ED 99 and second 80. Pt was given an amp of D50 but was not following commands in ED, combative and flailing in bed. Patient moved to critical area. Attempted 2mg versed IV without improvement. Intubated for stat head CT. CT-head revealed no hemorrhage, midline shift, or hydrocephalus. CT angio of head and neck also performed, results pending. UA negative. Blood culture, urine cultures pending. Per wife, only difference this time from prior visits was agitation and not following commands. Patient does not have a history of seizures and has recently been choking on food, needed Heimlich 2x in past week. Of note, pt hard of hearing. 79M pmh metastatic colon Ca, HTN, DM, hypothyroidism here for AMS w/ intubation for agitation. As per wife, she spoke to him last time before he decided to take nap in the evening, he c/o being tired (not new c/o since he was on chemo). Wife woke him up at 8 pm for dinner, he responded that he has to use restroom. Pt did not come back, she found him in bedroom in curling position in the bed and urinated on himself. He was not responding to voice, she realized that this could be from hypoglycemia (similar episodes recently too).  Patient has had previous episodes of hypoglycemia requiring ED visits and hospitalization so wife gave him glucose packets but did not get better so called EMS. Initially FSG per EMS 250s, first repeat in ED 99 and second 80. Pt was given an amp of D50 but was not following commands in ED, combative and flailing in bed. Patient moved to critical area. Attempted 2mg versed IV without improvement. Intubated for stat head CT. CT-head revealed no hemorrhage, midline shift, or hydrocephalus. CT angio of head and neck also performed, results pending. UA negative. Blood culture, urine cultures pending. Per wife, only difference this time from prior visits was agitation and not following commands. Patient does not have a history of seizures and has recently been choking on food, needed Heimlich 2x in past week.

## 2019-09-10 NOTE — H&P ADULT - ATTENDING COMMENTS
critical care time 40 mins  Patient seen and examined.  Agree with resident note as above.  Patient with hx as noted including colon cancer with longstanding mets on xeloda who reportedly functions well in his home.  Also with hx of episodes of hypoglycemia, and recent "choking" episodes x2 requiring Heimlich.  This evening wife noted patient to be confused/somnolent, and called EMS.  On arrival to ER was possibly somnolent, or at times agitated.  ER team intubated pt to achieve control of the airway to allow CThead and other evaluations.  CTH negative, CTA negative preliminarily.  FSG show some borderline glucose values, though no overt hypoglycemia.  No reported complaints suspicious for infection.  Will bring to MICU for vent and further workup of this enecephalopathy.  Appreciate neuro recs.  Check MRI, EEG.  FSGq2h and no lantus for now.  Basic infectious workup.  If all proves benign may require LP.  Rest of plan as above and I have edited as appropriate.  Wife and daughter at bedside and updated.  FULL CODE.

## 2019-09-10 NOTE — H&P ADULT - ASSESSMENT
79M pmh metastatic colon Ca, HTN, DM, hypothyroidism here for AMS w/ intubation for agitation. Seizures vs hypoglycemia differential    Neuro:  - sedated on propofol, precedex  - CT head and CTA negative for acute process  - neuro consulted, recommend routine EEG - after patient is off sedation for few hours strictly and MRI brain w/ and w/o contrast    Cardiovascular:  - stable, NTD    Pulmonary:  - intubated, stable    GI:  - OG tube, placed, started nutrition    Renal/Electrolytes:  - NTD    Endocrine:  - on lantus 14U, SSI at home  - monitor POC glucose Q6H  - on low SSI    :  - NTD    ID:  - NTD    Heme:  - NTD    Ppx:  - DVT: heparin subQ 79M pmh metastatic colon Ca, HTN, DM w/ frequent ED visits for hypoglycemia, hypothyroidism here for AMS w/ intubation for agitation. Seizures from mets vs hypoglycemia differential    Neuro:  - sedated on propofol, precedex  - CT head and CTA negative for acute process  - neuro consulted, recommend routine EEG - after patient is off sedation for few hours strictly and MRI brain w/ and w/o contrast    Cardiovascular:  - NTD    Pulmonary:  - intubated, stable  - CXR showed in correct positiojn    GI:  - OG tube placed, CXR showed in correct position  - will start nutrition  - metastatic colon cancer so far stable, treated w/ xeloda, IV chemo infusion    Renal/Electrolytes:  - NTD    Endocrine:  DM II  - frequent episodes of hypoglycemia w/ prior admission, follows outpatient w/ endocrine  - on lantus 14U, SSI at home  - monitor POC glucose Q6H  - on low SSI    :  - NTD    ID:  - NTD    Heme:  - NTD    Ppx:  - DVT: heparin subQ 79M pmh metastatic colon Ca, HTN, DM w/ frequent ED visits for hypoglycemia, hypothyroidism here for AMS w/ intubation for agitation. Seizures from mets vs hypoglycemia differential    Neuro:  - sedated on propofol, precedex  - CT head and CTA negative for acute process  - neuro consulted, recommend routine EEG - after patient is off sedation for few hours strictly and MRI brain w/ and w/o contrast    Cardiovascular:  - HTN- on home losartan, carvedilol, amlodipine, furosemide  - holding meds, restart as necessary    Pulmonary:  - intubated, stable  - CXR showed in correct positiojn    GI:  - OG tube placed, CXR showed in correct position  - will start nutrition  - metastatic colon cancer so far stable, treated w/ xeloda, IV chemo infusion    Renal/Electrolytes:  - NTD    Endocrine:  DM II  - frequent episodes of hypoglycemia w/ prior admission, follows outpatient w/ endocrine  - on lantus 14U, SSI at home  - monitor POC glucose Q6H  - on low SSI    :  - NTD    ID:  - NTD    Heme:  - NTD    Ppx:  - DVT: heparin subQ 79M pmh metastatic colon Ca, HTN, DM w/ frequent ED visits for hypoglycemia, hypothyroidism here for AMS w/ intubation for agitation. Seizures from mets vs hypoglycemia differential    Neuro:  - sedated on propofol, precedex  - CT head and CTA negative for acute process  - neuro consulted, recommend routine EEG - after patient is off sedation for few hours strictly and MRI brain w/ and w/o contrast    Cardiovascular:  - HTN- on home losartan, carvedilol, amlodipine, furosemide  - holding meds 2/2 soft BPs, restart as necessary    Pulmonary:  - intubated, stable  - CXR showed in correct position    GI:  - OG tube placed, CXR showed in correct position  - will start nutrition  - metastatic colon cancer so far stable, treated w/ xeloda, IV chemo infusion    Renal/Electrolytes:  - NTD    Endocrine:  DM II  - frequent episodes of hypoglycemia w/ prior admission, follows outpatient w/ endocrine  - on lantus 14U, SSI at home  - monitor POC glucose Q6H  - on low SSI    :  - NTD    ID:  - NTD    Heme:  - NTD    Ppx:  - DVT: heparin subQ 79M pmh metastatic colon Ca, HTN, DM w/ frequent ED visits for hypoglycemia, hypothyroidism here for AMS w/ intubation for agitation. Seizures from mets vs hypoglycemia differential    Neuro:  - sedated on propofol, precedex  - CT head and CTA negative for acute process  - neuro consulted, recommend routine EEG - after patient is off sedation for few hours strictly and MRI brain w/ and w/o contrast    Cardiovascular:  - HTN- on home losartan, carvedilol, amlodipine, furosemide  - holding meds 2/2 soft BPs, restart as necessary    Pulmonary:  - intubated, stable  - CXR showed in correct position    GI:  - dysphagia w/ 2x heimlich maneuvers in past week  - OG tube placed, CXR showed in correct position  - will start nutrition  - speech and swallow eval    Renal/Electrolytes:  - NTD    Endocrine:  DM II  - frequent episodes of hypoglycemia w/ prior admission, follows outpatient w/ endocrine  - on lantus 14U, SSI at home  - monitor POC glucose Q6H  - on low SSI    :  - NTD    ID:  - NTD    Heme:  - metastatic colon cancer so far stable, treated w/ xeloda, IV chemo infusion    Ppx:  - DVT: heparin subQ 79M pmh metastatic colon Ca, HTN, DM w/ frequent ED visits for hypoglycemia, hypothyroidism here for AMS w/ intubation for agitation. Seizures from mets vs hypoglycemia differential    Neuro:  - sedated on propofol, precedex  - CT head and CTA negative for acute process  - neuro consulted, recommend routine EEG - after patient is off sedation for few hours strictly and MRI brain w/ and w/o contrast    Cardiovascular:  - HTN- on home losartan, carvedilol, amlodipine, furosemide  - holding meds 2/2 soft BPs, restart as necessary    Pulmonary:  - intubated, stable  - CXR showed in correct position    GI:  - dysphagia w/ 2x heimlich maneuvers in past week  - OG tube placed, CXR showed in correct position  - will start nutrition  - speech and swallow eval    Renal/Electrolytes:  - NTD    Endocrine:  DM II  - frequent episodes of hypoglycemia w/ prior admission, follows outpatient w/ endocrine  - on lantus 14U, SSI at home  - monitor POC glucose Q6H  - on low SSI  Hypothyroidism  - TSH ordered to r/o hypothyroidism cause of AMS    :  - NTD    ID:  - BCx, Ucx pending    Heme:  - metastatic colon cancer so far stable, treated w/ xeloda, IV chemo infusion    Ppx:  - DVT: heparin subQ 79M pmh metastatic colon Ca, HTN, DM w/ frequent ED visits for hypoglycemia, hypothyroidism here for AMS w/ intubation for agitation and to allow CT head. Ddx for encephalopathy includes seizures, metastatic disease, hypoglycemia, sepsis (no overt source).    Neuro:  - sedated on propofol, precedex  - CT head and CTA negative for acute process  - neuro consulted, recommend routine EEG - after patient is off sedation for few hours strictly and MRI brain w/ and w/o contrast    Cardiovascular:  - HTN- on home losartan, carvedilol, amlodipine, furosemide  - holding meds 2/2 soft BPs, restart as necessary    Pulmonary:  - intubated, stable  - CXR showed in correct position    GI:  - dysphagia w/ 2x heimlich maneuvers in past week  - OG tube placed, CXR showed in correct position  - will start nutrition  - speech and swallow eval    Renal/Electrolytes:  - NTD    Endocrine:  DM II  - frequent episodes of hypoglycemia w/ prior admission, follows outpatient w/ endocrine  - on lantus 14U, SSI at home  - monitor POC glucose Q6H  - on low SSI  Hypothyroidism  - TSH ordered to r/o hypothyroidism cause of AMS    :  - NTD    ID:  - BCx, Ucx pending    Heme:  - metastatic colon cancer so far stable, treated w/ xeloda, IV chemo infusion    Ppx:  - DVT: heparin subQ

## 2019-09-10 NOTE — ED PROCEDURE NOTE - ATTENDING CONTRIBUTION TO CARE
I have participated in and supervised all key portions of the above procedures and agree with the above documentation.

## 2019-09-10 NOTE — H&P ADULT - NSHPLABSRESULTS_GEN_ALL_CORE
15.1   8.6   )-----------( 205      ( 09 Sep 2019 22:15 )             42.8     Auto Eosinophil # 0.3   / Auto Eosinophil % 3.8   / Auto Neutrophil # 6.2   / Auto Neutrophil % 72.1  / BANDS % x            135  |  97  |  19  ----------------------------<  80  4.3   |  27  |  1.14    Ca    9.6      09 Sep 2019 22:15  TPro  7.4  /  Alb  3.5  /  TBili  0.9  /  DBili  x   /  AST  25  /  ALT  17  /  AlkPhos  125<H>        Urinalysis Basic - ( 09 Sep 2019 22:56 )    Color: Light Yellow / Appearance: Clear / S.008 / pH: x  Gluc: x / Ketone: Negative  / Bili: Negative / Urobili: Negative   Blood: x / Protein: 30 mg/dL / Nitrite: Negative   Leuk Esterase: Negative / RBC: 6 /hpf / WBC 0 /HPF   Sq Epi: x / Non Sq Epi: 0 /hpf / Bacteria: Negative      RESPIRATORY  VENT:  Mode: AC/ CMV (Assist Control/ Continuous Mandatory Ventilation), RR (machine): 16, TV (machine): 500, FiO2: 40, PEEP: 5, PIP: 22  ABG:     VBG:      < from: CT Head No Cont (19 @ 22:34) >  IMPRESSION:   No evidence of acute intracranial hemorrhage, midline shift, or   hydrocephalus.    If symptoms persist, correlate with neurologic evaluation to determine if   further evaluation with MRI is warranted, if there are no   contraindications.        < from: CT Angio Head w/ IV Cont (19 @ 23:29) >  IMPRESSION:    No high-grade stenosis, occlusion, or dissection involving the cervical   arterial vasculature.    Aberrant right subclavian artery, normal variant. 15.1   8.6   )-----------( 205      ( 09 Sep 2019 22:15 )             42.8     Auto Eosinophil # 0.3   / Auto Eosinophil % 3.8   / Auto Neutrophil # 6.2   / Auto Neutrophil % 72.1  / BANDS % x            135  |  97  |  19  ----------------------------<  80  4.3   |  27  |  1.14    Ca    9.6      09 Sep 2019 22:15  TPro  7.4  /  Alb  3.5  /  TBili  0.9  /  DBili  x   /  AST  25  /  ALT  17  /  AlkPhos  125<H>        Urinalysis Basic - ( 09 Sep 2019 22:56 )    Color: Light Yellow / Appearance: Clear / S.008 / pH: x  Gluc: x / Ketone: Negative  / Bili: Negative / Urobili: Negative   Blood: x / Protein: 30 mg/dL / Nitrite: Negative   Leuk Esterase: Negative / RBC: 6 /hpf / WBC 0 /HPF   Sq Epi: x / Non Sq Epi: 0 /hpf / Bacteria: Negative      RESPIRATORY  VENT:  Mode: AC/ CMV (Assist Control/ Continuous Mandatory Ventilation), RR (machine): 16, TV (machine): 500, FiO2: 40, PEEP: 5, PIP: 22  ABG:     VBG:      < from: CT Head No Cont (19 @ 22:34) >  IMPRESSION:   No evidence of acute intracranial hemorrhage, midline shift, or   hydrocephalus.    If symptoms persist, correlate with neurologic evaluation to determine if   further evaluation with MRI is warranted, if there are no   contraindications.      < from: CT Angio Head w/ IV Cont (19 @ 23:29) >  IMPRESSION:    No high-grade stenosis, occlusion, or dissection involving the cervical   arterial vasculature.    Aberrant right subclavian artery, normal variant.

## 2019-09-10 NOTE — H&P ADULT - NSHPPHYSICALEXAM_GEN_ALL_CORE
PHYSICAL EXAM:  GENERAL: NAD, sedated intubated  HEAD:  Atraumatic, Normocephalic  EYES: conjunctiva and sclera clear  HENT: No tonsillar erythema, exudates, or enlargement; Moist mucous membranes  NECK: Supple, No JVD, Normal thyroid  HEART: Regular rate and rhythm; No murmurs, rubs, or gallops  RESPIRATORY: CTA B/L, No W/R/R  ABDOMEN: Soft, Nontender, Nondistended; Bowel sounds present  NEUROLOGY: A&Ox3, nonfocal, moving all extremities  EXTREMITIES:  2+ Peripheral Pulses, No clubbing, cyanosis, or edema, Symmetric lower extremities  SKIN: warm, dry, normal color, no rash or abnormal lesions

## 2019-09-11 LAB
ALBUMIN SERPL ELPH-MCNC: 2.6 G/DL — LOW (ref 3.3–5)
ALP SERPL-CCNC: 112 U/L — SIGNIFICANT CHANGE UP (ref 40–120)
ALT FLD-CCNC: 16 U/L — SIGNIFICANT CHANGE UP (ref 10–45)
ANION GAP SERPL CALC-SCNC: 15 MMOL/L — SIGNIFICANT CHANGE UP (ref 5–17)
APTT BLD: 31.7 SEC — SIGNIFICANT CHANGE UP (ref 27.5–36.3)
AST SERPL-CCNC: 33 U/L — SIGNIFICANT CHANGE UP (ref 10–40)
BASE EXCESS BLDV CALC-SCNC: -1.2 MMOL/L — SIGNIFICANT CHANGE UP (ref -2–2)
BILIRUB SERPL-MCNC: 0.8 MG/DL — SIGNIFICANT CHANGE UP (ref 0.2–1.2)
BUN SERPL-MCNC: 20 MG/DL — SIGNIFICANT CHANGE UP (ref 7–23)
CA-I SERPL-SCNC: 1.15 MMOL/L — SIGNIFICANT CHANGE UP (ref 1.12–1.3)
CALCIUM SERPL-MCNC: 8.3 MG/DL — LOW (ref 8.4–10.5)
CHLORIDE BLDV-SCNC: 103 MMOL/L — SIGNIFICANT CHANGE UP (ref 96–108)
CHLORIDE SERPL-SCNC: 98 MMOL/L — SIGNIFICANT CHANGE UP (ref 96–108)
CO2 BLDV-SCNC: 23 MMOL/L — SIGNIFICANT CHANGE UP (ref 22–30)
CO2 SERPL-SCNC: 20 MMOL/L — LOW (ref 22–31)
CREAT SERPL-MCNC: 1.13 MG/DL — SIGNIFICANT CHANGE UP (ref 0.5–1.3)
GAS PNL BLDA: SIGNIFICANT CHANGE UP
GAS PNL BLDA: SIGNIFICANT CHANGE UP
GAS PNL BLDV: 131 MMOL/L — LOW (ref 135–145)
GAS PNL BLDV: SIGNIFICANT CHANGE UP
GLUCOSE BLDC GLUCOMTR-MCNC: 134 MG/DL — HIGH (ref 70–99)
GLUCOSE BLDC GLUCOMTR-MCNC: 175 MG/DL — HIGH (ref 70–99)
GLUCOSE BLDC GLUCOMTR-MCNC: 193 MG/DL — HIGH (ref 70–99)
GLUCOSE BLDC GLUCOMTR-MCNC: 195 MG/DL — HIGH (ref 70–99)
GLUCOSE BLDV-MCNC: 210 MG/DL — HIGH (ref 70–99)
GLUCOSE SERPL-MCNC: 217 MG/DL — HIGH (ref 70–99)
HCO3 BLDV-SCNC: 22 MMOL/L — SIGNIFICANT CHANGE UP (ref 21–29)
HCT VFR BLD CALC: 38.2 % — LOW (ref 39–50)
HCT VFR BLDA CALC: 41 % — SIGNIFICANT CHANGE UP (ref 39–50)
HGB BLD CALC-MCNC: 13.4 G/DL — SIGNIFICANT CHANGE UP (ref 13–17)
HGB BLD-MCNC: 13.6 G/DL — SIGNIFICANT CHANGE UP (ref 13–17)
INR BLD: 1.49 RATIO — HIGH (ref 0.88–1.16)
LACTATE BLDV-MCNC: 1.8 MMOL/L — SIGNIFICANT CHANGE UP (ref 0.7–2)
MAGNESIUM SERPL-MCNC: 1.9 MG/DL — SIGNIFICANT CHANGE UP (ref 1.6–2.6)
MCHC RBC-ENTMCNC: 35.6 GM/DL — SIGNIFICANT CHANGE UP (ref 32–36)
MCHC RBC-ENTMCNC: 38.7 PG — HIGH (ref 27–34)
MCV RBC AUTO: 109 FL — HIGH (ref 80–100)
OTHER CELLS CSF MANUAL: 18 ML/DL — SIGNIFICANT CHANGE UP (ref 18–22)
PCO2 BLDV: 33 MMHG — LOW (ref 35–50)
PH BLDV: 7.44 — SIGNIFICANT CHANGE UP (ref 7.35–7.45)
PHOSPHATE SERPL-MCNC: 4.1 MG/DL — SIGNIFICANT CHANGE UP (ref 2.5–4.5)
PLATELET # BLD AUTO: 144 K/UL — LOW (ref 150–400)
PO2 BLDV: 84 MMHG — HIGH (ref 25–45)
POTASSIUM BLDV-SCNC: 3.5 MMOL/L — SIGNIFICANT CHANGE UP (ref 3.5–5.3)
POTASSIUM SERPL-MCNC: 3.8 MMOL/L — SIGNIFICANT CHANGE UP (ref 3.5–5.3)
POTASSIUM SERPL-SCNC: 3.8 MMOL/L — SIGNIFICANT CHANGE UP (ref 3.5–5.3)
PROCALCITONIN SERPL-MCNC: 0.14 NG/ML — HIGH (ref 0.02–0.1)
PROT SERPL-MCNC: 6.1 G/DL — SIGNIFICANT CHANGE UP (ref 6–8.3)
PROTHROM AB SERPL-ACNC: 17.4 SEC — HIGH (ref 10–12.9)
RBC # BLD: 3.51 M/UL — LOW (ref 4.2–5.8)
RBC # FLD: 14.9 % — HIGH (ref 10.3–14.5)
SAO2 % BLDV: 97 % — HIGH (ref 67–88)
SODIUM SERPL-SCNC: 133 MMOL/L — LOW (ref 135–145)
T4 FREE SERPL-MCNC: 1.2 NG/DL — SIGNIFICANT CHANGE UP (ref 0.9–1.8)
TSH SERPL-MCNC: 14.1 UIU/ML — HIGH (ref 0.27–4.2)
WBC # BLD: 9.3 K/UL — SIGNIFICANT CHANGE UP (ref 3.8–10.5)
WBC # FLD AUTO: 9.3 K/UL — SIGNIFICANT CHANGE UP (ref 3.8–10.5)

## 2019-09-11 PROCEDURE — 70553 MRI BRAIN STEM W/O & W/DYE: CPT | Mod: 26

## 2019-09-11 PROCEDURE — 99292 CRITICAL CARE ADDL 30 MIN: CPT

## 2019-09-11 PROCEDURE — 99291 CRITICAL CARE FIRST HOUR: CPT

## 2019-09-11 RX ORDER — INSULIN GLARGINE 100 [IU]/ML
8 INJECTION, SOLUTION SUBCUTANEOUS AT BEDTIME
Refills: 0 | Status: DISCONTINUED | OUTPATIENT
Start: 2019-09-11 | End: 2019-09-12

## 2019-09-11 RX ORDER — DEXMEDETOMIDINE HYDROCHLORIDE IN 0.9% SODIUM CHLORIDE 4 UG/ML
0.2 INJECTION INTRAVENOUS
Qty: 200 | Refills: 0 | Status: DISCONTINUED | OUTPATIENT
Start: 2019-09-11 | End: 2019-09-12

## 2019-09-11 RX ORDER — SODIUM CHLORIDE 9 MG/ML
1000 INJECTION, SOLUTION INTRAVENOUS
Refills: 0 | Status: DISCONTINUED | OUTPATIENT
Start: 2019-09-11 | End: 2019-09-17

## 2019-09-11 RX ORDER — SODIUM CHLORIDE 9 MG/ML
1000 INJECTION, SOLUTION INTRAVENOUS
Refills: 0 | Status: COMPLETED | OUTPATIENT
Start: 2019-09-11 | End: 2019-09-11

## 2019-09-11 RX ORDER — LEVOTHYROXINE SODIUM 125 MCG
100 TABLET ORAL AT BEDTIME
Refills: 0 | Status: DISCONTINUED | OUTPATIENT
Start: 2019-09-11 | End: 2019-09-12

## 2019-09-11 RX ADMIN — CHLORHEXIDINE GLUCONATE 1 APPLICATION(S): 213 SOLUTION TOPICAL at 06:10

## 2019-09-11 RX ADMIN — HEPARIN SODIUM 5000 UNIT(S): 5000 INJECTION INTRAVENOUS; SUBCUTANEOUS at 06:07

## 2019-09-11 RX ADMIN — Medication 2: at 00:39

## 2019-09-11 RX ADMIN — SODIUM CHLORIDE 50 MILLILITER(S): 9 INJECTION, SOLUTION INTRAVENOUS at 19:22

## 2019-09-11 RX ADMIN — CHLORHEXIDINE GLUCONATE 15 MILLILITER(S): 213 SOLUTION TOPICAL at 06:07

## 2019-09-11 RX ADMIN — HEPARIN SODIUM 5000 UNIT(S): 5000 INJECTION INTRAVENOUS; SUBCUTANEOUS at 13:52

## 2019-09-11 RX ADMIN — HEPARIN SODIUM 5000 UNIT(S): 5000 INJECTION INTRAVENOUS; SUBCUTANEOUS at 23:21

## 2019-09-11 RX ADMIN — DEXMEDETOMIDINE HYDROCHLORIDE IN 0.9% SODIUM CHLORIDE 3.77 MICROGRAM(S)/KG/HR: 4 INJECTION INTRAVENOUS at 03:30

## 2019-09-11 RX ADMIN — Medication 2: at 13:52

## 2019-09-11 RX ADMIN — INSULIN GLARGINE 8 UNIT(S): 100 INJECTION, SOLUTION SUBCUTANEOUS at 23:52

## 2019-09-11 RX ADMIN — Medication 2 MILLIGRAM(S): at 12:30

## 2019-09-11 RX ADMIN — SODIUM CHLORIDE 50 MILLILITER(S): 9 INJECTION, SOLUTION INTRAVENOUS at 18:15

## 2019-09-11 RX ADMIN — Medication 100 MICROGRAM(S): at 23:21

## 2019-09-11 RX ADMIN — PANTOPRAZOLE SODIUM 40 MILLIGRAM(S): 20 TABLET, DELAYED RELEASE ORAL at 13:51

## 2019-09-11 RX ADMIN — Medication 2: at 06:08

## 2019-09-11 NOTE — CHART NOTE - NSCHARTNOTEFT_GEN_A_CORE
MICU Transfer Note    Transfer from: MICU    Transfer to: ( x ) Medicine    (  ) Telemetry     (   ) RCU        (    ) Palliative         (   ) Stroke Unit          (   ) __________________    Accepting Physician:  Signout given to:     MICU COURSE:          ASSESSMENT & PLAN:             FOR FOLLOW UP: MICU Transfer Note    Transfer from: MICU    Transfer to: ( x ) Medicine    (  ) Telemetry     (   ) RCU        (    ) Palliative         (   ) Stroke Unit          (   ) __________________    Accepting Physician:  Signout given to:     MICU COURSE: Patient admitted to the MICU after being intubated s/p being found unresponsive at home. CT head, CTA head and neck wnl.             9/10: intubated and sedated, CT scan head CTA head, neck negative prelim read, TSH, LP unsucessful, holding home bp meds + xeloda, protonix GI prophylaxis, MRI of the head  O/N: noticed in late PM that no order was originally placed for MRI, order placed however could not expedite as per MRI techs/radiology even though it was made a protocol scan, family very upset and spoke to administration regarding there concerns in why the order was not placed when they were told so. Otherwise, weaned off sedation early am - following commands appropriately, will CPAP. added precdex gtt if needed while weaning. Hydral 10 IVP given for SBP 170s, did not r/s home meds as borderline HR and labile BP previously. 9/11: pt to go for MR today. will help with zyprexa. will need meds for agitation (Zy;prexa vs haldol) today. bedside swallow today      ASSESSMENT & PLAN:             FOR FOLLOW UP: MICU Transfer Note    Transfer from: MICU    Transfer to: ( x ) Medicine    (  ) Telemetry     (   ) RCU        (    ) Palliative         (   ) Stroke Unit          (   ) __________________    Accepting Physician:  Signout given to:     MICU COURSE: Patient admitted to the MICU after being intubated s/p being found unresponsive at home. CT head, CTA head and neck wnl. Patients avastin and xeloda held. LP attempted but was unsuccessful. Protonix started for GI prophylaxis. EEG showed moderate to severe diffuse or multifocal cerebral dysfunction. There were no epileptiform abnormalities recorded.  09/11 patient weaned off sedation and extubated. Patient mental status improved with less agitation. MRI of the head completed. Bedside swallow ordered.         ASSESSMENT & PLAN:     79M pmh metastatic colon Ca, HTN, DM w/ frequent ED visits for hypoglycemia, hypothyroidism here for AMS w/ intubation for agitation and to allow CT head. Ddx for encephalopathy includes seizures, metastatic disease, hypoglycemia, sepsis (no overt source).    Neuro:  - extubated this morning, patient aao x 2   - CT head and CTA negative for acute process  -MRI scheduled for today  -LP attempt failed  - neuro consulted, recommend routine EEG   -EEG shows moderate to severe diffuse or multifocal cerebral dysfunction. There were no epileptiform abnormalities recorded.      Cardiovascular:  - HTN- on home losartan, carvedilol, amlodipine, furosemide  - holding meds 2/2 soft BPs, restart as necessary  -IV hydralazine given for elevated BP    Pulmonary:  - extubated, stable      GI:  - dysphagia w/ 2x heimlich maneuvers in past week  - OG tube placed, CXR showed in correct position  - will start nutrition  - speech and swallow eval    Renal/Electrolytes:  - NTD    Endocrine:  DM II  - frequent episodes of hypoglycemia w/ prior admission, follows outpatient w/ endocrine  - on lantus 14U, SSI at home  - monitor POC glucose Q6H  - on low SSI  Hypothyroidism  - TSH 14.1, free T4 1.2  -patient on synthroid 137mcg at home    :  - NTD    ID:  - BCx and UCx ngtd    Heme:  - metastatic colon cancer so far stable, treated w/ xeloda, IV chemo infusion  -holding chemo    Ppx:  - DVT: heparin subQ    GOC:  full code     FOR FOLLOW UP: MICU Transfer Note    Transfer from: MICU    Transfer to: ( x ) Medicine    (  ) Telemetry     (   ) RCU        (    ) Palliative         (   ) Stroke Unit          (   ) __________________    Accepting Physician:  Signout given to:     MICU COURSE: Patient admitted to the MICU after being intubated s/p being found unresponsive at home. CT head, CTA head and neck wnl. Patients avastin and xeloda held. LP attempted but was unsuccessful. Protonix started for GI prophylaxis. EEG showed moderate to severe diffuse or multifocal cerebral dysfunction. There were no epileptiform abnormalities recorded.  09/11 patient weaned off sedation and extubated. Patient mental status improved with less agitation. MRI of the head completed. Bedside swallow ordered.         ASSESSMENT & PLAN:     79M pmh metastatic colon Ca, HTN, DM w/ frequent ED visits for hypoglycemia, hypothyroidism here for AMS w/ intubation for agitation and to allow CT head. Ddx for encephalopathy includes seizures, metastatic disease, hypoglycemia, sepsis (no overt source).    Neuro:  - extubated this morning, patient aao x 2   - CT head and CTA negative for acute process  -MRI scheduled for today  -LP attempt failed  - neuro consulted, recommend routine EEG   -EEG shows moderate to severe diffuse or multifocal cerebral dysfunction. There were no epileptiform abnormalities recorded.      Cardiovascular:  - HTN- on home losartan, carvedilol, amlodipine, furosemide  - holding meds 2/2 soft BPs, restart as necessary  -IV hydralazine given for elevated BP    Pulmonary:  - extubated, stable      GI:  - dysphagia w/ 2x heimlich maneuvers in past week  - OG tube placed, CXR showed in correct position  - will start nutrition  - speech and swallow eval    Renal/Electrolytes:  - NTD    Endocrine:  DM II  - frequent episodes of hypoglycemia w/ prior admission, follows outpatient w/ endocrine  - on lantus 14U, SSI at home  - monitor POC glucose Q6H  - on low SSI  Hypothyroidism  - TSH 14.1, free T4 1.2  -patient on synthroid 137mcg at home    :  - NTD    ID:  - BCx and UCx ngtd    Heme:  - metastatic colon cancer so far stable, treated w/ xeloda, IV chemo infusion  -holding chemo    Ppx:  - DVT: heparin subQ    GOC:  full code     FOR FOLLOW UP:  -endo consult for episodes of hypoglycemia   -f/u MRI results MICU Transfer Note    Transfer from: MICU    Transfer to: ( x ) Medicine    (  ) Telemetry     (   ) RCU        (    ) Palliative         (   ) Stroke Unit          (   ) __________________    Accepting Physician:  Signout given to:     MICU COURSE: Patient admitted to the MICU after being intubated s/p being found unresponsive at home. CT head, CTA head and neck wnl. Patients avastin and xeloda held. LP attempted but was unsuccessful. Protonix started for GI prophylaxis. EEG showed moderate to severe diffuse or multifocal cerebral dysfunction. There were no epileptiform abnormalities recorded.  09/11 patient weaned off sedation and extubated. Patient mental status improved with less agitation. MRI of the head completed. Bedside swallow ordered.         ASSESSMENT & PLAN:     79M pmh metastatic colon Ca, HTN, DM w/ frequent ED visits for hypoglycemia, hypothyroidism here for AMS w/ intubation for agitation and to allow CT head. Ddx for encephalopathy includes seizures, metastatic disease, hypoglycemia, sepsis (no overt source).    Neuro:  - extubated this morning, patient aao x 2   - CT head and CTA negative for acute process  -MRI scheduled for today  -LP attempt failed  - neuro consulted, recommend routine EEG   -EEG shows moderate to severe diffuse or multifocal cerebral dysfunction. There were no epileptiform abnormalities recorded.      Cardiovascular:  - HTN- on home losartan, carvedilol, amlodipine, furosemide  - holding meds 2/2 soft BPs, restart as necessary  -IV hydralazine given for elevated BP    Pulmonary:  - extubated, stable      GI:  - dysphagia w/ 2x heimlich maneuvers in past week  - OG tube placed, CXR showed in correct position  - will start nutrition  - speech and swallow eval    Renal/Electrolytes:  - NTD    Endocrine:  DM II  - frequent episodes of hypoglycemia w/ prior admission, follows outpatient w/ endocrine  - on lantus 14U, SSI at home  - monitor POC glucose Q6H  - endo consulted will see the patient tomorrow. Current rec lantus 8 units QHS, low ISS  Hypothyroidism  - TSH 14.1, free T4 1.2  -patient on synthroid 137mcg at home, however cannot tolerate PO meds  -100mcg IV synthroid QHS    :  - NTD    ID:  - BCx and UCx ngtd    Heme:  - metastatic colon cancer so far stable, treated w/ xeloda, IV chemo infusion  -holding chemo    Ppx:  - DVT: heparin subQ    GOC:  full code     FOR FOLLOW UP:  -f/u endo recs  -f/u MRI results  -IV synthroid 100mcg QHS  -8 units lantus QHS and low dose ISS  -start IV metoprolol as needed for HTN MICU Transfer Note    Transfer from: MICU    Transfer to: ( x ) Medicine    (  ) Telemetry     (   ) RCU        (    ) Palliative         (   ) Stroke Unit          (   ) __________________    Accepting Physician: Dr. Belle  Signout given to:     MICU COURSE: Patient admitted to the MICU after being intubated s/p being found unresponsive at home. CT head, CTA head and neck wnl. Patients avastin and xeloda held. LP attempted but was unsuccessful. Protonix started for GI prophylaxis. EEG showed moderate to severe diffuse or multifocal cerebral dysfunction. There were no epileptiform abnormalities recorded.  09/11 patient weaned off sedation and extubated. Patient mental status improved with less agitation. MRI of the head normal. Bedside swallow ordered.         ASSESSMENT & PLAN:     79M pmh metastatic colon Ca, HTN, DM w/ frequent ED visits for hypoglycemia, hypothyroidism here for AMS w/ intubation for agitation and to allow CT head. Ddx for encephalopathy includes seizures, metastatic disease, hypoglycemia, sepsis (no overt source).    Neuro:  - extubated this morning, patient aao x 2   - CT head and CTA negative for acute process  -MRI normal  -LP attempt failed  - neuro consulted, recommend routine EEG   -EEG shows moderate to severe diffuse or multifocal cerebral dysfunction. There were no epileptiform abnormalities recorded.      Cardiovascular:  - HTN- on home losartan, carvedilol, amlodipine, furosemide  - holding meds 2/2 soft BPs, restart as necessary  -IV hydralazine given for elevated BP    Pulmonary:  - extubated, stable      GI:  - dysphagia w/ 2x heimlich maneuvers in past week  - OG tube placed, CXR showed in correct position  - will start nutrition  - speech and swallow eval    Renal/Electrolytes:  - NTD    Endocrine:  DM II  - frequent episodes of hypoglycemia w/ prior admission, follows outpatient w/ endocrine  - on lantus 14U, SSI at home  - monitor POC glucose Q6H  - endo consulted will see the patient tomorrow. Current rec lantus 8 units QHS, low ISS  Hypothyroidism  - TSH 14.1, free T4 1.2  -patient on synthroid 137mcg at home, however cannot tolerate PO meds  -100mcg IV synthroid QHS    :  - NTD    ID:  - BCx and UCx ngtd    Heme:  - metastatic colon cancer so far stable, treated w/ xeloda, IV chemo infusion  -holding chemo    Ppx:  - DVT: heparin subQ    GOC:  full code     FOR FOLLOW UP:  -f/u endo recs  -IV synthroid 100mcg QHS  -8 units lantus QHS and low dose ISS  -start IV metoprolol as needed for HTN

## 2019-09-11 NOTE — DIETITIAN INITIAL EVALUATION ADULT. - OTHER INFO
Pt seen for: MICU Length Of Stay   Adm dx: AMS, extubated this am. Has mets colon cancer, on IV chemo weekly, followed by oral chemo for 7 days. Has had previous issues w/ episodes of hypoglycemia.   GI issues: no N/V   Last BM: had 2 BM today, one liquid.    Food allergies/Intolerances: NKFA   Vit/supplement PTA: Mg, multivitamin, B12, potassium    Diet PTA: per wife, education had been given by diabetes educator, she now counts carbs and gives protein w/ each meal and snack.      Subjective/Objective information: pt able to provide limited info then fell asleep, wife and dtr provided rest of hx. They report pt had good appetite PTA, eats 3 meals daily, has snack at night (protein and carb)  if BG is < 300. Checks BG before each meal and before going to bed. Per Endocrine consult done on 7/17/19 recommendation for A1c to be < 8.0, not < 7.0. A1c this adm 6.7. Per wife wt 1 year ago was ~181 lb, is weighed weely at chemo w/ clothes on wt is usually ~171 lb, current wt 9/11 170 lb.    Education: no written education needed at this time, verbally reviewed Consistent Carbohydrate diet

## 2019-09-11 NOTE — AIRWAY REMOVAL NOTE  ADULT & PEDS - ARTIFICAL AIRWAY REMOVAL COMMENTS
Written order for extubation verified. The patient was identified by full name and birth date compared to the identification band. Present during the procedure was DR. Rich

## 2019-09-11 NOTE — PROGRESS NOTE ADULT - SUBJECTIVE AND OBJECTIVE BOX
INTERVAL HPI/OVERNIGHT EVENTS: Patient extubated this morning (19). 10mg hydralazine given overnight for elevated BP      SUBJECTIVE: Patient seen and examined at bedside. Patient denies any chest pain, SOB, nausea, vomiting.     CONSTITUTIONAL: No weakness, fevers or chills  EYES/ENT: No visual changes;  No vertigo or throat pain   NECK: No pain or stiffness  RESPIRATORY: No cough, wheezing, hemoptysis; No shortness of breath  CARDIOVASCULAR: No chest pain or palpitations  GASTROINTESTINAL: No abdominal or epigastric pain. No nausea, vomiting, or hematemesis; No diarrhea or constipation. No melena or hematochezia.  GENITOURINARY: No dysuria, frequency or hematuria  NEUROLOGICAL: No numbness or weakness  SKIN: No itching, rashes    OBJECTIVE:    VITAL SIGNS:  ICU Vital Signs Last 24 Hrs  T(C): 37.2 (11 Sep 2019 04:00), Max: 37.3 (11 Sep 2019 00:00)  T(F): 98.9 (11 Sep 2019 04:00), Max: 99.2 (11 Sep 2019 00:00)  HR: 68 (11 Sep 2019 07:00) (54 - 84)  BP: 106/51 (11 Sep 2019 07:00) (102/58 - 173/80)  BP(mean): 73 (11 Sep 2019 07:00) (73 - 115)  ABP: --  ABP(mean): --  RR: 23 (11 Sep 2019 07:00) (15 - 27)  SpO2: 95% (11 Sep 2019 07:00) (95% - 100%)    Mode: CPAP with PS, FiO2: 30, PEEP: 5, PS: 5, MAP: 7, PIP: 11    09-10 @ 07:01  -   @ 07:00  --------------------------------------------------------  IN: 316.7 mL / OUT: 1350 mL / NET: -1033.3 mL      CAPILLARY BLOOD GLUCOSE      POCT Blood Glucose.: 175 mg/dL (11 Sep 2019 06:00)      PHYSICAL EXAM:    General: Mildly agitated but NAD   HEENT: NC/AT; PERRL, clear conjunctiva  Neck: supple  Respiratory: CTA b/l  Cardiovascular: +S1/S2; RRR  Abdomen: soft, NT/ND; +BS x4  Extremities: WWP, 2+ peripheral pulses b/l; no LE edema  Skin: normal color and turgor; no rash  Neurological: AO x 2    MEDICATIONS:  MEDICATIONS  (STANDING):  chlorhexidine 0.12% Liquid 15 milliLiter(s) Oral Mucosa every 12 hours  chlorhexidine 4% Liquid 1 Application(s) Topical <User Schedule>  dexmedetomidine Infusion 0.2 MICROgram(s)/kG/Hr (3.77 mL/Hr) IV Continuous <Continuous>  dextrose 5%. 1000 milliLiter(s) (50 mL/Hr) IV Continuous <Continuous>  dextrose 50% Injectable 12.5 Gram(s) IV Push once  dextrose 50% Injectable 25 Gram(s) IV Push once  dextrose 50% Injectable 25 Gram(s) IV Push once  heparin  Injectable 5000 Unit(s) SubCutaneous every 8 hours  insulin glargine Injectable (LANTUS) 14 Unit(s) SubCutaneous every morning  insulin lispro (HumaLOG) corrective regimen sliding scale   SubCutaneous every 6 hours  levothyroxine 137 MICROGram(s) Oral daily  multivitamin/minerals 1 Tablet(s) Oral daily  pantoprazole  Injectable 40 milliGRAM(s) IV Push daily  PARoxetine 20 milliGRAM(s) Oral daily  propofol Infusion 10 MICROgram(s)/kG/Min (4.2 mL/Hr) IV Continuous <Continuous>  pyridoxine 100 milliGRAM(s) Oral daily    MEDICATIONS  (PRN):  dextrose 40% Gel 15 Gram(s) Oral once PRN Blood Glucose LESS THAN 70 milliGRAM(s)/deciliter  glucagon  Injectable 1 milliGRAM(s) IntraMuscular once PRN Glucose LESS THAN 70 milligrams/deciliter      ALLERGIES:  Allergies    No Known Allergies    Intolerances        LABS:                        13.6   9.3   )-----------( 144      ( 11 Sep 2019 00:43 )             38.2     09-11    133<L>  |  98  |  20  ----------------------------<  217<H>  3.8   |  20<L>  |  1.13    Ca    8.3<L>      11 Sep 2019 00:43  Phos  4.1     09-11  Mg     1.9     09-11    TPro  6.1  /  Alb  2.6<L>  /  TBili  0.8  /  DBili  x   /  AST  33  /  ALT  16  /  AlkPhos  112      PT/INR - ( 11 Sep 2019 00:43 )   PT: 17.4 sec;   INR: 1.49 ratio         PTT - ( 11 Sep 2019 00:43 )  PTT:31.7 sec  Urinalysis Basic - ( 09 Sep 2019 22:56 )    Color: Light Yellow / Appearance: Clear / S.008 / pH: x  Gluc: x / Ketone: Negative  / Bili: Negative / Urobili: Negative   Blood: x / Protein: 30 mg/dL / Nitrite: Negative   Leuk Esterase: Negative / RBC: 6 /hpf / WBC 0 /HPF   Sq Epi: x / Non Sq Epi: 0 /hpf / Bacteria: Negative        RADIOLOGY & ADDITIONAL TESTS: Reviewed.

## 2019-09-11 NOTE — DIETITIAN INITIAL EVALUATION ADULT. - PHYSICAL APPEARANCE
Nutrition Focused Physical Assessment performed w/ wife's consent, noted moderate muscle loss in temporal area.

## 2019-09-11 NOTE — PROGRESS NOTE ADULT - ATTENDING COMMENTS
80 y/o male with hx metastatic colon ca on Avastin and Xeloda, HTN, DM with frequent hypoglycemic episodes, hypothyroidism, admitted with hypoglycemic encephalopathy and acute resp failure. He has been having choking episodes at home per wife.     Extubated overnight   No gross neuro deficits on exam today     Recs:   Neuro - f/u MRI brain, wean off Precedex. EEG neg for seizures   CV - HTN - resume home meds (losartan and Coreg at low doses) after swallow eval   Resp - stable   GI - swallow eval, may need an objective test in view of recent choking episodes, start po diet if passes swallow eval, aspiration precautions   Renal - fn stable   ID - monitoring off abx   Endo - glucose stable at this time, on Lantus and Humalog sliding scale   [facility] - PT, OT eval     Can be transferred to medicine once off Precedex 80 y/o male with hx metastatic colon ca on Avastin and Xeloda, HTN, DM with frequent hypoglycemic episodes, hypothyroidism, admitted with hypoglycemic encephalopathy and acute resp failure. He has been having choking episodes at home per wife.     Extubated overnight   No gross neuro deficits on exam today     Recs:   Neuro - f/u MRI brain, wean off Precedex. EEG neg for seizures   CV - HTN - resume home meds (losartan and Coreg at low doses) after swallow eval   Resp - stable   GI - swallow eval, may need an objective test in view of recent choking episodes, start po diet if passes swallow eval, aspiration precautions   Renal - fn stable   ID - monitoring off abx   Endo - glucose stable at this time, on Lantus and Humalog sliding scale, consult endocrine. Continue Synthroid for hypothyroidism, repeat TSH as outpatient and adjust dose prn   [facility] - PT, OT eval     Can be transferred to medicine once off Precedex

## 2019-09-11 NOTE — PROGRESS NOTE ADULT - ASSESSMENT
79M pmh metastatic colon Ca, HTN, DM w/ frequent ED visits for hypoglycemia, hypothyroidism here for AMS w/ intubation for agitation and to allow CT head. Ddx for encephalopathy includes seizures, metastatic disease, hypoglycemia, sepsis (no overt source).    Neuro:  - extubated this morning, patient aao x 2   - CT head and CTA negative for acute process  -MRI scheduled for today  -LP attempt failed  - neuro consulted, recommend routine EEG   -EEG shows moderate to severe diffuse or multifocal cerebral dysfunction. There were no epileptiform abnormalities recorded.      Cardiovascular:  - HTN- on home losartan, carvedilol, amlodipine, furosemide  - holding meds 2/2 soft BPs, restart as necessary  -IV hydralazine given for elevated BP    Pulmonary:  - extubated, stable      GI:  - dysphagia w/ 2x heimlich maneuvers in past week  - OG tube placed, CXR showed in correct position  - will start nutrition  - speech and swallow eval    Renal/Electrolytes:  - NTD    Endocrine:  DM II  - frequent episodes of hypoglycemia w/ prior admission, follows outpatient w/ endocrine  - on lantus 14U, SSI at home  - monitor POC glucose Q6H  - on low SSI  Hypothyroidism  - TSH and free t4 ordered to r/o hypothyroidism cause of AMS    :  - NTD    ID:  - BCx, Ucx pending    Heme:  - metastatic colon cancer so far stable, treated w/ xeloda, IV chemo infusion    Ppx:  - DVT: heparin subQ 79M pmh metastatic colon Ca, HTN, DM w/ frequent ED visits for hypoglycemia, hypothyroidism here for AMS w/ intubation for agitation and to allow CT head. Ddx for encephalopathy includes seizures, metastatic disease, hypoglycemia, sepsis (no overt source).    Neuro:  - extubated this morning, patient aao x 2   - CT head and CTA negative for acute process  -MRI scheduled for today  -LP attempt failed  - neuro consulted, recommend routine EEG   -EEG shows moderate to severe diffuse or multifocal cerebral dysfunction. There were no epileptiform abnormalities recorded.      Cardiovascular:  - HTN- on home losartan, carvedilol, amlodipine, furosemide  -IV hydralazine given for elevated BP  -start half dose home carvedilol and losartan if patient passes swallow eval     Pulmonary:  - extubated, stable      GI:  - dysphagia w/ 2x heimlich maneuvers in past week  - OG tube placed, CXR showed in correct position  - will start nutrition  - speech and swallow eval    Renal/Electrolytes:  - NTD    Endocrine:  DM II  - frequent episodes of hypoglycemia w/ prior admission, follows outpatient w/ endocrine  - on lantus 14U, SSI at home  - monitor POC glucose Q6H  - on low SSI  -will need endo consult     Hypothyroidism  - TSH 14.1 and free t4 1.2  -holding home synthroid 137mcg until pass swallow eval     :  - NTD    ID:  - BCx and Ucx ngtd    Heme:  - metastatic colon cancer so far stable, treated w/ xeloda, IV chemo infusion  -holding chemo meds     Ppx:  - DVT: heparin subQ    GOC:  patient full code

## 2019-09-11 NOTE — DIETITIAN INITIAL EVALUATION ADULT. - FACTORS AFF FOOD INTAKE
per wife, pt has had 2 episodes of choking recently (one with pills, the other w/ bread) where she had to do do Heimlich. Swallow eval  pending. Wife has noticed pt has had taste changes over past 6 months (has been on chemo before w/out taste changes).

## 2019-09-11 NOTE — DIETITIAN INITIAL EVALUATION ADULT. - ENERGY NEEDS
Ht: 68"   Wt: 170  BMI: 25.9 kg/m2   IBW: 154 (+/-10%)     within IBW  Edema: none        Skin: no pressure injuries documented

## 2019-09-11 NOTE — CHART NOTE - NSCHARTNOTEFT_GEN_A_CORE
Consult request received. Full consult to follow in AM. Patient known to service from prior admission, recommend start IV Synthroid 100 mcg daily if NPO and start Lantus 8 units qhs.     Delma Song MD

## 2019-09-11 NOTE — DIETITIAN INITIAL EVALUATION ADULT. - PERTINENT MEDS FT
MEDICATIONS  (STANDING):  chlorhexidine 0.12% Liquid 15 milliLiter(s) Oral Mucosa every 12 hours  chlorhexidine 4% Liquid 1 Application(s) Topical <User Schedule>  dexmedetomidine Infusion 0.2 MICROgram(s)/kG/Hr (3.77 mL/Hr) IV Continuous <Continuous>  dextrose 5%. 1000 milliLiter(s) (50 mL/Hr) IV Continuous <Continuous>  dextrose 50% Injectable 12.5 Gram(s) IV Push once  dextrose 50% Injectable 25 Gram(s) IV Push once  dextrose 50% Injectable 25 Gram(s) IV Push once  heparin  Injectable 5000 Unit(s) SubCutaneous every 8 hours  insulin glargine Injectable (LANTUS) 14 Unit(s) SubCutaneous every morning  insulin lispro (HumaLOG) corrective regimen sliding scale   SubCutaneous every 6 hours  levothyroxine 137 MICROGram(s) Oral daily  multivitamin/minerals 1 Tablet(s) Oral daily  pantoprazole  Injectable 40 milliGRAM(s) IV Push daily  PARoxetine 20 milliGRAM(s) Oral daily  propofol Infusion 10 MICROgram(s)/kG/Min (4.2 mL/Hr) IV Continuous <Continuous>  pyridoxine 100 milliGRAM(s) Oral daily    MEDICATIONS  (PRN):  dextrose 40% Gel 15 Gram(s) Oral once PRN Blood Glucose LESS THAN 70 milliGRAM(s)/deciliter  glucagon  Injectable 1 milliGRAM(s) IntraMuscular once PRN Glucose LESS THAN 70 milligrams/deciliter

## 2019-09-12 DIAGNOSIS — E03.9 HYPOTHYROIDISM, UNSPECIFIED: ICD-10-CM

## 2019-09-12 DIAGNOSIS — C18.9 MALIGNANT NEOPLASM OF COLON, UNSPECIFIED: ICD-10-CM

## 2019-09-12 DIAGNOSIS — R13.10 DYSPHAGIA, UNSPECIFIED: ICD-10-CM

## 2019-09-12 DIAGNOSIS — E11.649 TYPE 2 DIABETES MELLITUS WITH HYPOGLYCEMIA WITHOUT COMA: ICD-10-CM

## 2019-09-12 DIAGNOSIS — E22.0 ACROMEGALY AND PITUITARY GIGANTISM: ICD-10-CM

## 2019-09-12 DIAGNOSIS — Z29.9 ENCOUNTER FOR PROPHYLACTIC MEASURES, UNSPECIFIED: ICD-10-CM

## 2019-09-12 DIAGNOSIS — G93.40 ENCEPHALOPATHY, UNSPECIFIED: ICD-10-CM

## 2019-09-12 DIAGNOSIS — I10 ESSENTIAL (PRIMARY) HYPERTENSION: ICD-10-CM

## 2019-09-12 LAB
ALBUMIN SERPL ELPH-MCNC: 2.5 G/DL — LOW (ref 3.3–5)
ALP SERPL-CCNC: 115 U/L — SIGNIFICANT CHANGE UP (ref 40–120)
ALT FLD-CCNC: 18 U/L — SIGNIFICANT CHANGE UP (ref 10–45)
ANION GAP SERPL CALC-SCNC: 17 MMOL/L — SIGNIFICANT CHANGE UP (ref 5–17)
APTT BLD: 36.5 SEC — HIGH (ref 27.5–36.3)
AST SERPL-CCNC: 30 U/L — SIGNIFICANT CHANGE UP (ref 10–40)
BILIRUB SERPL-MCNC: 1.1 MG/DL — SIGNIFICANT CHANGE UP (ref 0.2–1.2)
BUN SERPL-MCNC: 24 MG/DL — HIGH (ref 7–23)
CALCIUM SERPL-MCNC: 8.3 MG/DL — LOW (ref 8.4–10.5)
CHLORIDE SERPL-SCNC: 101 MMOL/L — SIGNIFICANT CHANGE UP (ref 96–108)
CO2 SERPL-SCNC: 16 MMOL/L — LOW (ref 22–31)
CREAT SERPL-MCNC: 1.16 MG/DL — SIGNIFICANT CHANGE UP (ref 0.5–1.3)
GLUCOSE BLDC GLUCOMTR-MCNC: 105 MG/DL — HIGH (ref 70–99)
GLUCOSE BLDC GLUCOMTR-MCNC: 171 MG/DL — HIGH (ref 70–99)
GLUCOSE BLDC GLUCOMTR-MCNC: 172 MG/DL — HIGH (ref 70–99)
GLUCOSE BLDC GLUCOMTR-MCNC: 230 MG/DL — HIGH (ref 70–99)
GLUCOSE BLDC GLUCOMTR-MCNC: 249 MG/DL — HIGH (ref 70–99)
GLUCOSE BLDC GLUCOMTR-MCNC: 93 MG/DL — SIGNIFICANT CHANGE UP (ref 70–99)
GLUCOSE SERPL-MCNC: 229 MG/DL — HIGH (ref 70–99)
HCT VFR BLD CALC: 41.6 % — SIGNIFICANT CHANGE UP (ref 39–50)
HGB BLD-MCNC: 13.8 G/DL — SIGNIFICANT CHANGE UP (ref 13–17)
INR BLD: 1.41 RATIO — HIGH (ref 0.88–1.16)
MAGNESIUM SERPL-MCNC: 2 MG/DL — SIGNIFICANT CHANGE UP (ref 1.6–2.6)
MCHC RBC-ENTMCNC: 33.1 GM/DL — SIGNIFICANT CHANGE UP (ref 32–36)
MCHC RBC-ENTMCNC: 36.7 PG — HIGH (ref 27–34)
MCV RBC AUTO: 111 FL — HIGH (ref 80–100)
PHOSPHATE SERPL-MCNC: 3.8 MG/DL — SIGNIFICANT CHANGE UP (ref 2.5–4.5)
PLATELET # BLD AUTO: 145 K/UL — LOW (ref 150–400)
POTASSIUM SERPL-MCNC: 3.9 MMOL/L — SIGNIFICANT CHANGE UP (ref 3.5–5.3)
POTASSIUM SERPL-SCNC: 3.9 MMOL/L — SIGNIFICANT CHANGE UP (ref 3.5–5.3)
PROT SERPL-MCNC: 6.1 G/DL — SIGNIFICANT CHANGE UP (ref 6–8.3)
PROTHROM AB SERPL-ACNC: 16.2 SEC — HIGH (ref 10–12.9)
RBC # BLD: 3.76 M/UL — LOW (ref 4.2–5.8)
RBC # FLD: 15.4 % — HIGH (ref 10.3–14.5)
SODIUM SERPL-SCNC: 134 MMOL/L — LOW (ref 135–145)
WBC # BLD: 7.6 K/UL — SIGNIFICANT CHANGE UP (ref 3.8–10.5)
WBC # FLD AUTO: 7.6 K/UL — SIGNIFICANT CHANGE UP (ref 3.8–10.5)

## 2019-09-12 PROCEDURE — 99223 1ST HOSP IP/OBS HIGH 75: CPT | Mod: GC,AI

## 2019-09-12 PROCEDURE — 99223 1ST HOSP IP/OBS HIGH 75: CPT

## 2019-09-12 RX ORDER — INSULIN GLARGINE 100 [IU]/ML
8 INJECTION, SOLUTION SUBCUTANEOUS AT BEDTIME
Refills: 0 | Status: DISCONTINUED | OUTPATIENT
Start: 2019-09-12 | End: 2019-09-13

## 2019-09-12 RX ORDER — INSULIN LISPRO 100/ML
VIAL (ML) SUBCUTANEOUS AT BEDTIME
Refills: 0 | Status: DISCONTINUED | OUTPATIENT
Start: 2019-09-12 | End: 2019-09-18

## 2019-09-12 RX ORDER — DEXTROSE 50 % IN WATER 50 %
15 SYRINGE (ML) INTRAVENOUS ONCE
Refills: 0 | Status: DISCONTINUED | OUTPATIENT
Start: 2019-09-12 | End: 2019-09-18

## 2019-09-12 RX ORDER — DEXTROSE 50 % IN WATER 50 %
25 SYRINGE (ML) INTRAVENOUS ONCE
Refills: 0 | Status: DISCONTINUED | OUTPATIENT
Start: 2019-09-12 | End: 2019-09-13

## 2019-09-12 RX ORDER — METOPROLOL TARTRATE 50 MG
5 TABLET ORAL EVERY 6 HOURS
Refills: 0 | Status: DISCONTINUED | OUTPATIENT
Start: 2019-09-12 | End: 2019-09-12

## 2019-09-12 RX ORDER — CARVEDILOL PHOSPHATE 80 MG/1
25 CAPSULE, EXTENDED RELEASE ORAL EVERY 12 HOURS
Refills: 0 | Status: DISCONTINUED | OUTPATIENT
Start: 2019-09-12 | End: 2019-09-18

## 2019-09-12 RX ORDER — SODIUM CHLORIDE 9 MG/ML
1000 INJECTION, SOLUTION INTRAVENOUS
Refills: 0 | Status: DISCONTINUED | OUTPATIENT
Start: 2019-09-12 | End: 2019-09-18

## 2019-09-12 RX ORDER — SODIUM CHLORIDE 9 MG/ML
1000 INJECTION, SOLUTION INTRAVENOUS
Refills: 0 | Status: DISCONTINUED | OUTPATIENT
Start: 2019-09-12 | End: 2019-09-13

## 2019-09-12 RX ORDER — INSULIN LISPRO 100/ML
VIAL (ML) SUBCUTANEOUS EVERY 6 HOURS
Refills: 0 | Status: DISCONTINUED | OUTPATIENT
Start: 2019-09-12 | End: 2019-09-12

## 2019-09-12 RX ORDER — HYDRALAZINE HCL 50 MG
10 TABLET ORAL ONCE
Refills: 0 | Status: COMPLETED | OUTPATIENT
Start: 2019-09-12 | End: 2019-09-12

## 2019-09-12 RX ORDER — INSULIN LISPRO 100/ML
2 VIAL (ML) SUBCUTANEOUS
Refills: 0 | Status: DISCONTINUED | OUTPATIENT
Start: 2019-09-12 | End: 2019-09-16

## 2019-09-12 RX ORDER — DEXTROSE 50 % IN WATER 50 %
15 SYRINGE (ML) INTRAVENOUS ONCE
Refills: 0 | Status: DISCONTINUED | OUTPATIENT
Start: 2019-09-12 | End: 2019-09-13

## 2019-09-12 RX ORDER — INSULIN GLARGINE 100 [IU]/ML
6 INJECTION, SOLUTION SUBCUTANEOUS AT BEDTIME
Refills: 0 | Status: DISCONTINUED | OUTPATIENT
Start: 2019-09-12 | End: 2019-09-12

## 2019-09-12 RX ORDER — INSULIN LISPRO 100/ML
VIAL (ML) SUBCUTANEOUS
Refills: 0 | Status: DISCONTINUED | OUTPATIENT
Start: 2019-09-12 | End: 2019-09-18

## 2019-09-12 RX ORDER — DEXTROSE 50 % IN WATER 50 %
25 SYRINGE (ML) INTRAVENOUS ONCE
Refills: 0 | Status: DISCONTINUED | OUTPATIENT
Start: 2019-09-12 | End: 2019-09-18

## 2019-09-12 RX ORDER — GLUCAGON INJECTION, SOLUTION 0.5 MG/.1ML
1 INJECTION, SOLUTION SUBCUTANEOUS ONCE
Refills: 0 | Status: DISCONTINUED | OUTPATIENT
Start: 2019-09-12 | End: 2019-09-18

## 2019-09-12 RX ORDER — DEXTROSE 50 % IN WATER 50 %
50 SYRINGE (ML) INTRAVENOUS ONCE
Refills: 0 | Status: COMPLETED | OUTPATIENT
Start: 2019-09-12 | End: 2019-09-12

## 2019-09-12 RX ORDER — DEXTROSE 50 % IN WATER 50 %
12.5 SYRINGE (ML) INTRAVENOUS ONCE
Refills: 0 | Status: DISCONTINUED | OUTPATIENT
Start: 2019-09-12 | End: 2019-09-13

## 2019-09-12 RX ORDER — DEXTROSE 50 % IN WATER 50 %
12.5 SYRINGE (ML) INTRAVENOUS ONCE
Refills: 0 | Status: DISCONTINUED | OUTPATIENT
Start: 2019-09-12 | End: 2019-09-18

## 2019-09-12 RX ORDER — LEVOTHYROXINE SODIUM 125 MCG
137 TABLET ORAL DAILY
Refills: 0 | Status: DISCONTINUED | OUTPATIENT
Start: 2019-09-13 | End: 2019-09-14

## 2019-09-12 RX ORDER — LOSARTAN POTASSIUM 100 MG/1
100 TABLET, FILM COATED ORAL DAILY
Refills: 0 | Status: DISCONTINUED | OUTPATIENT
Start: 2019-09-12 | End: 2019-09-13

## 2019-09-12 RX ADMIN — INSULIN GLARGINE 8 UNIT(S): 100 INJECTION, SOLUTION SUBCUTANEOUS at 21:38

## 2019-09-12 RX ADMIN — Medication 2: at 17:21

## 2019-09-12 RX ADMIN — HEPARIN SODIUM 5000 UNIT(S): 5000 INJECTION INTRAVENOUS; SUBCUTANEOUS at 12:29

## 2019-09-12 RX ADMIN — CHLORHEXIDINE GLUCONATE 1 APPLICATION(S): 213 SOLUTION TOPICAL at 05:17

## 2019-09-12 RX ADMIN — Medication 2: at 05:24

## 2019-09-12 RX ADMIN — CARVEDILOL PHOSPHATE 25 MILLIGRAM(S): 80 CAPSULE, EXTENDED RELEASE ORAL at 20:08

## 2019-09-12 RX ADMIN — Medication 5 MILLIGRAM(S): at 12:29

## 2019-09-12 RX ADMIN — Medication 5 MILLIGRAM(S): at 08:32

## 2019-09-12 RX ADMIN — Medication 10 MILLIGRAM(S): at 07:25

## 2019-09-12 RX ADMIN — Medication 20 MILLIGRAM(S): at 21:38

## 2019-09-12 RX ADMIN — LOSARTAN POTASSIUM 100 MILLIGRAM(S): 100 TABLET, FILM COATED ORAL at 20:08

## 2019-09-12 RX ADMIN — Medication 50 MILLILITER(S): at 13:59

## 2019-09-12 RX ADMIN — HEPARIN SODIUM 5000 UNIT(S): 5000 INJECTION INTRAVENOUS; SUBCUTANEOUS at 05:17

## 2019-09-12 NOTE — SWALLOW BEDSIDE ASSESSMENT ADULT - SPECIFY REASON(S)
To assess oropharyngeal swallow dysfunction To assess oropharyngeal swallow dysfunction; "Recent choking episodes x2 requiring Heimlich"

## 2019-09-12 NOTE — PROGRESS NOTE ADULT - PROBLEM SELECTOR PLAN 7
DVT: SQH  Diet - NPO pending swallow eval  Dispo - PT eval DVT: SQH  Diet - CC/DASH  Dispo - PT eval on sandostatin monthly   wife to notify due date for next dose   Endocrine f/u

## 2019-09-12 NOTE — PROGRESS NOTE ADULT - ATTENDING COMMENTS
Patient transferred out of MICU prior to my assessment, please refer to yesterday's note for A/P and additional recs in transfer note

## 2019-09-12 NOTE — PROGRESS NOTE ADULT - ASSESSMENT
79M pmh metastatic colon Ca, HTN, DM w/ frequent ED visits for hypoglycemia, hypothyroidism here for AMS w/ intubation for agitation and to allow CT head. Ddx for encephalopathy includes seizures, metastatic disease, hypoglycemia, sepsis (no overt source).    Neuro:  - extubated 09/11, patient aao x 3 this morning in NAD  - CT head and CTA negative for acute process  -MRI scheduled for today  -LP attempt failed  -EEG shows moderate to severe diffuse or multifocal cerebral dysfunction. There were no epileptiform abnormalities recorded.    -AMS most likely 2/2 hypoglycemia     Cardiovascular:  - HTN- on home losartan, carvedilol, amlodipine, furosemide  -IV hydralazine given for elevated BP  -start half dose home carvedilol and losartan if patient passes swallow eval     Pulmonary:  - extubated, stable      GI:  - dysphagia w/ 2x heimlich maneuvers in past week  - OG tube placed, CXR showed in correct position  - will start nutrition  - speech and swallow eval    Renal/Electrolytes:  - NTD    Endocrine:  DM II  - frequent episodes of hypoglycemia w/ prior admission, follows outpatient w/ endocrine  - on lantus 14U, SSI at home  - monitor POC glucose Q6H  -8U lantus QHS in the hospital  - on low SSI  -endo consulted will see the patient today    Hypothyroidism  - TSH 14.1 and free t4 1.2  -holding home synthroid 137mcg until pass swallow eval   -100mcg IV synthroid QHS    :  - NTD    ID:  - BCx and Ucx ngtd    Heme:  - metastatic colon cancer so far stable, treated w/ xeloda, IV chemo infusion  -holding chemo meds     Ppx:  - DVT: heparin subQ    GOC:  patient full code 79M pmh metastatic colon Ca, HTN, DM w/ frequent ED visits for hypoglycemia, hypothyroidism here for AMS w/ intubation for agitation and to allow CT head. Ddx for encephalopathy includes seizures, metastatic disease, hypoglycemia, sepsis (no overt source).

## 2019-09-12 NOTE — SWALLOW BEDSIDE ASSESSMENT ADULT - SLP GENERAL OBSERVATIONS
PO trials administered on second attempt due to first attempt with patient exhibiting new onset word finding difficulties at beginning of initial evaluation. Now presenting fluent verbal output without evidence of word-finding difficulties after RN gave him IV. Finger stick rechecked at 172.

## 2019-09-12 NOTE — CONSULT NOTE ADULT - ATTENDING COMMENTS
Delma Song MD   Pager # [phone]  On evenings and weekends, please call the office at [phone] or page endocrine fellow on call. Please note that this patient may be followed by different provider tomorrow. If no answer, contact the office.

## 2019-09-12 NOTE — CONSULT NOTE ADULT - SUBJECTIVE AND OBJECTIVE BOX
HPI:  79 M PMH metastatic colon Ca, HTN, HLD DM2 with hypoglycemia, hypothyroidism, acromegaly on Sandostatin, here for AMS w/ intubation for agitation and urgent CT head, now s/p extubation. As per H&P, patient was found altered at home, wife believed that he may have been hypoglycemic, gave him glucose, called EMS, was not noted to have hypoglycemia, and was brought to the ED.     Patient known to our service from previous admission. He has a history of type 2 DM, follows with endocrinologist Dr. Fraire. He was discharged last time on Basaglar 14 units qhs (adherent to therapy) and Novolog 2 units before breakfast and lunch and 4 units before dinner. He states that he has been using a sliding scale and has been taking 2 units before lunch and 3 units before lunch and dinner. He has been checking BG daily and has been noting hypoglycemia in the AM with some BG values less than 70, and some values in the 90's or higher. He thinks he may have had one episode of daytime hypoglycemia. Does not have neuropathy in the hands or feet. No history of retinopathy, last saw optho in Jan 2019. Has CKD stage 3. Eats three meals a day but states that he has been watching the carbs. Has lost about 20 lbs in the last 6 months.    Also has a history of hypothyroidism, cannot tell me what his LT4 dose is, but as per chart he is on Lt4 137mcg daily. Reports adherence to therapy. Waits 30 minutes before eating. Does not have neck pain or dysphagia, thinks he has some dysphonia from the intubation and prior procedures. No chest pain, palpitations, abdominal pain, nausea, vomiting, diarrhea, constipation. Has been losing weight. TSH here 14.10.    Also has history of acromegaly that was diagnosed by his endocrinologist. On monthly octreotide.     PAST MEDICAL & SURGICAL HISTORY:  Hard of hearing  Neuropathy  Hypothyroidism  Type 2 diabetes mellitus  Hypertension  Colon cancer  History of bowel resection  History of cholecystectomy      FAMILY HISTORY:  FH: CVA (cerebrovascular accident)  FH: CHF (congestive heart failure)  FH: HTN (hypertension)      Social History:  No cigarette use  No alcohol use    Outpatient Medications:  · 	rolling walker: Last Dose Taken:    · 	furosemide 20 mg oral tablet: Last Dose Taken:  , 1 tab(s) orally once a day  · 	Glucagon Emergency Kit for Low Blood Sugar 1 mg injection: Last Dose Taken:  , 1 milligram(s) injectable once, As Needed  for BS <60, unable to swallow or altered mental status relatoed to low blood sugar   · 	Basaglar KwikPen 100 units/mL subcutaneous solution: Last Dose Taken:  , 14 unit(s) subcutaneous once a day  · 	NovoLOG 100 units/mL subcutaneous solution: Last Dose Taken:  , sliding scale, 1 unit for every 50 units of blood sugar reading after 150  · 	amLODIPine 2.5 mg oral tablet: Last Dose Taken:  , 1 tab(s) orally once a day  · 	SandoSTATIN 50 mcg/mL injectable solution: Last Dose Taken:  , injectable once a month  · 	aspirin 81 mg oral tablet: Last Dose Taken:  , 1 orally every third day  · 	doxycycline hyclate 100 mg oral capsule: Last Dose Taken:  , 1 cap(s) orally 2 times a day  · 	potassium chloride 20 mEq oral granule, extended release: Last Dose Taken:  , orally 4 times a day  · 	magnesium chloride: Last Dose Taken:  , 500 milligram(s) orally 2 times a day  · 	Xeloda: Last Dose Taken:  , 3 tablets 30 minutes after breakfast, 3 tablets 30 minutes after dinner  · 	carvedilol 25 mg oral tablet: Last Dose Taken:  , 1 tab(s) orally 2 times a day  · 	Synthroid 137 mcg (0.137 mg) oral tablet: Last Dose Taken:  , 1 tab(s) orally once a day  · 	losartan 100 mg oral tablet: Last Dose Taken:  , 1 tab(s) orally once a day  · 	PARoxetine 20 mg oral tablet: Last Dose Taken:  , 1 tab(s) orally once a day  · 	Vitamin B6: Last Dose Taken:  , 1 tab(s) orally once a day  · 	LORazepam 0.5 mg oral tablet: Last Dose Taken:  , 3 tab(s) orally once a day (at bedtime)  · 	Daily Multi oral tablet: Last Dose Taken:  , 1 tab(s) orally once a day    MEDICATIONS  (STANDING):  dextrose 5% + sodium chloride 0.9%. 1000 milliLiter(s) (50 mL/Hr) IV Continuous <Continuous>  dextrose 5%. 1000 milliLiter(s) (50 mL/Hr) IV Continuous <Continuous>  dextrose 50% Injectable 12.5 Gram(s) IV Push once  dextrose 50% Injectable 25 Gram(s) IV Push once  dextrose 50% Injectable 25 Gram(s) IV Push once  heparin  Injectable 5000 Unit(s) SubCutaneous every 8 hours  insulin glargine Injectable (LANTUS) 8 Unit(s) SubCutaneous at bedtime  insulin lispro (HumaLOG) corrective regimen sliding scale   SubCutaneous every 6 hours  levothyroxine Injectable 100 MICROGram(s) IV Push at bedtime  metoprolol tartrate Injectable 5 milliGRAM(s) IV Push every 6 hours  multivitamin/minerals 1 Tablet(s) Oral daily  PARoxetine 20 milliGRAM(s) Oral daily  pyridoxine 100 milliGRAM(s) Oral daily    MEDICATIONS  (PRN):  dextrose 40% Gel 15 Gram(s) Oral once PRN Blood Glucose LESS THAN 70 milliGRAM(s)/deciliter  glucagon  Injectable 1 milliGRAM(s) IntraMuscular once PRN Glucose LESS THAN 70 milligrams/deciliter      Allergies  No Known Allergies    Review of Systems:  Constitutional: No fever  Eyes: No blurry vision  Neuro: No tremors  HEENT: No pain  Cardiovascular: No chest pain, palpitations  Respiratory: No SOB, no cough  GI: No nausea, vomiting, abdominal pain  : No dysuria  Skin: no rash  Endocrine: no polyuria, polydipsia    ALL OTHER SYSTEMS REVIEWED AND NEGATIVE      PHYSICAL EXAM:  VITALS: T(C): 36.2 (09-12-19 @ 10:14)  T(F): 97.2 (09-12-19 @ 10:14), Max: 99.4 (09-12-19 @ 04:00)  HR: 62 (09-12-19 @ 10:14) (51 - 85)  BP: 157/70 (09-12-19 @ 10:14) (95/53 - 179/74)  RR:  (17 - 33)  SpO2:  (96% - 100%)  Wt(kg): --  GENERAL: NAD, well-developed  EYES: No proptosis, anicteric  HEENT:  Atraumatic, Normocephalic  THYROID: Normal size, no palpable nodules  RESPIRATORY: Clear to auscultation bilaterally; No rales, rhonchi, wheezing  CARDIOVASCULAR: Regular rate and rhythm; No murmurs; no peripheral edema  GI: Soft, nontender, non distended, normal bowel sounds  SKIN: Dry, intact, No ulcers or wounds on feet  MUSCULOSKELETAL: Full range of motion, normal strength        POCT Blood Glucose.: 171 mg/dL (09-12-19 @ 05:22)  POCT Blood Glucose.: 195 mg/dL (09-11-19 @ 23:32)  POCT Blood Glucose.: 134 mg/dL (09-11-19 @ 16:52)  POCT Blood Glucose.: 193 mg/dL (09-11-19 @ 13:50)  POCT Blood Glucose.: 175 mg/dL (09-11-19 @ 06:00)  POCT Blood Glucose.: 180 mg/dL (09-10-19 @ 23:57)  POCT Blood Glucose.: 200 mg/dL (09-10-19 @ 21:25)  POCT Blood Glucose.: 154 mg/dL (09-10-19 @ 11:50)  POCT Blood Glucose.: 144 mg/dL (09-10-19 @ 03:29)  POCT Blood Glucose.: 344 mg/dL (09-09-19 @ 22:00)  POCT Blood Glucose.: 80 mg/dL (09-09-19 @ 21:52)  POCT Blood Glucose.: 99 mg/dL (09-09-19 @ 21:37)                          13.8   7.6   )-----------( 145      ( 12 Sep 2019 00:42 )             41.6       09-12    134<L>  |  101  |  24<H>  ----------------------------<  229<H>  3.9   |  16<L>  |  1.16    EGFR if : 69  EGFR if non : 59<L>    Ca    8.3<L>      09-12  Mg     2.0     09-12  Phos  3.8     09-12    TPro  6.1  /  Alb  2.5<L>  /  TBili  1.1  /  DBili  x   /  AST  30  /  ALT  18  /  AlkPhos  115  09-12      Thyroid Function Tests:  09-11 @ 08:32 TSH 14.10 FreeT4 1.2 T3 -- Anti TPO -- Anti Thyroglobulin Ab -- TSI --      Hemoglobin A1C, Whole Blood: 6.7 % <H> [4.0 - 5.6] (07-18-19 @ 09:50)

## 2019-09-12 NOTE — SWALLOW BEDSIDE ASSESSMENT ADULT - SLP PERTINENT HISTORY OF CURRENT PROBLEM
79M pmh metastatic colon Ca, HTN, DM, hypothyroidism. Admitted for AMS. Wife reported Pt was not responding to her voice, which she realized could be from hypoglycemia (similar episodes recently too). Pt has had previous episodes of hypoglycemia requiring ED visits and hospitalization so wife gave him glucose packets. Did not improve. Wife called EMS. Initially FSG per EMS 250s, first repeat in ED 99 and second 80. Pt was given an amp of D50 but was not following commands in ED, combative and flailing in bed. Patient moved to critical area. Attempted 2mg versed IV without improvement. Intubated for stat head CT.  Per wife, only difference this time from prior visits was agitation and not following commands. Patient does not have a history of seizures and has recently been choking on food, needed Heimlich 2x in past week.

## 2019-09-12 NOTE — PROGRESS NOTE ADULT - SUBJECTIVE AND OBJECTIVE BOX
INTERVAL HPI/OVERNIGHT EVENTS: Nothing overnight     SUBJECTIVE: Patient seen and examined at bedside. No CP, SOB, nausea, vomiting, diarrhea, or abdominal pain.     CONSTITUTIONAL: No weakness, fevers or chills  EYES/ENT: No visual changes;  No vertigo or throat pain   NECK: No pain or stiffness  RESPIRATORY: No cough, wheezing, hemoptysis; No shortness of breath  CARDIOVASCULAR: No chest pain or palpitations  GASTROINTESTINAL: No abdominal or epigastric pain. No nausea, vomiting, or hematemesis; No diarrhea or constipation. No melena or hematochezia.  GENITOURINARY: No dysuria, frequency or hematuria  NEUROLOGICAL: No numbness or weakness  SKIN: No itching, rashes    OBJECTIVE:    VITAL SIGNS:  ICU Vital Signs Last 24 Hrs  T(C): 36.7 (12 Sep 2019 08:00), Max: 37.4 (12 Sep 2019 04:00)  T(F): 98.1 (12 Sep 2019 08:00), Max: 99.4 (12 Sep 2019 04:00)  HR: 65 (12 Sep 2019 07:20) (51 - 85)  BP: 177/86 (12 Sep 2019 07:20) (95/53 - 177/86)  BP(mean): 123 (12 Sep 2019 07:20) (71 - 123)  ABP: --  ABP(mean): --  RR: 22 (12 Sep 2019 07:20) (17 - 33)  SpO2: 99% (12 Sep 2019 07:20) (96% - 100%)        09-11 @ 07:01 - 09-12 @ 07:00  --------------------------------------------------------  IN: 790.5 mL / OUT: 50 mL / NET: 740.5 mL    09-12 @ 07:01  -  09-12 @ 08:19  --------------------------------------------------------  IN: 0 mL / OUT: 0 mL / NET: 0 mL      CAPILLARY BLOOD GLUCOSE      POCT Blood Glucose.: 171 mg/dL (12 Sep 2019 05:22)      PHYSICAL EXAM:    General: NAD  HEENT: NC/AT; PERRL, clear conjunctiva  Neck: supple  Respiratory: CTA b/l  Cardiovascular: +S1/S2; RRR  Abdomen: soft, NT/ND; +BS x4  Extremities: WWP, 2+ peripheral pulses b/l; no LE edema  Skin: normal color and turgor; no rash  Neurological: AAO x 3    MEDICATIONS:  MEDICATIONS  (STANDING):  chlorhexidine 4% Liquid 1 Application(s) Topical <User Schedule>  dexmedetomidine Infusion 0.2 MICROgram(s)/kG/Hr (3.77 mL/Hr) IV Continuous <Continuous>  dextrose 5% + sodium chloride 0.9%. 1000 milliLiter(s) (50 mL/Hr) IV Continuous <Continuous>  dextrose 5%. 1000 milliLiter(s) (50 mL/Hr) IV Continuous <Continuous>  dextrose 50% Injectable 12.5 Gram(s) IV Push once  dextrose 50% Injectable 25 Gram(s) IV Push once  dextrose 50% Injectable 25 Gram(s) IV Push once  heparin  Injectable 5000 Unit(s) SubCutaneous every 8 hours  insulin glargine Injectable (LANTUS) 8 Unit(s) SubCutaneous at bedtime  insulin lispro (HumaLOG) corrective regimen sliding scale   SubCutaneous every 6 hours  levothyroxine Injectable 100 MICROGram(s) IV Push at bedtime  metoprolol tartrate Injectable 5 milliGRAM(s) IV Push every 6 hours  multivitamin/minerals 1 Tablet(s) Oral daily  pantoprazole  Injectable 40 milliGRAM(s) IV Push daily  PARoxetine 20 milliGRAM(s) Oral daily  pyridoxine 100 milliGRAM(s) Oral daily    MEDICATIONS  (PRN):  dextrose 40% Gel 15 Gram(s) Oral once PRN Blood Glucose LESS THAN 70 milliGRAM(s)/deciliter  glucagon  Injectable 1 milliGRAM(s) IntraMuscular once PRN Glucose LESS THAN 70 milligrams/deciliter      ALLERGIES:  Allergies    No Known Allergies    Intolerances        LABS:                        13.8   7.6   )-----------( 145      ( 12 Sep 2019 00:42 )             41.6     09-12    134<L>  |  101  |  24<H>  ----------------------------<  229<H>  3.9   |  16<L>  |  1.16    Ca    8.3<L>      12 Sep 2019 00:42  Phos  3.8     09-12  Mg     2.0     09-12    TPro  6.1  /  Alb  2.5<L>  /  TBili  1.1  /  DBili  x   /  AST  30  /  ALT  18  /  AlkPhos  115  09-12    PT/INR - ( 12 Sep 2019 00:42 )   PT: 16.2 sec;   INR: 1.41 ratio         PTT - ( 12 Sep 2019 00:42 )  PTT:36.5 sec      RADIOLOGY & ADDITIONAL TESTS: Reviewed.

## 2019-09-12 NOTE — CONSULT NOTE ADULT - PROBLEM SELECTOR RECOMMENDATION 2
- TSH 14.10 with free T4 of 1.2  - for now, continue with IV LT4 100 mcg daily until able to take po  - would recheck TSH

## 2019-09-12 NOTE — CHART NOTE - NSCHARTNOTEFT_GEN_A_CORE
EEG results reviewed. No evidence for epileptiform abnormalities or seizures. No further inpatient neurological work-up. Neurology will sign off, please recall as needed.           EEG Classification / Summary:  Abnormal EEG study in altered patient  Severe diffuse slowing with frontal predominance at times semiperiodic with a triphasic morphology.  Intermittent discontinuity.  -----------------------------------------------------------------------------------------------------  Clinical Impression:  Severe diffuse or multifocal cerebral dysfunction.   There were no epileptiform abnormalities recorded.

## 2019-09-12 NOTE — CONSULT NOTE ADULT - PROBLEM SELECTOR RECOMMENDATION 4
- currently hypertensive, goal BP is less than 140/90 given age  - titrate anti-hypertensives as needed

## 2019-09-12 NOTE — CONSULT NOTE ADULT - PROBLEM SELECTOR RECOMMENDATION 9
- patient with hypoglycemia as outpatient, most notable in the AM, likely due to too much basal insulin  - for now, would monitor on Lantus 8 units qhs and low correction scale qac and qhs while NPO  - once diet is resumed, can likely start Humalog 2 units TID with meals  - consistent carb diet  - will follow  - for discharge: would d/c on less basal insulin and consider d/c bolus insulin and start Januvia 100 mg daily if no contraindications. Outpatient follow up with Dr. Fraire.

## 2019-09-12 NOTE — SWALLOW BEDSIDE ASSESSMENT ADULT - SWALLOW EVAL: FUNCTIONAL LEVEL AT TIME OF EVAL
Pt AA+Ox4. Pt able to follow multi-step directives. Able to answer most questions regarding PMH with wife assisting.

## 2019-09-12 NOTE — PROGRESS NOTE ADULT - PROBLEM SELECTOR PLAN 5
Required Heimlich maneuver 2 times in past month  - Passed swallow study, able to take PO meds and meals without restriction

## 2019-09-12 NOTE — SWALLOW BEDSIDE ASSESSMENT ADULT - SWALLOW EVAL: DIAGNOSIS
Order received. Chart reviewed. Patient with new onset word finding difficulties at beginning of evaluation. RN aware and MD present to assess. Swallow evaluation deferred at this time pending outcome per MD. This service will follow up as appropriate. Order received. Chart reviewed. Patient with new onset word finding difficulties at beginning of evaluation. Pt NPO and not provided with any po trials. RN and MD present to assess. Swallow evaluation deferred at this time pending outcome per MD. This service will follow up as appropriate. Pt presents with grossly functional oropharyngeal swallow. No overt signs of penetration/aspiration on all consistencies trialed. Good oral preparation marked by mildly prolonged mastication. Good oral transit and clearance. No overt signs of pharyngeal residue.

## 2019-09-12 NOTE — PROGRESS NOTE ADULT - PROBLEM SELECTOR PLAN 3
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 Patient Metastatic to liver and lung. s/p Xeroda and Avastin  - Currently stable  - Holding Chemo meds

## 2019-09-12 NOTE — PROGRESS NOTE ADULT - ASSESSMENT
79M pmh metastatic colon Ca, HTN, DM w/ frequent ED visits for hypoglycemia, hypothyroidism here for AMS w/ intubation for agitation and to allow CT head. Ddx for encephalopathy includes seizures, metastatic disease, hypoglycemia, sepsis (no overt source).    Neuro:  - extubated 09/11, patient aao x 3 this morning in NAD  - CT head and CTA negative for acute process  -MRI scheduled for today  -LP attempt failed  -EEG shows moderate to severe diffuse or multifocal cerebral dysfunction. There were no epileptiform abnormalities recorded.    -AMS most likely 2/2 hypoglycemia     Cardiovascular:  - HTN- on home losartan, carvedilol, amlodipine, furosemide  -IV hydralazine given for elevated BP  -start half dose home carvedilol and losartan if patient passes swallow eval     Pulmonary:  - extubated, stable      GI:  - dysphagia w/ 2x heimlich maneuvers in past week  - OG tube placed, CXR showed in correct position  - will start nutrition  - speech and swallow eval    Renal/Electrolytes:  - NTD    Endocrine:  DM II  - frequent episodes of hypoglycemia w/ prior admission, follows outpatient w/ endocrine  - on lantus 14U, SSI at home  - monitor POC glucose Q6H  -8U lantus QHS in the hospital  - on low SSI  -endo consulted will see the patient today    Hypothyroidism  - TSH 14.1 and free t4 1.2  -holding home synthroid 137mcg until pass swallow eval   -100mcg IV synthroid QHS    :  - NTD    ID:  - BCx and Ucx ngtd    Heme:  - metastatic colon cancer so far stable, treated w/ xeloda, IV chemo infusion  -holding chemo meds     Ppx:  - DVT: heparin subQ    GOC:  patient full code

## 2019-09-12 NOTE — PROGRESS NOTE ADULT - PROBLEM SELECTOR PLAN 4
On losartan 100 mg, carvedilol 25 mg, amlodipine 2.5 mg, lasix 20 mg at home  - Now able to take PO meds  - Start losartan and carvedilol, hold amlodipine and lasix

## 2019-09-12 NOTE — PROGRESS NOTE ADULT - SUBJECTIVE AND OBJECTIVE BOX
ACCEPT NOTE:  Supa Licona, PGY-1  Internal Medicine  Pager [phone] / [phone]    Patient is a 79y old  Male who presents with a chief complaint of AMS (12 Sep 2019 08:19)      SUBJECTIVE / OVERNIGHT EVENTS:    HPI:  79M pmh metastatic colon Ca, HTN, DM, hypothyroidism here for AMS w/ intubation for agitation. As per wife, she spoke to him last time before he decided to take nap in the evening, he c/o being tired (not new c/o since he was on chemo). Wife woke him up at 8 pm for dinner, he responded that he has to use restroom. Pt did not come back, she found him in bedroom in curling position in the bed and urinated on himself. He was not responding to voice, she realized that this could be from hypoglycemia (similar episodes recently too).  Patient has had previous episodes of hypoglycemia requiring ED visits and hospitalization so wife gave him glucose packets but did not get better so called EMS. Initially FSG per EMS 250s, first repeat in ED 99 and second 80. Pt was given an amp of D50 but was not following commands in ED, combative and flailing in bed. Patient moved to critical area. Attempted 2mg versed IV without improvement. Intubated for stat head CT. CT-head revealed no hemorrhage, midline shift, or hydrocephalus. CT angio of head and neck also performed, results pending. UA negative. Blood culture, urine cultures pending. Per wife, only difference this time from prior visits was agitation and not following commands. Patient does not have a history of seizures and has recently been choking on food, needed Heimlich 2x in past week. (10 Sep 2019 01:25)      MEDICATIONS  (STANDING):  dextrose 5% + sodium chloride 0.9%. 1000 milliLiter(s) (50 mL/Hr) IV Continuous <Continuous>  dextrose 5%. 1000 milliLiter(s) (50 mL/Hr) IV Continuous <Continuous>  dextrose 50% Injectable 12.5 Gram(s) IV Push once  dextrose 50% Injectable 25 Gram(s) IV Push once  dextrose 50% Injectable 25 Gram(s) IV Push once  heparin  Injectable 5000 Unit(s) SubCutaneous every 8 hours  insulin glargine Injectable (LANTUS) 8 Unit(s) SubCutaneous at bedtime  insulin lispro (HumaLOG) corrective regimen sliding scale   SubCutaneous every 6 hours  levothyroxine Injectable 100 MICROGram(s) IV Push at bedtime  metoprolol tartrate Injectable 5 milliGRAM(s) IV Push every 6 hours  multivitamin/minerals 1 Tablet(s) Oral daily  PARoxetine 20 milliGRAM(s) Oral daily  pyridoxine 100 milliGRAM(s) Oral daily    MEDICATIONS  (PRN):  dextrose 40% Gel 15 Gram(s) Oral once PRN Blood Glucose LESS THAN 70 milliGRAM(s)/deciliter  glucagon  Injectable 1 milliGRAM(s) IntraMuscular once PRN Glucose LESS THAN 70 milligrams/deciliter      Vital Signs Last 24 Hrs  T(C): 36.2 (12 Sep 2019 10:14), Max: 37.4 (12 Sep 2019 04:00)  T(F): 97.2 (12 Sep 2019 10:14), Max: 99.4 (12 Sep 2019 04:00)  HR: 62 (12 Sep 2019 10:14) (51 - 85)  BP: 157/70 (12 Sep 2019 10:14) (95/53 - 179/74)  BP(mean): 90 (12 Sep 2019 09:00) (71 - 123)  RR: 21 (12 Sep 2019 10:14) (17 - 33)  SpO2: 96% (12 Sep 2019 10:14) (96% - 100%)  CAPILLARY BLOOD GLUCOSE      POCT Blood Glucose.: 171 mg/dL (12 Sep 2019 05:22)  POCT Blood Glucose.: 195 mg/dL (11 Sep 2019 23:32)  POCT Blood Glucose.: 134 mg/dL (11 Sep 2019 16:52)  POCT Blood Glucose.: 193 mg/dL (11 Sep 2019 13:50)    I&O's Summary    11 Sep 2019 07:01  -  12 Sep 2019 07:00  --------------------------------------------------------  IN: 790.5 mL / OUT: 50 mL / NET: 740.5 mL    12 Sep 2019 07:01  -  12 Sep 2019 11:42  --------------------------------------------------------  IN: 0 mL / OUT: 10 mL / NET: -10 mL        PHYSICAL EXAM:  GENERAL: NAD, well-developed  HEAD:  Atraumatic, Normocephalic  EYES: EOMI, PERRLA, conjunctiva and sclera clear  NECK: Supple, No JVD  CHEST/LUNG: Clear to auscultation bilaterally; No wheeze  HEART: Regular rate and rhythm; No murmurs, rubs, or gallops  ABDOMEN: Soft, Nontender, Nondistended; Bowel sounds present  EXTREMITIES:  2+ Peripheral Pulses, No clubbing, cyanosis, or edema  PSYCH: AAOx3  NEUROLOGY: non-focal  SKIN: No rashes or lesions    LABS:                        13.8   7.6   )-----------( 145      ( 12 Sep 2019 00:42 )             41.6     09-12    134<L>  |  101  |  24<H>  ----------------------------<  229<H>  3.9   |  16<L>  |  1.16    Ca    8.3<L>      12 Sep 2019 00:42  Phos  3.8     09-12  Mg     2.0     09-12    TPro  6.1  /  Alb  2.5<L>  /  TBili  1.1  /  DBili  x   /  AST  30  /  ALT  18  /  AlkPhos  115  09-12    PT/INR - ( 12 Sep 2019 00:42 )   PT: 16.2 sec;   INR: 1.41 ratio         PTT - ( 12 Sep 2019 00:42 )  PTT:36.5 sec          RADIOLOGY & ADDITIONAL TESTS:    Imaging Personally Reviewed:    Consultant(s) Notes Reviewed:      Care Discussed with Consultants/Other Providers: ACCEPT NOTE:  Supa Licona, PGY-1  Internal Medicine  Pager [phone] / [phone]    Patient is a 79y old  Male who presents with a chief complaint of AMS (12 Sep 2019 08:19)      SUBJECTIVE / OVERNIGHT EVENTS:      HPI:  79M pmh metastatic colon Ca, HTN, DM, hypothyroidism here for AMS w/ intubation for agitation. As per wife, she spoke to him last time before he decided to take nap in the evening, he c/o being tired (not new c/o since he was on chemo). Wife woke him up at 8 pm for dinner, he responded that he has to use restroom. Pt did not come back, she found him in bedroom in curling position in the bed and urinated on himself. He was not responding to voice, she realized that this could be from hypoglycemia (similar episodes recently too).  Patient has had previous episodes of hypoglycemia requiring ED visits and hospitalization so wife gave him glucose packets but did not get better so called EMS. Initially FSG per EMS 250s, first repeat in ED 99 and second 80. Pt was given an amp of D50 but was not following commands in ED, combative and flailing in bed. Patient moved to critical area. Attempted 2mg versed IV without improvement. Intubated for stat head CT. CT-head revealed no hemorrhage, midline shift, or hydrocephalus. CT angio of head and neck also performed, results pending. UA negative. Blood culture, urine cultures pending. Per wife, only difference this time from prior visits was agitation and not following commands. Patient does not have a history of seizures and has recently been choking on food, needed Heimlich 2x in past week. (10 Sep 2019 01:25)      MEDICATIONS  (STANDING):  dextrose 5% + sodium chloride 0.9%. 1000 milliLiter(s) (50 mL/Hr) IV Continuous <Continuous>  dextrose 5%. 1000 milliLiter(s) (50 mL/Hr) IV Continuous <Continuous>  dextrose 50% Injectable 12.5 Gram(s) IV Push once  dextrose 50% Injectable 25 Gram(s) IV Push once  dextrose 50% Injectable 25 Gram(s) IV Push once  heparin  Injectable 5000 Unit(s) SubCutaneous every 8 hours  insulin glargine Injectable (LANTUS) 8 Unit(s) SubCutaneous at bedtime  insulin lispro (HumaLOG) corrective regimen sliding scale   SubCutaneous every 6 hours  levothyroxine Injectable 100 MICROGram(s) IV Push at bedtime  metoprolol tartrate Injectable 5 milliGRAM(s) IV Push every 6 hours  multivitamin/minerals 1 Tablet(s) Oral daily  PARoxetine 20 milliGRAM(s) Oral daily  pyridoxine 100 milliGRAM(s) Oral daily    MEDICATIONS  (PRN):  dextrose 40% Gel 15 Gram(s) Oral once PRN Blood Glucose LESS THAN 70 milliGRAM(s)/deciliter  glucagon  Injectable 1 milliGRAM(s) IntraMuscular once PRN Glucose LESS THAN 70 milligrams/deciliter      Vital Signs Last 24 Hrs  T(C): 36.2 (12 Sep 2019 10:14), Max: 37.4 (12 Sep 2019 04:00)  T(F): 97.2 (12 Sep 2019 10:14), Max: 99.4 (12 Sep 2019 04:00)  HR: 62 (12 Sep 2019 10:14) (51 - 85)  BP: 157/70 (12 Sep 2019 10:14) (95/53 - 179/74)  BP(mean): 90 (12 Sep 2019 09:00) (71 - 123)  RR: 21 (12 Sep 2019 10:14) (17 - 33)  SpO2: 96% (12 Sep 2019 10:14) (96% - 100%)  CAPILLARY BLOOD GLUCOSE      POCT Blood Glucose.: 171 mg/dL (12 Sep 2019 05:22)  POCT Blood Glucose.: 195 mg/dL (11 Sep 2019 23:32)  POCT Blood Glucose.: 134 mg/dL (11 Sep 2019 16:52)  POCT Blood Glucose.: 193 mg/dL (11 Sep 2019 13:50)    I&O's Summary    11 Sep 2019 07:01  -  12 Sep 2019 07:00  --------------------------------------------------------  IN: 790.5 mL / OUT: 50 mL / NET: 740.5 mL    12 Sep 2019 07:01  -  12 Sep 2019 11:42  --------------------------------------------------------  IN: 0 mL / OUT: 10 mL / NET: -10 mL        PHYSICAL EXAM:  GENERAL: NAD, well-developed  HEAD:  Atraumatic, Normocephalic  EYES: EOMI, PERRLA, conjunctiva and sclera clear  NECK: Supple, No JVD  CHEST/LUNG: Clear to auscultation bilaterally; No wheeze  HEART: Regular rate and rhythm; No murmurs, rubs, or gallops  ABDOMEN: Soft, Nontender, Nondistended; Bowel sounds present  EXTREMITIES:  2+ Peripheral Pulses, No clubbing, cyanosis, or edema  PSYCH: AAOx3  NEUROLOGY: non-focal  SKIN: No rashes or lesions    LABS:                        13.8   7.6   )-----------( 145      ( 12 Sep 2019 00:42 )             41.6     09-12    134<L>  |  101  |  24<H>  ----------------------------<  229<H>  3.9   |  16<L>  |  1.16    Ca    8.3<L>      12 Sep 2019 00:42  Phos  3.8     09-12  Mg     2.0     09-12    TPro  6.1  /  Alb  2.5<L>  /  TBili  1.1  /  DBili  x   /  AST  30  /  ALT  18  /  AlkPhos  115  09-12    PT/INR - ( 12 Sep 2019 00:42 )   PT: 16.2 sec;   INR: 1.41 ratio         PTT - ( 12 Sep 2019 00:42 )  PTT:36.5 sec          RADIOLOGY & ADDITIONAL TESTS:    Imaging Personally Reviewed:    Consultant(s) Notes Reviewed:      Care Discussed with Consultants/Other Providers: ACCEPT NOTE:  Supa Licona, PGY-1  Internal Medicine  Pager [phone] / [phone]    Patient is a 79y old  Male who presents with a chief complaint of AMS (12 Sep 2019 08:19)    SUBJECTIVE / OVERNIGHT EVENTS:  Called to patient's bedside by nursing. During swallow study, patient was found to have word finding deficits and     HPI:  79M pmh metastatic colon Ca, HTN, DM, hypothyroidism here for AMS w/ intubation for agitation. As per wife, she spoke to him last time before he decided to take nap in the evening, he c/o being tired (not new c/o since he was on chemo). Wife woke him up at 8 pm for dinner, he responded that he has to use restroom. Pt did not come back, she found him in bedroom in curling position in the bed and urinated on himself. He was not responding to voice, she realized that this could be from hypoglycemia (similar episodes recently too).  Patient has had previous episodes of hypoglycemia requiring ED visits and hospitalization so wife gave him glucose packets but did not get better so called EMS. Initially FSG per EMS 250s, first repeat in ED 99 and second 80. Pt was given an amp of D50 but was not following commands in ED, combative and flailing in bed. Patient moved to critical area. Attempted 2mg versed IV without improvement. Intubated for stat head CT. CT-head revealed no hemorrhage, midline shift, or hydrocephalus. CT angio of head and neck also performed, results pending. UA negative. Blood culture, urine cultures pending. Per wife, only difference this time from prior visits was agitation and not following commands. Patient does not have a history of seizures and has recently been choking on food, needed Heimlich 2x in past week. (10 Sep 2019 01:25)      MEDICATIONS  (STANDING):  dextrose 5% + sodium chloride 0.9%. 1000 milliLiter(s) (50 mL/Hr) IV Continuous <Continuous>  dextrose 5%. 1000 milliLiter(s) (50 mL/Hr) IV Continuous <Continuous>  dextrose 50% Injectable 12.5 Gram(s) IV Push once  dextrose 50% Injectable 25 Gram(s) IV Push once  dextrose 50% Injectable 25 Gram(s) IV Push once  heparin  Injectable 5000 Unit(s) SubCutaneous every 8 hours  insulin glargine Injectable (LANTUS) 8 Unit(s) SubCutaneous at bedtime  insulin lispro (HumaLOG) corrective regimen sliding scale   SubCutaneous every 6 hours  levothyroxine Injectable 100 MICROGram(s) IV Push at bedtime  metoprolol tartrate Injectable 5 milliGRAM(s) IV Push every 6 hours  multivitamin/minerals 1 Tablet(s) Oral daily  PARoxetine 20 milliGRAM(s) Oral daily  pyridoxine 100 milliGRAM(s) Oral daily    MEDICATIONS  (PRN):  dextrose 40% Gel 15 Gram(s) Oral once PRN Blood Glucose LESS THAN 70 milliGRAM(s)/deciliter  glucagon  Injectable 1 milliGRAM(s) IntraMuscular once PRN Glucose LESS THAN 70 milligrams/deciliter      Vital Signs Last 24 Hrs  T(C): 36.2 (12 Sep 2019 10:14), Max: 37.4 (12 Sep 2019 04:00)  T(F): 97.2 (12 Sep 2019 10:14), Max: 99.4 (12 Sep 2019 04:00)  HR: 62 (12 Sep 2019 10:14) (51 - 85)  BP: 157/70 (12 Sep 2019 10:14) (95/53 - 179/74)  BP(mean): 90 (12 Sep 2019 09:00) (71 - 123)  RR: 21 (12 Sep 2019 10:14) (17 - 33)  SpO2: 96% (12 Sep 2019 10:14) (96% - 100%)  CAPILLARY BLOOD GLUCOSE      POCT Blood Glucose.: 171 mg/dL (12 Sep 2019 05:22)  POCT Blood Glucose.: 195 mg/dL (11 Sep 2019 23:32)  POCT Blood Glucose.: 134 mg/dL (11 Sep 2019 16:52)  POCT Blood Glucose.: 193 mg/dL (11 Sep 2019 13:50)    I&O's Summary    11 Sep 2019 07:01  -  12 Sep 2019 07:00  --------------------------------------------------------  IN: 790.5 mL / OUT: 50 mL / NET: 740.5 mL    12 Sep 2019 07:01  -  12 Sep 2019 11:42  --------------------------------------------------------  IN: 0 mL / OUT: 10 mL / NET: -10 mL        PHYSICAL EXAM:  GENERAL: NAD, well-developed  HEAD:  Atraumatic, Normocephalic  EYES: EOMI, PERRLA, conjunctiva and sclera clear  NECK: Supple, No JVD  CHEST/LUNG: Clear to auscultation bilaterally; No wheeze  HEART: Regular rate and rhythm; No murmurs, rubs, or gallops  ABDOMEN: Soft, Nontender, Nondistended; Bowel sounds present  EXTREMITIES:  2+ Peripheral Pulses, No clubbing, cyanosis, or edema  PSYCH: AAOx3  NEUROLOGY: non-focal  SKIN: No rashes or lesions    LABS:                        13.8   7.6   )-----------( 145      ( 12 Sep 2019 00:42 )             41.6     09-12    134<L>  |  101  |  24<H>  ----------------------------<  229<H>  3.9   |  16<L>  |  1.16    Ca    8.3<L>      12 Sep 2019 00:42  Phos  3.8     09-12  Mg     2.0     09-12    TPro  6.1  /  Alb  2.5<L>  /  TBili  1.1  /  DBili  x   /  AST  30  /  ALT  18  /  AlkPhos  115  09-12    PT/INR - ( 12 Sep 2019 00:42 )   PT: 16.2 sec;   INR: 1.41 ratio         PTT - ( 12 Sep 2019 00:42 )  PTT:36.5 sec          RADIOLOGY & ADDITIONAL TESTS:    Imaging Personally Reviewed:    Consultant(s) Notes Reviewed:      Care Discussed with Consultants/Other Providers: ACCEPT NOTE:  Supa Licona, PGY-1  Internal Medicine  Pager [phone] / [phone]    Patient is a 79y old  Male who presents with a chief complaint of AMS (12 Sep 2019 08:19)    SUBJECTIVE / OVERNIGHT EVENTS:  Called to patient's bedside by nursing. During swallow study, patient was found to have word finding deficits and occasional garbled speech. Patient's wife notes that this is similar to what happens when he has low blood sugar. Patient states that he feels fine and has no complaints. Denies nausea, vomiting, constipation, diarrhea, fevers, chills, chest pain, SOB.    HPI:  79M pmh metastatic colon Ca, HTN, DM, hypothyroidism here for AMS w/ intubation for agitation. As per wife, she spoke to him last time before he decided to take nap in the evening, he c/o being tired (not new c/o since he was on chemo). Wife woke him up at 8 pm for dinner, he responded that he has to use restroom. Pt did not come back, she found him in bedroom in curling position in the bed and urinated on himself. He was not responding to voice, she realized that this could be from hypoglycemia (similar episodes recently too).  Patient has had previous episodes of hypoglycemia requiring ED visits and hospitalization so wife gave him glucose packets but did not get better so called EMS. Initially FSG per EMS 250s, first repeat in ED 99 and second 80. Pt was given an amp of D50 but was not following commands in ED, combative and flailing in bed. Patient moved to critical area. Attempted 2mg versed IV without improvement. Intubated for stat head CT. CT-head revealed no hemorrhage, midline shift, or hydrocephalus. CT angio of head and neck also performed, results pending. UA negative. Blood culture, urine cultures pending. Per wife, only difference this time from prior visits was agitation and not following commands. Patient does not have a history of seizures and has recently been choking on food, needed Heimlich 2x in past week. (10 Sep 2019 01:25)      MEDICATIONS  (STANDING):  dextrose 5% + sodium chloride 0.9%. 1000 milliLiter(s) (50 mL/Hr) IV Continuous <Continuous>  dextrose 5%. 1000 milliLiter(s) (50 mL/Hr) IV Continuous <Continuous>  dextrose 50% Injectable 12.5 Gram(s) IV Push once  dextrose 50% Injectable 25 Gram(s) IV Push once  dextrose 50% Injectable 25 Gram(s) IV Push once  heparin  Injectable 5000 Unit(s) SubCutaneous every 8 hours  insulin glargine Injectable (LANTUS) 8 Unit(s) SubCutaneous at bedtime  insulin lispro (HumaLOG) corrective regimen sliding scale   SubCutaneous every 6 hours  levothyroxine Injectable 100 MICROGram(s) IV Push at bedtime  metoprolol tartrate Injectable 5 milliGRAM(s) IV Push every 6 hours  multivitamin/minerals 1 Tablet(s) Oral daily  PARoxetine 20 milliGRAM(s) Oral daily  pyridoxine 100 milliGRAM(s) Oral daily    MEDICATIONS  (PRN):  dextrose 40% Gel 15 Gram(s) Oral once PRN Blood Glucose LESS THAN 70 milliGRAM(s)/deciliter  glucagon  Injectable 1 milliGRAM(s) IntraMuscular once PRN Glucose LESS THAN 70 milligrams/deciliter      Vital Signs Last 24 Hrs  T(C): 36.2 (12 Sep 2019 10:14), Max: 37.4 (12 Sep 2019 04:00)  T(F): 97.2 (12 Sep 2019 10:14), Max: 99.4 (12 Sep 2019 04:00)  HR: 62 (12 Sep 2019 10:14) (51 - 85)  BP: 157/70 (12 Sep 2019 10:14) (95/53 - 179/74)  BP(mean): 90 (12 Sep 2019 09:00) (71 - 123)  RR: 21 (12 Sep 2019 10:14) (17 - 33)  SpO2: 96% (12 Sep 2019 10:14) (96% - 100%)  CAPILLARY BLOOD GLUCOSE      POCT Blood Glucose.: 171 mg/dL (12 Sep 2019 05:22)  POCT Blood Glucose.: 195 mg/dL (11 Sep 2019 23:32)  POCT Blood Glucose.: 134 mg/dL (11 Sep 2019 16:52)  POCT Blood Glucose.: 193 mg/dL (11 Sep 2019 13:50)    I&O's Summary    11 Sep 2019 07:01  -  12 Sep 2019 07:00  --------------------------------------------------------  IN: 790.5 mL / OUT: 50 mL / NET: 740.5 mL    12 Sep 2019 07:01  -  12 Sep 2019 11:42  --------------------------------------------------------  IN: 0 mL / OUT: 10 mL / NET: -10 mL      PHYSICAL EXAM:  GENERAL: NAD, well-developed  HEAD:  Atraumatic, Normocephalic, no facial droop  EYES: EOMI, PERRLA, conjunctiva and sclera clear  NECK: Supple, No JVD  CHEST/LUNG: Clear to auscultation bilaterally; No wheeze  HEART: Regular rate and rhythm; No murmurs, rubs, or gallops  ABDOMEN: Soft, Nontender, Nondistended; Bowel sounds present. Hard object felt under skin in lower abdomen (chemo pump)  EXTREMITIES:  2+ Peripheral Pulses, No clubbing, cyanosis, or edema  PSYCH: AAOx2 (date wrong by 1 week)  NEUROLOGY: non-focal. CN 2-12 intact. Sensation intact and equal in all extremities. Initially confuses right and left, later gets them correct. Strength 5/5 in all extremities and equal bilaterally.   SKIN: No rashes or lesions    LABS:                        13.8   7.6   )-----------( 145      ( 12 Sep 2019 00:42 )             41.6     09-12    134<L>  |  101  |  24<H>  ----------------------------<  229<H>  3.9   |  16<L>  |  1.16    Ca    8.3<L>      12 Sep 2019 00:42  Phos  3.8     09-12  Mg     2.0     09-12    TPro  6.1  /  Alb  2.5<L>  /  TBili  1.1  /  DBili  x   /  AST  30  /  ALT  18  /  AlkPhos  115  09-12    PT/INR - ( 12 Sep 2019 00:42 )   PT: 16.2 sec;   INR: 1.41 ratio         PTT - ( 12 Sep 2019 00:42 )  PTT:36.5 sec      RADIOLOGY & ADDITIONAL TESTS:    Imaging Personally Reviewed:  CT angio head and neck, MRI head, CT head non-con, chest X-ray.    Consultant(s) Notes Reviewed:    MICU   Bedside Swallow  Neurology    Care Discussed with Consultants/Other Providers:  Discussed with Neurology concern about anomia/aphasia. Suspect this is due to hypoglycemia. ACCEPT NOTE:  Supa Licona, PGY-1  Internal Medicine  Pager [phone] / [phone]    Patient is a 79y old  Male who presents with a chief complaint of AMS (12 Sep 2019 08:19)    SUBJECTIVE / OVERNIGHT EVENTS:  Called to patient's bedside by nursing. During swallow study, patient was found to have word finding deficits and occasional garbled speech. Patient's wife notes that this is similar to what happens when he has low blood sugar. Patient states that he feels fine and has no complaints. Denies nausea, vomiting, constipation, diarrhea, fevers, chills, chest pain, SOB.    HPI:  79M pmh metastatic colon Ca, HTN, DM, hypothyroidism here for AMS w/ intubation for agitation. As per wife, she spoke to him last time before he decided to take nap in the evening, he c/o being tired (not new c/o since he was on chemo). Wife woke him up at 8 pm for dinner, he responded that he has to use restroom. Pt did not come back, she found him in bedroom in curling position in the bed and urinated on himself. He was not responding to voice, she realized that this could be from hypoglycemia (similar episodes recently too).  Patient has had previous episodes of hypoglycemia requiring ED visits and hospitalization so wife gave him glucose packets but did not get better so called EMS. Initially FSG per EMS 250s, first repeat in ED 99 and second 80. Pt was given an amp of D50 but was not following commands in ED, combative and flailing in bed. Patient moved to critical area. Attempted 2mg versed IV without improvement. Intubated for stat head CT. CT-head revealed no hemorrhage, midline shift, or hydrocephalus. CT angio of head and neck also performed, results pending. UA negative. Blood culture, urine cultures pending. Per wife, only difference this time from prior visits was agitation and not following commands. Patient does not have a history of seizures and has recently been choking on food, needed Heimlich 2x in past week. (10 Sep 2019 01:25)    HPI:  79M pmh metastatic colon Ca, HTN, DM, hypothyroidism here for AMS w/ intubation for agitation. As per wife, she spoke to him last time before he decided to take nap in the evening, he c/o being tired (not new c/o since he was on chemo). Wife woke him up at 8 pm for dinner, he responded that he has to use restroom. Pt did not come back, she found him in bedroom in curling position in the bed and urinated on himself. He was not responding to voice, she realized that this could be from hypoglycemia (similar episodes recently too).  Patient has had previous episodes of hypoglycemia requiring ED visits and hospitalization so wife gave him glucose packets but did not get better so called EMS. Initially FSG per EMS 250s, first repeat in ED 99 and second 80. Pt was given an amp of D50 but was not following commands in ED, combative and flailing in bed. Patient moved to critical area. Attempted 2mg versed IV without improvement. Intubated for stat head CT. CT-head revealed no hemorrhage, midline shift, or hydrocephalus. CT angio of head and neck also performed, results pending. UA negative. Blood culture, urine cultures pending. Per wife, only difference this time from prior visits was agitation and not following commands. Patient does not have a history of seizures and has recently been choking on food, needed Heimlich 2x in past week.       PAST MEDICAL & SURGICAL HISTORY:  Hard of hearing  Neuropathy  Hypothyroidism  Type 2 diabetes mellitus  Hypertension  Colon cancer  History of bowel resection  History of cholecystectomy      Review of Systems:   CONSTITUTIONAL: No fever  EYES: No eye pain, visual disturbances  ENMT:  No difficulty hearing,   NECK: No pain or stiffness  RESPIRATORY: No cough, No shortness of breath  CARDIOVASCULAR: No chest pain, palpitations,  GASTROINTESTINAL: No abdominal or epigastric pain. No nausea, vomiting,  GENITOURINARY: No dysuria,   NEUROLOGICAL: No headaches, +lethargy  SKIN: No rashes  ENDOCRINE: No heat or cold intolerance  MUSCULOSKELETAL: No joint pain or swelling;   PSYCHIATRIC: No depression,   ALLERY AND IMMUNOLOGIC: No hives or eczema    Allergies    No Known Allergies    Social History: denies smoking or etoh abuse     FAMILY HISTORY:  FH: CVA (cerebrovascular accident)  FH: CHF (congestive heart failure)  FH: HTN (hypertension)      MEDICATIONS  (STANDING):  dextrose 5% + sodium chloride 0.9%. 1000 milliLiter(s) (50 mL/Hr) IV Continuous <Continuous>  dextrose 5%. 1000 milliLiter(s) (50 mL/Hr) IV Continuous <Continuous>  dextrose 50% Injectable 12.5 Gram(s) IV Push once  dextrose 50% Injectable 25 Gram(s) IV Push once  dextrose 50% Injectable 25 Gram(s) IV Push once  heparin  Injectable 5000 Unit(s) SubCutaneous every 8 hours  insulin glargine Injectable (LANTUS) 8 Unit(s) SubCutaneous at bedtime  insulin lispro (HumaLOG) corrective regimen sliding scale   SubCutaneous every 6 hours  levothyroxine Injectable 100 MICROGram(s) IV Push at bedtime  metoprolol tartrate Injectable 5 milliGRAM(s) IV Push every 6 hours  multivitamin/minerals 1 Tablet(s) Oral daily  PARoxetine 20 milliGRAM(s) Oral daily  pyridoxine 100 milliGRAM(s) Oral daily    MEDICATIONS  (PRN):  dextrose 40% Gel 15 Gram(s) Oral once PRN Blood Glucose LESS THAN 70 milliGRAM(s)/deciliter  glucagon  Injectable 1 milliGRAM(s) IntraMuscular once PRN Glucose LESS THAN 70 milligrams/deciliter      Vital Signs Last 24 Hrs  T(C): 36.2 (12 Sep 2019 10:14), Max: 37.4 (12 Sep 2019 04:00)  T(F): 97.2 (12 Sep 2019 10:14), Max: 99.4 (12 Sep 2019 04:00)  HR: 62 (12 Sep 2019 10:14) (51 - 85)  BP: 157/70 (12 Sep 2019 10:14) (95/53 - 179/74)  BP(mean): 90 (12 Sep 2019 09:00) (71 - 123)  RR: 21 (12 Sep 2019 10:14) (17 - 33)  SpO2: 96% (12 Sep 2019 10:14) (96% - 100%)  CAPILLARY BLOOD GLUCOSE      POCT Blood Glucose.: 171 mg/dL (12 Sep 2019 05:22)  POCT Blood Glucose.: 195 mg/dL (11 Sep 2019 23:32)  POCT Blood Glucose.: 134 mg/dL (11 Sep 2019 16:52)  POCT Blood Glucose.: 193 mg/dL (11 Sep 2019 13:50)    I&O's Summary    11 Sep 2019 07:01  -  12 Sep 2019 07:00  --------------------------------------------------------  IN: 790.5 mL / OUT: 50 mL / NET: 740.5 mL    12 Sep 2019 07:01  -  12 Sep 2019 11:42  --------------------------------------------------------  IN: 0 mL / OUT: 10 mL / NET: -10 mL      PHYSICAL EXAM:  GENERAL: NAD, well-developed  HEAD:  Atraumatic, Normocephalic, no facial droop  EYES: EOMI, PERRLA, conjunctiva and sclera clear  NECK: Supple, No JVD  CHEST/LUNG: Clear to auscultation bilaterally; No wheeze  HEART: Regular rate and rhythm; No murmurs, rubs, or gallops  ABDOMEN: Soft, Nontender, Nondistended; Bowel sounds present. Hard object felt under skin in lower abdomen (chemo pump)  EXTREMITIES:  2+ Peripheral Pulses, No clubbing, cyanosis, or edema  PSYCH: AAOx2 (date wrong by 1 week)  NEUROLOGY: non-focal. CN 2-12 intact. Sensation intact and equal in all extremities. Initially confuses right and left, later gets them correct. Strength 5/5 in all extremities and equal bilaterally.   SKIN: No rashes or lesions    LABS:                        13.8   7.6   )-----------( 145      ( 12 Sep 2019 00:42 )             41.6     09-12    134<L>  |  101  |  24<H>  ----------------------------<  229<H>  3.9   |  16<L>  |  1.16    Ca    8.3<L>      12 Sep 2019 00:42  Phos  3.8     09-12  Mg     2.0     09-12    TPro  6.1  /  Alb  2.5<L>  /  TBili  1.1  /  DBili  x   /  AST  30  /  ALT  18  /  AlkPhos  115  09-12    PT/INR - ( 12 Sep 2019 00:42 )   PT: 16.2 sec;   INR: 1.41 ratio         PTT - ( 12 Sep 2019 00:42 )  PTT:36.5 sec      RADIOLOGY & ADDITIONAL TESTS:    Imaging Personally Reviewed:  CT angio head and neck, MRI head, CT head non-con - no acute infarct or hemorrhage, chest X-ray.    Consultant(s) Notes Reviewed:    MICU   Bedside Swallow  Neurology    Care Discussed with Consultants/Other Providers:  Discussed with Neurology concern about anomia/aphasia. Suspect this is due to hypoglycemia.

## 2019-09-12 NOTE — SWALLOW BEDSIDE ASSESSMENT ADULT - COMMENTS
Pt agitated upon arrival. ED attempted 2mg versed IV without improvement. Decision made to intubate for airway protection and to go to CT.   CT Brain (9/9/19): no evidence of acute intracranial hemorrhage, midline shift, or hydrocephalus  CT Angio Head/Neck (9/9/19): Normal CTA of the head/neck. Dominant L vertebral artery. No significant carotid stenosis. Normal intracranial circulation. Aberrant R subclavian artery. R maxillary sinus opacification w/ calcifications which may represent inspissated mucus.  XR Chest (9/9/19): Right hilar opacity, consistent with admission 7/2019.  EKG (9/9/19): Sinus rhythm w/ short FL. L axis deviation. L nevtrivular hypertrophy w/ QRS widening. T wave abnormality, consider lateral ischemia, abnormal ECG.  XR Chest (9/10/19): Clear lungs. NG tube in place.  XR Abdomen (9/10/19): Surgical clips in the right hemiabdomen. Baclofen pump device. Enteric tube with tip in the stomach. Nonobstructive bowel gas pattern. Contrast seen in the bladder. Pt agitated upon arrival. ED attempted 2mg versed IV without improvement. Decision made to intubate for airway protection and to go to CT.   CT Brain (9/9/19): no evidence of acute intracranial hemorrhage, midline shift, or hydrocephalus  CT Angio Head/Neck (9/9/19): Normal CTA of the head/neck. Dominant L vertebral artery. No significant carotid stenosis. Normal intracranial circulation. Aberrant R subclavian artery. R maxillary sinus opacification w/ calcifications which may represent inspissated mucus.  XR Chest (9/9/19): Right hilar opacity, consistent with admission 7/2019.  EKG (9/9/19): Sinus rhythm w/ short NH. L axis deviation. L nevtrivular hypertrophy w/ QRS widening. T wave abnormality, consider lateral ischemia, abnormal ECG.  EEG (9/9/19): Suggestive of severe diffuse or multifocal cerebral dysfunction. Severe diffuse slowing with frontal predominance at times semiperiodic with a triphasic morphology. No evidence for epileptiform abnormalities or seizures.  XR Chest (9/10/19): Clear lungs. NG tube in place.  XR Abdomen (9/10/19): Surgical clips in the right hemiabdomen. Baclofen pump device. Enteric tube with tip in the stomach. Nonobstructive bowel gas pattern. Contrast seen in the bladder.  Lumbar puncture (9/10/19): Unable to obtain CSF sample.  Extubation (9/11/19): Tolerated well (no SOB; rate/depth regular). Placed on 50% 02 via aerosol mask. Frequent cough noted.  Dietician (9/11/19): Wife noticed Pt has had taste changes over ~6 months (has been on chemo before w/out taste changes). Pt placed NPO pending swallow evaluation. Pt agitated upon arrival. ED attempted 2mg versed IV without improvement. Decision made to intubate for airway protection and to go to CT.   CT Brain (9/9/19): no evidence of acute intracranial hemorrhage, midline shift, or hydrocephalus  CT Angio Head/Neck (9/9/19): Normal CTA of the head/neck. Dominant L vertebral artery. No significant carotid stenosis. Normal intracranial circulation. Aberrant R subclavian artery. R maxillary sinus opacification w/ calcifications which may represent inspissated mucus.  XR Chest (9/9/19): Right hilar opacity, consistent with admission 7/2019.  EKG (9/9/19): Sinus rhythm w/ short SC. L axis deviation. L nevtrivular hypertrophy w/ QRS widening. T wave abnormality, consider lateral ischemia, abnormal ECG.  EEG (9/9/19): Suggestive of severe diffuse or multifocal cerebral dysfunction. Severe diffuse slowing with frontal predominance at times semiperiodic with a triphasic morphology. No evidence for epileptiform abnormalities or seizures.  XR Chest (9/10/19): Clear lungs. NG tube in place.  XR Abdomen (9/10/19): Surgical clips in the right hemiabdomen. Baclofen pump device. Enteric tube with tip in the stomach. Nonobstructive bowel gas pattern. Contrast seen in the bladder.  Lumbar puncture (9/10/19): Unable to obtain CSF sample.  Extubation (9/11/19): Tolerated well (no SOB; rate/depth regular). Placed on 50% 02 via aerosol mask. Frequent cough noted.  Dietician (9/11/19): Wife noticed Pt has had taste changes over ~6 months (has been on chemo before w/out taste changes). Pt placed NPO pending swallow evaluation.  Pt. transferred to floor.  MRI (9/11/19): Limited exam 2/2 motion, age appropriate changes, no acute infarcts or mass. ? Repeat MRI pending for 9/12/19  Endo: Recommended to start IV Synthroid and Lantus 8 units qhs, full consult to follow.  Blood and urine cultures negative to date.  Pt on protonix PPX.

## 2019-09-12 NOTE — CONSULT NOTE ADULT - ASSESSMENT
79 M PMH metastatic colon Ca, HTN, DM2 with hypoglycemia, hypothyroidism, acromegaly on Sandostatin, here for AMS w/ intubation for agitation and urgent CT head, now s/p extubation, also with hypoglycemia as outpatient in setting of controlled DM2, also with elevated TSH in setting of known hypothyroidism

## 2019-09-12 NOTE — PROGRESS NOTE ADULT - PROBLEM SELECTOR PLAN 6
DVT: SQH  Diet - NPO pending swallow eval  Dispo - PT eval On 137.5 mcg levothyroxine at home. TSH 14.1 with T4 1.2, possibly sick euthyroid.  - Endocrinology following, appreciate recs

## 2019-09-13 DIAGNOSIS — N17.9 ACUTE KIDNEY FAILURE, UNSPECIFIED: ICD-10-CM

## 2019-09-13 LAB
ANION GAP SERPL CALC-SCNC: 13 MMOL/L — SIGNIFICANT CHANGE UP (ref 5–17)
BASOPHILS # BLD AUTO: 0.02 K/UL — SIGNIFICANT CHANGE UP (ref 0–0.2)
BASOPHILS NFR BLD AUTO: 0.3 % — SIGNIFICANT CHANGE UP (ref 0–2)
BUN SERPL-MCNC: 29 MG/DL — HIGH (ref 7–23)
CALCIUM SERPL-MCNC: 8.9 MG/DL — SIGNIFICANT CHANGE UP (ref 8.4–10.5)
CHLORIDE SERPL-SCNC: 102 MMOL/L — SIGNIFICANT CHANGE UP (ref 96–108)
CO2 SERPL-SCNC: 22 MMOL/L — SIGNIFICANT CHANGE UP (ref 22–31)
CORTIS AM PEAK SERPL-MCNC: 14.2 UG/DL — SIGNIFICANT CHANGE UP (ref 6–18.4)
CREAT ?TM UR-MCNC: 163 MG/DL — SIGNIFICANT CHANGE UP
CREAT SERPL-MCNC: 1.55 MG/DL — HIGH (ref 0.5–1.3)
EOSINOPHIL # BLD AUTO: 0.12 K/UL — SIGNIFICANT CHANGE UP (ref 0–0.5)
EOSINOPHIL NFR BLD AUTO: 1.8 % — SIGNIFICANT CHANGE UP (ref 0–6)
GLUCOSE BLDC GLUCOMTR-MCNC: 114 MG/DL — HIGH (ref 70–99)
GLUCOSE BLDC GLUCOMTR-MCNC: 141 MG/DL — HIGH (ref 70–99)
GLUCOSE BLDC GLUCOMTR-MCNC: 164 MG/DL — HIGH (ref 70–99)
GLUCOSE BLDC GLUCOMTR-MCNC: 211 MG/DL — HIGH (ref 70–99)
GLUCOSE BLDC GLUCOMTR-MCNC: 237 MG/DL — HIGH (ref 70–99)
GLUCOSE SERPL-MCNC: 151 MG/DL — HIGH (ref 70–99)
HBA1C BLD-MCNC: 7 % — HIGH (ref 4–5.6)
HCT VFR BLD CALC: 34.9 % — LOW (ref 39–50)
HGB BLD-MCNC: 12.1 G/DL — LOW (ref 13–17)
IMM GRANULOCYTES NFR BLD AUTO: 0.3 % — SIGNIFICANT CHANGE UP (ref 0–1.5)
LYMPHOCYTES # BLD AUTO: 0.73 K/UL — LOW (ref 1–3.3)
LYMPHOCYTES # BLD AUTO: 11.2 % — LOW (ref 13–44)
MAGNESIUM SERPL-MCNC: 2 MG/DL — SIGNIFICANT CHANGE UP (ref 1.6–2.6)
MCHC RBC-ENTMCNC: 34.7 GM/DL — SIGNIFICANT CHANGE UP (ref 32–36)
MCHC RBC-ENTMCNC: 36.4 PG — HIGH (ref 27–34)
MCV RBC AUTO: 105.1 FL — HIGH (ref 80–100)
MONOCYTES # BLD AUTO: 0.73 K/UL — SIGNIFICANT CHANGE UP (ref 0–0.9)
MONOCYTES NFR BLD AUTO: 11.2 % — SIGNIFICANT CHANGE UP (ref 2–14)
NEUTROPHILS # BLD AUTO: 4.9 K/UL — SIGNIFICANT CHANGE UP (ref 1.8–7.4)
NEUTROPHILS NFR BLD AUTO: 75.2 % — SIGNIFICANT CHANGE UP (ref 43–77)
PHOSPHATE SERPL-MCNC: 3 MG/DL — SIGNIFICANT CHANGE UP (ref 2.5–4.5)
PLATELET # BLD AUTO: 147 K/UL — LOW (ref 150–400)
POTASSIUM SERPL-MCNC: 3.7 MMOL/L — SIGNIFICANT CHANGE UP (ref 3.5–5.3)
POTASSIUM SERPL-SCNC: 3.7 MMOL/L — SIGNIFICANT CHANGE UP (ref 3.5–5.3)
RBC # BLD: 3.32 M/UL — LOW (ref 4.2–5.8)
RBC # FLD: 15.9 % — HIGH (ref 10.3–14.5)
SODIUM SERPL-SCNC: 137 MMOL/L — SIGNIFICANT CHANGE UP (ref 135–145)
SODIUM UR-SCNC: 24 MMOL/L — SIGNIFICANT CHANGE UP
TSH SERPL-MCNC: 18.2 UIU/ML — HIGH (ref 0.27–4.2)
WBC # BLD: 6.52 K/UL — SIGNIFICANT CHANGE UP (ref 3.8–10.5)
WBC # FLD AUTO: 6.52 K/UL — SIGNIFICANT CHANGE UP (ref 3.8–10.5)

## 2019-09-13 PROCEDURE — 99232 SBSQ HOSP IP/OBS MODERATE 35: CPT | Mod: GC

## 2019-09-13 PROCEDURE — 99233 SBSQ HOSP IP/OBS HIGH 50: CPT

## 2019-09-13 RX ORDER — LANOLIN ALCOHOL/MO/W.PET/CERES
3 CREAM (GRAM) TOPICAL AT BEDTIME
Refills: 0 | Status: DISCONTINUED | OUTPATIENT
Start: 2019-09-13 | End: 2019-09-18

## 2019-09-13 RX ORDER — DOCUSATE SODIUM 100 MG
100 CAPSULE ORAL
Refills: 0 | Status: DISCONTINUED | OUTPATIENT
Start: 2019-09-13 | End: 2019-09-16

## 2019-09-13 RX ORDER — SENNA PLUS 8.6 MG/1
2 TABLET ORAL AT BEDTIME
Refills: 0 | Status: DISCONTINUED | OUTPATIENT
Start: 2019-09-13 | End: 2019-09-18

## 2019-09-13 RX ORDER — ENOXAPARIN SODIUM 100 MG/ML
40 INJECTION SUBCUTANEOUS DAILY
Refills: 0 | Status: DISCONTINUED | OUTPATIENT
Start: 2019-09-13 | End: 2019-09-18

## 2019-09-13 RX ORDER — INSULIN GLARGINE 100 [IU]/ML
6 INJECTION, SOLUTION SUBCUTANEOUS AT BEDTIME
Refills: 0 | Status: DISCONTINUED | OUTPATIENT
Start: 2019-09-13 | End: 2019-09-16

## 2019-09-13 RX ORDER — TAMSULOSIN HYDROCHLORIDE 0.4 MG/1
0.4 CAPSULE ORAL AT BEDTIME
Refills: 0 | Status: DISCONTINUED | OUTPATIENT
Start: 2019-09-13 | End: 2019-09-17

## 2019-09-13 RX ADMIN — Medication 100 MILLIGRAM(S): at 12:49

## 2019-09-13 RX ADMIN — CARVEDILOL PHOSPHATE 25 MILLIGRAM(S): 80 CAPSULE, EXTENDED RELEASE ORAL at 17:57

## 2019-09-13 RX ADMIN — CARVEDILOL PHOSPHATE 25 MILLIGRAM(S): 80 CAPSULE, EXTENDED RELEASE ORAL at 05:36

## 2019-09-13 RX ADMIN — INSULIN GLARGINE 6 UNIT(S): 100 INJECTION, SOLUTION SUBCUTANEOUS at 22:29

## 2019-09-13 RX ADMIN — Medication 3 MILLIGRAM(S): at 21:31

## 2019-09-13 RX ADMIN — Medication 2 UNIT(S): at 08:03

## 2019-09-13 RX ADMIN — SENNA PLUS 2 TABLET(S): 8.6 TABLET ORAL at 21:31

## 2019-09-13 RX ADMIN — Medication 2 UNIT(S): at 12:46

## 2019-09-13 RX ADMIN — Medication 0.5 MILLIGRAM(S): at 21:31

## 2019-09-13 RX ADMIN — Medication 2 UNIT(S): at 17:56

## 2019-09-13 RX ADMIN — Medication 3 MILLIGRAM(S): at 02:29

## 2019-09-13 RX ADMIN — Medication 137 MICROGRAM(S): at 05:37

## 2019-09-13 RX ADMIN — Medication 1: at 12:48

## 2019-09-13 RX ADMIN — LOSARTAN POTASSIUM 100 MILLIGRAM(S): 100 TABLET, FILM COATED ORAL at 05:36

## 2019-09-13 RX ADMIN — Medication 20 MILLIGRAM(S): at 17:57

## 2019-09-13 RX ADMIN — Medication 1 TABLET(S): at 12:46

## 2019-09-13 RX ADMIN — TAMSULOSIN HYDROCHLORIDE 0.4 MILLIGRAM(S): 0.4 CAPSULE ORAL at 21:31

## 2019-09-13 NOTE — PROGRESS NOTE ADULT - PROBLEM SELECTOR PLAN 1
- hypoglycemia now resolved  - would not aim for tight blood glucose control - goal BG mid 100's to low 200's  - decrease Lantus to 6 units qhs  - continue with Humalog 2 units TID  - discussed with family at bedside that given low insulin requirements, patient does not require basal bolus insulin on discharge, as long as c-peptide is present. Will check c-peptide in AM. If adequate, can consider dc on basal + oral or oral agents only.   - to follow up with private endocrinologist Dr. Fraire

## 2019-09-13 NOTE — PROGRESS NOTE ADULT - ASSESSMENT
79M pmh metastatic colon Ca, HTN, DM w/ frequent ED visits for hypoglycemia, hypothyroidism here for AMS w/ intubation for agitation and to allow CT head. Ddx for encephalopathy includes seizures, metastatic disease, hypoglycemia, sepsis (no overt source). 79M pmh metastatic colon Ca, HTN, DM w/ frequent ED visits for hypoglycemia, hypothyroidism here for AMS w/ intubation for agitation and to allow CT head. Most likely 2/2 hypoglycemia.

## 2019-09-13 NOTE — OCCUPATIONAL THERAPY INITIAL EVALUATION ADULT - PERSONAL SAFETY AND JUDGMENT, REHAB EVAL
intact/Pt currently with good insight  however Pt presents with mild confusion at times (response to glucose level?) but has awareness regarding this problem

## 2019-09-13 NOTE — OCCUPATIONAL THERAPY INITIAL EVALUATION ADULT - NS ASR FOLLOW COMMAND OT EVAL
PT demonstrated appropriate problem solving skills/able to follow single-step instructions/100% of the time

## 2019-09-13 NOTE — PROGRESS NOTE ADULT - PROBLEM SELECTOR PLAN 5
Required Heimlich maneuver 2 times in past month  - Passed swallow study, able to take PO meds and meals without restriction Metastatic to liver and lung. s/p Xeroda and Avastin  - Currently stable  - Holding Chemo meds

## 2019-09-13 NOTE — OCCUPATIONAL THERAPY INITIAL EVALUATION ADULT - DIAGNOSIS, OT EVAL
Patient presents with decreased balance and fluctuating confusion impacting ability to perform ADLs and functional mobility

## 2019-09-13 NOTE — PHYSICAL THERAPY INITIAL EVALUATION ADULT - LIVES WITH, PROFILE
Pt lives with spouse in a co-op. Pt reports 3-4 stairs to enter with bilateral railings, (+)elevator to second floor apt.

## 2019-09-13 NOTE — PROGRESS NOTE ADULT - ATTENDING COMMENTS
Delma Song MD   Pager # [phone]  On evenings and weekends, please call the office at [phone] or page endocrine fellow on call. Please note that this patient may be followed by different provider tomorrow. If no answer, contact the office.

## 2019-09-13 NOTE — PROGRESS NOTE ADULT - PROBLEM SELECTOR PLAN 8
DVT: SQH  Diet - CC/DASH  Dispo - PT eval - on sandostatin monthly. Per endocrine will hold while admitted  - wife to notify due date for next dose   - Endocrine f/u

## 2019-09-13 NOTE — PHYSICAL THERAPY INITIAL EVALUATION ADULT - PRECAUTIONS/LIMITATIONS, REHAB EVAL
Pertinent history continued... Pertinent history continued... He was not responding to voice, she realized that this could be from hypoglycemia (similar episodes recently too).  Patient has had previous episodes of hypoglycemia requiring ED visits and hospitalization so wife gave him glucose packets but did not get better so called EMS. Initially FSG per EMS 250s, first repeat in ED 99 and second 80. Pt was given an amp of D50 but was not following commands in ED, combative and flailing in bed. Patient moved to critical area. Attempted 2mg versed IV without improvement. Intubated for stat head CT. CT-head revealed no hemorrhage, midline shift, or hydrocephalus. CT angio of head and neck also performed, results pending. UA negative. Blood culture, urine cultures pending. Per wife, only difference this time from prior visits was agitation and not following commands. Patient does not have a history of seizures and has recently been choking on food, needed Heimlich 2x in past week. Pertinent history continued... He was not responding to voice, she realized that this could be from hypoglycemia (similar episodes recently too).  Patient has had previous episodes of hypoglycemia requiring ED visits and hospitalization so wife gave him glucose packets but did not get better so called EMS. Initially FSG per EMS 250s, first repeat in ED 99 and second 80. Pt was given an amp of D50 but was not following commands in ED, combative and flailing in bed. Patient moved to critical area. Attempted 2mg versed IV without improvement. Intubated for stat head CT. CT-head revealed no hemorrhage, midline shift, or hydrocephalus. CT angio of head and neck also performed, results pending. UA negative. Blood culture, urine cultures pending. Per wife, only difference this time from prior visits was agitation and not following commands. Patient does not have a history of seizures and has recently been choking on food, needed Heimlich 2x in past week.   Patient admitted to MICU, required intubation for concern for inability to protect airway. Intubated 9/9, extubated 9/11. Mental status improved. EEG w/o evidence of seizures. CT, MRI negative for cause. Pertinent history continued... He was not responding to voice, she realized that this could be from hypoglycemia (similar episodes recently too).  Patient has had previous episodes of hypoglycemia requiring ED visits and hospitalization so wife gave him glucose packets but did not get better so called EMS. Initially FSG per EMS 250s, first repeat in ED 99 and second 80. Pt was given an amp of D50 but was not following commands in ED, combative and flailing in bed. Patient moved to critical area. Attempted 2mg versed IV without improvement. Intubated for stat head CT. CT-head revealed no hemorrhage, midline shift, or hydrocephalus. CT angio of head and neck also performed, results pending. UA negative. Blood culture, urine cultures pending. Per wife, only difference this time from prior visits was agitation and not following commands. Patient does not have a history of seizures and has recently been choking on food, needed Heimlich 2x in past week.   Patient admitted to MICU, required intubation for concern for inability to protect airway. Intubated 9/9, extubated 9/11. Mental status improved. EEG w/o evidence of seizures. CT, MRI negative for cause./fall precautions

## 2019-09-13 NOTE — OCCUPATIONAL THERAPY INITIAL EVALUATION ADULT - PRECAUTIONS/LIMITATIONS, REHAB EVAL
Pertinent history continued... He was not responding to voice, she realized that this could be from hypoglycemia (similar episodes recently too).  Patient has had previous episodes of hypoglycemia requiring ED visits and hospitalization so wife gave him glucose packets but did not get better so called EMS. Initially FSG per EMS 250s, first repeat in ED 99 and second 80. Pt was given an amp of D50 but was not following commands in ED, combative and flailing in bed. Patient moved to critical area. Attempted 2mg versed IV without improvement. Intubated for stat head CT. CT-head revealed no hemorrhage, midline shift, or hydrocephalus. CT angio of head and neck also performed, results pending. UA negative. Blood culture, urine cultures pending. Per wife, only difference this time from prior visits was agitation and not following commands. Patient does not have a history of seizures and has recently been choking on food, needed Heimlich 2x in past week.   Patient admitted to MICU, required intubation for concern for inability to protect airway. Intubated 9/9, extubated 9/11. Mental status improved. EEG w/o evidence of seizures. CT, MRI negative for cause.; fall precautions

## 2019-09-13 NOTE — PROGRESS NOTE ADULT - PROBLEM SELECTOR PLAN 6
On 137.5 mcg levothyroxine at home. TSH 14.1 with T4 1.2, possibly sick euthyroid.  - Endocrinology following, appreciate recs On losartan 100 mg, carvedilol 25 mg, amlodipine 2.5 mg, lasix 20 mg at home  - Now able to take PO meds  - Start losartan and carvedilol, hold amlodipine and lasix

## 2019-09-13 NOTE — PROGRESS NOTE ADULT - PROBLEM SELECTOR PLAN 1
Patient admitted to MICU, required intubation for concern for inability to protect airway. Intubated 9/9, extubated 9/11. Mental status improved. EEG w/o evidence of seizures. CT, MRI negative for cause. FSGs were in 200s by EMS, though this was after glucose administration. Likely etiology is hypoglycemia, as patient has had multiple episodes of hypoglycemia causing encephalopathy and hospital presentation. Patient may have labile blood sugars. Received 8u Lantus last night and was NPO today, possibly leading to episode with anomia/aphasia, though FSG was 105. Lantus 14u at home. Avoid hypoglycemia  - Endocrinology following, appreciate recs  - 8u Lantus tonight  - 2 u Humalog at meals  - Goal   - Monitor neuro status. BL neuro exam nonfocal.  - AM Cortisol Patient admitted to MICU, required intubation for concern for inability to protect airway. Intubated 9/9, extubated 9/11. Mental status improved. EEG w/o evidence of seizures. CT, MRI negative for cause. FSGs were in 200s by EMS, though this was after glucose administration. Likely etiology is hypoglycemia, as patient has had multiple episodes of hypoglycemia causing encephalopathy and hospital presentation. Patient may have labile blood sugars. Receiving 8u Lantus last night and was NPO today, possibly leading to episode with anomia/aphasia, though FSG was 105. Lantus 14u at home. Avoid hypoglycemia  - Endocrinology following, appreciate recs  - 8u Lantus tonight  - 2 u Humalog at meals  - Goal   - Monitor neuro status. BL neuro exam nonfocal.  - AM Cortisol Patient admitted to MICU, required intubation for concern for inability to protect airway. Intubated 9/9, extubated 9/11. Mental status improved. EEG w/o evidence of seizures. CT, MRI negative for cause. FSGs were in 200s by EMS, though this was after glucose administration. Likely etiology is hypoglycemia, as patient has had multiple episodes of hypoglycemia causing encephalopathy and hospital presentation. Patient may have labile blood sugars. Receiving 8u Lantus last night. Lantus 14u at home. Avoid hypoglycemia. Became confused and hallucinated overnight.  - Endocrinology following, appreciate recs  - 8u Lantus tonight  - 2 u Humalog at meals  - Goal   - Monitor neuro status. BL neuro exam nonfocal.  - AM Cortisol negative  - On Ativan 1.5 mg QHS at home, will give 1mg.

## 2019-09-13 NOTE — PHYSICAL THERAPY INITIAL EVALUATION ADULT - PERTINENT HX OF CURRENT PROBLEM, REHAB EVAL
Pt is an 80 y/o M pmh metastatic colon Ca, HTN, DM, hypothyroidism here for AMS w/ intubation for agitation. As per wife, she spoke to him last time before he decided to take nap in the evening, he c/o being tired (not new c/o since he was on chemo). Wife woke him up at 8 pm for dinner, he responded that he has to use restroom. Pt did not come back, she found him in bedroom in curling position in the bed and urinated on himself.   Continued...

## 2019-09-13 NOTE — OCCUPATIONAL THERAPY INITIAL EVALUATION ADULT - LIVES WITH, PROFILE
spouse/Pt lives with spouse in a co-op. Pt reports 3-4 stairs to enter with bilateral railings, (+)elevator to second floor apt.

## 2019-09-13 NOTE — PROGRESS NOTE ADULT - PROBLEM SELECTOR PLAN 7
on sandostatin monthly   wife to notify due date for next dose   Endocrine f/u Required Heimlich maneuver 2 times in past month  - Passed swallow study, able to take PO meds and meals without restriction

## 2019-09-13 NOTE — PROGRESS NOTE ADULT - SUBJECTIVE AND OBJECTIVE BOX
Chief Complaint: f/u DM2, hypothyroidism    History:  Patient seen at bedside with his family and his hospitalist. He is tolerating po and does not have nausea or vomiting. He was due for his Sandostatin dose on Monday.    As per family, he has been taking his Levothyroxine with food in the morning. They think he has been taking his medication every day.    At home, he previously had labile BG values. He used to be on Metformin but apparently it was stopped because his BG was high in the 300's. However, he has lost a lot of weight recently.      MEDICATIONS  (STANDING):  carvedilol 25 milliGRAM(s) Oral every 12 hours  dextrose 5% + sodium chloride 0.9%. 1000 milliLiter(s) (50 mL/Hr) IV Continuous <Continuous>  dextrose 5%. 1000 milliLiter(s) (50 mL/Hr) IV Continuous <Continuous>  dextrose 50% Injectable 12.5 Gram(s) IV Push once  dextrose 50% Injectable 25 Gram(s) IV Push once  dextrose 50% Injectable 25 Gram(s) IV Push once  insulin glargine Injectable (LANTUS) 8 Unit(s) SubCutaneous at bedtime  insulin lispro (HumaLOG) corrective regimen sliding scale   SubCutaneous three times a day before meals  insulin lispro (HumaLOG) corrective regimen sliding scale   SubCutaneous at bedtime  insulin lispro Injectable (HumaLOG) 2 Unit(s) SubCutaneous three times a day before meals  levothyroxine 137 MICROGram(s) Oral daily  melatonin 3 milliGRAM(s) Oral at bedtime  multivitamin/minerals 1 Tablet(s) Oral daily  PARoxetine 20 milliGRAM(s) Oral daily  pyridoxine 100 milliGRAM(s) Oral daily  senna 2 Tablet(s) Oral at bedtime    MEDICATIONS  (PRN):  dextrose 40% Gel 15 Gram(s) Oral once PRN Blood Glucose LESS THAN 70 milliGRAM(s)/deciliter  glucagon  Injectable 1 milliGRAM(s) IntraMuscular once PRN Glucose LESS THAN 70 milligrams/deciliter  glucagon  Injectable 1 milliGRAM(s) IntraMuscular once PRN Glucose LESS THAN 70 milligrams/deciliter      Allergies  No Known Allergies    PHYSICAL EXAM:  VITALS: T(C): 36.3 (09-13-19 @ 16:21)  T(F): 97.4 (09-13-19 @ 16:21), Max: 98.8 (09-12-19 @ 19:40)  HR: 66 (09-13-19 @ 16:21) (55 - 68)  BP: 170/77 (09-13-19 @ 16:21) (109/64 - 170/77)  RR:  (18 - 20)  SpO2:  (95% - 99%)  Wt(kg): --  GENERAL: NAD, well-developed  EYES: No proptosis, anicteric  HEENT:  Atraumatic, Normocephalic  GI: Soft, nontender, non distended    POCT Blood Glucose.: 164 mg/dL (09-13-19 @ 12:04)  POCT Blood Glucose.: 114 mg/dL (09-13-19 @ 08:01)  POCT Blood Glucose.: 211 mg/dL (09-13-19 @ 02:04)  POCT Blood Glucose.: 230 mg/dL (09-12-19 @ 20:53)  POCT Blood Glucose.: 249 mg/dL (09-12-19 @ 17:19)  POCT Blood Glucose.: 172 mg/dL (09-12-19 @ 14:16)  POCT Blood Glucose.: 93 mg/dL (09-12-19 @ 13:28)  POCT Blood Glucose.: 105 mg/dL (09-12-19 @ 12:25)  POCT Blood Glucose.: 171 mg/dL (09-12-19 @ 05:22)  POCT Blood Glucose.: 195 mg/dL (09-11-19 @ 23:32)  POCT Blood Glucose.: 134 mg/dL (09-11-19 @ 16:52)  POCT Blood Glucose.: 193 mg/dL (09-11-19 @ 13:50)  POCT Blood Glucose.: 175 mg/dL (09-11-19 @ 06:00)  POCT Blood Glucose.: 180 mg/dL (09-10-19 @ 23:57)  POCT Blood Glucose.: 200 mg/dL (09-10-19 @ 21:25)      09-13    137  |  102  |  29<H>  ----------------------------<  151<H>  3.7   |  22  |  1.55<H>    EGFR if : 48<L>  EGFR if non : 42<L>    Ca    8.9      09-13  Mg     2.0     09-13  Phos  3.0     09-13    TPro  6.1  /  Alb  2.5<L>  /  TBili  1.1  /  DBili  x   /  AST  30  /  ALT  18  /  AlkPhos  115  09-12          Thyroid Function Tests:  09-13 @ 10:06 TSH 18.20 FreeT4 -- T3 -- Anti TPO -- Anti Thyroglobulin Ab -- TSI --  09-11 @ 08:32 TSH 14.10 FreeT4 1.2 T3 -- Anti TPO -- Anti Thyroglobulin Ab -- TSI --      Hemoglobin A1C, Whole Blood: 7.0 % <H> [4.0 - 5.6] (09-13-19 @ 10:09)  Hemoglobin A1C, Whole Blood: 6.7 % <H> [4.0 - 5.6] (07-18-19 @ 09:50)

## 2019-09-13 NOTE — PROGRESS NOTE ADULT - PROBLEM SELECTOR PLAN 4
On losartan 100 mg, carvedilol 25 mg, amlodipine 2.5 mg, lasix 20 mg at home  - Now able to take PO meds  - Start losartan and carvedilol, hold amlodipine and lasix On 137.5 mcg levothyroxine at home. TSH 14.1 with T4 1.2, possibly sick euthyroid. Repeat TSH 18.2  - Endocrinology following, appreciate recs  - Home dose levothyroxine

## 2019-09-13 NOTE — PROGRESS NOTE ADULT - PROBLEM SELECTOR PLAN 3
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 s/p Xeroda and Avastin  - Currently stable  - Holding Chemo meds New problem. Patient NPO yesterday, pre-renal is most likely etiology. Not on new nephrotoxic medications.  - PVR  - Urine Lytes  - Small bolus IVF New problem. Patient NPO yesterday, pre-renal is most likely etiology. Not on new nephrotoxic medications. Possibly obstructive/post-renal as well  -  - straight cath  - Urine Lytes  - Small bolus IVF New problem. Patient NPO yesterday, pre-renal is most likely etiology. Not on new nephrotoxic medications. Possibly obstructive/post-renal as well  - - straight cath for now - likely multifactorial - immobility, constipation   monitor urine output - bowel regimen, increase mobility - d/w family if persistent retention will need to place parekh  - Urine Lytes  - Small bolus IVF

## 2019-09-13 NOTE — PROGRESS NOTE ADULT - SUBJECTIVE AND OBJECTIVE BOX
Supa Licona, PGY-1  Internal Medicine  Pager [phone] / [phone]    Patient is a 79y old  Male who presents with a chief complaint of Altered mental status (12 Sep 2019 11:41)      SUBJECTIVE / OVERNIGHT EVENTS:    MEDICATIONS  (STANDING):  carvedilol 25 milliGRAM(s) Oral every 12 hours  dextrose 5% + sodium chloride 0.9%. 1000 milliLiter(s) (50 mL/Hr) IV Continuous <Continuous>  dextrose 5%. 1000 milliLiter(s) (50 mL/Hr) IV Continuous <Continuous>  dextrose 5%. 1000 milliLiter(s) (50 mL/Hr) IV Continuous <Continuous>  dextrose 50% Injectable 12.5 Gram(s) IV Push once  dextrose 50% Injectable 25 Gram(s) IV Push once  dextrose 50% Injectable 25 Gram(s) IV Push once  dextrose 50% Injectable 12.5 Gram(s) IV Push once  dextrose 50% Injectable 25 Gram(s) IV Push once  dextrose 50% Injectable 25 Gram(s) IV Push once  insulin glargine Injectable (LANTUS) 8 Unit(s) SubCutaneous at bedtime  insulin lispro (HumaLOG) corrective regimen sliding scale   SubCutaneous three times a day before meals  insulin lispro (HumaLOG) corrective regimen sliding scale   SubCutaneous at bedtime  insulin lispro Injectable (HumaLOG) 2 Unit(s) SubCutaneous three times a day before meals  levothyroxine 137 MICROGram(s) Oral daily  losartan 100 milliGRAM(s) Oral daily  melatonin 3 milliGRAM(s) Oral at bedtime  multivitamin/minerals 1 Tablet(s) Oral daily  PARoxetine 20 milliGRAM(s) Oral daily  pyridoxine 100 milliGRAM(s) Oral daily    MEDICATIONS  (PRN):  dextrose 40% Gel 15 Gram(s) Oral once PRN Blood Glucose LESS THAN 70 milliGRAM(s)/deciliter  dextrose 40% Gel 15 Gram(s) Oral once PRN Blood Glucose LESS THAN 70 milliGRAM(s)/deciliter  glucagon  Injectable 1 milliGRAM(s) IntraMuscular once PRN Glucose LESS THAN 70 milligrams/deciliter  glucagon  Injectable 1 milliGRAM(s) IntraMuscular once PRN Glucose LESS THAN 70 milligrams/deciliter      CAPILLARY BLOOD GLUCOSE      POCT Blood Glucose.: 211 mg/dL (13 Sep 2019 02:04)  POCT Blood Glucose.: 230 mg/dL (12 Sep 2019 20:53)  POCT Blood Glucose.: 249 mg/dL (12 Sep 2019 17:19)  POCT Blood Glucose.: 172 mg/dL (12 Sep 2019 14:16)  POCT Blood Glucose.: 93 mg/dL (12 Sep 2019 13:28)  POCT Blood Glucose.: 105 mg/dL (12 Sep 2019 12:25)    I&O's Summary    11 Sep 2019 07:01  -  12 Sep 2019 07:00  --------------------------------------------------------  IN: 790.5 mL / OUT: 50 mL / NET: 740.5 mL    12 Sep 2019 07:01  -  13 Sep 2019 05:59  --------------------------------------------------------  IN: 250 mL / OUT: 10 mL / NET: 240 mL        PHYSICAL EXAM:  Vital Signs Last 24 Hrs  T(C): 36.7 (09-13-19 @ 04:34)  T(F): 98.1 (09-13-19 @ 04:34), Max: 98.8 (09-12-19 @ 19:40)  HR: 60 (09-13-19 @ 04:34) (58 - 76)  BP: 148/73 (09-13-19 @ 04:34)  BP(mean): 90 (09-12-19 @ 09:00) (90 - 123)  RR: 18 (09-13-19 @ 04:34) (18 - 31)  SpO2: 98% (09-13-19 @ 04:34) (95% - 100%)  Wt(kg): --    09-11 @ 07:01  -  09-12 @ 07:00  --------------------------------------------------------  IN: 790.5 mL / OUT: 50 mL / NET: 740.5 mL    09-12 @ 07:01  -  09-13 @ 05:59  --------------------------------------------------------  IN: 250 mL / OUT: 10 mL / NET: 240 mL      Constitutional: NAD, awake and alert  EYES: EOMI  ENT:  Normal Hearing, no tonsillar exudates   Neck: Soft and supple , no thyromegaly   Respiratory: Breath sounds are clear bilaterally, No wheezing, rales or rhonchi  Cardiovascular: S1 and S2, regular rate and rhythm, no Murmurs, gallops or rubs, no JVD,    Gastrointestinal: Bowel Sounds present, soft, nontender, nondistended, no guarding, no rebound  Extremities: No cyanosis or clubbing; warm to touch  Vascular: 2+ peripheral pulses lower ex  Neurological: No focal deficits, CN II-XII intact bilaterally, sensation to light touch intact in all extremities, gait intact. Pupils are equally reactive to light and symmetrical in size.   Musculoskeletal: 5/5 strength b/l upper and lower extremities; no joint swelling.  Skin: No rashes  Psych: no depression or anhedonia, AAOx3  HEME: no bruises, no nose bleeds      LABS:                        13.8   7.6   )-----------( 145      ( 12 Sep 2019 00:42 )             41.6     09-12    134<L>  |  101  |  24<H>  ----------------------------<  229<H>  3.9   |  16<L>  |  1.16    Ca    8.3<L>      12 Sep 2019 00:42  Phos  3.8     09-12  Mg     2.0     09-12    TPro  6.1  /  Alb  2.5<L>  /  TBili  1.1  /  DBili  x   /  AST  30  /  ALT  18  /  AlkPhos  115  09-12    PT/INR - ( 12 Sep 2019 00:42 )   PT: 16.2 sec;   INR: 1.41 ratio         PTT - ( 12 Sep 2019 00:42 )  PTT:36.5 sec          RADIOLOGY & ADDITIONAL TESTS:    Imaging Personally Reviewed:    Consultant(s) Notes Reviewed:      Care Discussed with Consultants/Other Providers: Supa Licona, PGY-1  Internal Medicine  Pager [phone] / [phone]    Patient is a 79y old  Male who presents with a chief complaint of Altered mental status (12 Sep 2019 11:41)    SUBJECTIVE / OVERNIGHT EVENTS:  Patient experienced hallucinations overnight. Adamant that there were two teenage boys in his room talking, but not responding to him. Does not recognize these as hallucinations, thinks they were real.   Denies nausea, vomiting, constipation, diarrhea, fevers, chills, chest pain, SOB.    MEDICATIONS  (STANDING):  carvedilol 25 milliGRAM(s) Oral every 12 hours  dextrose 5% + sodium chloride 0.9%. 1000 milliLiter(s) (50 mL/Hr) IV Continuous <Continuous>  dextrose 5%. 1000 milliLiter(s) (50 mL/Hr) IV Continuous <Continuous>  dextrose 5%. 1000 milliLiter(s) (50 mL/Hr) IV Continuous <Continuous>  dextrose 50% Injectable 12.5 Gram(s) IV Push once  dextrose 50% Injectable 25 Gram(s) IV Push once  dextrose 50% Injectable 25 Gram(s) IV Push once  dextrose 50% Injectable 12.5 Gram(s) IV Push once  dextrose 50% Injectable 25 Gram(s) IV Push once  dextrose 50% Injectable 25 Gram(s) IV Push once  insulin glargine Injectable (LANTUS) 8 Unit(s) SubCutaneous at bedtime  insulin lispro (HumaLOG) corrective regimen sliding scale   SubCutaneous three times a day before meals  insulin lispro (HumaLOG) corrective regimen sliding scale   SubCutaneous at bedtime  insulin lispro Injectable (HumaLOG) 2 Unit(s) SubCutaneous three times a day before meals  levothyroxine 137 MICROGram(s) Oral daily  losartan 100 milliGRAM(s) Oral daily  melatonin 3 milliGRAM(s) Oral at bedtime  multivitamin/minerals 1 Tablet(s) Oral daily  PARoxetine 20 milliGRAM(s) Oral daily  pyridoxine 100 milliGRAM(s) Oral daily    MEDICATIONS  (PRN):  dextrose 40% Gel 15 Gram(s) Oral once PRN Blood Glucose LESS THAN 70 milliGRAM(s)/deciliter  dextrose 40% Gel 15 Gram(s) Oral once PRN Blood Glucose LESS THAN 70 milliGRAM(s)/deciliter  glucagon  Injectable 1 milliGRAM(s) IntraMuscular once PRN Glucose LESS THAN 70 milligrams/deciliter  glucagon  Injectable 1 milliGRAM(s) IntraMuscular once PRN Glucose LESS THAN 70 milligrams/deciliter      CAPILLARY BLOOD GLUCOSE      POCT Blood Glucose.: 211 mg/dL (13 Sep 2019 02:04)  POCT Blood Glucose.: 230 mg/dL (12 Sep 2019 20:53)  POCT Blood Glucose.: 249 mg/dL (12 Sep 2019 17:19)  POCT Blood Glucose.: 172 mg/dL (12 Sep 2019 14:16)  POCT Blood Glucose.: 93 mg/dL (12 Sep 2019 13:28)  POCT Blood Glucose.: 105 mg/dL (12 Sep 2019 12:25)    I&O's Summary    11 Sep 2019 07:01  -  12 Sep 2019 07:00  --------------------------------------------------------  IN: 790.5 mL / OUT: 50 mL / NET: 740.5 mL    12 Sep 2019 07:01  -  13 Sep 2019 05:59  --------------------------------------------------------  IN: 250 mL / OUT: 10 mL / NET: 240 mL        PHYSICAL EXAM:  Vital Signs Last 24 Hrs  T(C): 36.7 (09-13-19 @ 04:34)  T(F): 98.1 (09-13-19 @ 04:34), Max: 98.8 (09-12-19 @ 19:40)  HR: 60 (09-13-19 @ 04:34) (58 - 76)  BP: 148/73 (09-13-19 @ 04:34)  BP(mean): 90 (09-12-19 @ 09:00) (90 - 123)  RR: 18 (09-13-19 @ 04:34) (18 - 31)  SpO2: 98% (09-13-19 @ 04:34) (95% - 100%)  Wt(kg): --    09-11 @ 07:01  -  09-12 @ 07:00  --------------------------------------------------------  IN: 790.5 mL / OUT: 50 mL / NET: 740.5 mL    09-12 @ 07:01  -  09-13 @ 05:59  --------------------------------------------------------  IN: 250 mL / OUT: 10 mL / NET: 240 mL      Constitutional: NAD, awake and alert  EYES: EOMI  ENT:  Normal Hearing, no tonsillar exudates   Neck: Soft and supple , no thyromegaly   Respiratory: Breath sounds are clear bilaterally, No wheezing, rales or rhonchi  Cardiovascular: S1 and S2, regular rate and rhythm, no Murmurs, gallops or rubs, no JVD,    Gastrointestinal: Bowel Sounds present, soft, nontender, nondistended, no guarding, no rebound  Extremities: No cyanosis or clubbing; warm to touch  Vascular: 2+ peripheral pulses lower ex  Neurological: No focal deficits, CN II-XII intact bilaterally, sensation to light touch intact in all extremities, gait intact. Pupils are equally reactive to light and symmetrical in size.   Musculoskeletal: 5/5 strength b/l upper and lower extremities; no joint swelling.  Skin: No rashes  Psych: no depression or anhedonia, AAOx3  HEME: no bruises, no nose bleeds      LABS:                        13.8   7.6   )-----------( 145      ( 12 Sep 2019 00:42 )             41.6     09-12    134<L>  |  101  |  24<H>  ----------------------------<  229<H>  3.9   |  16<L>  |  1.16    Ca    8.3<L>      12 Sep 2019 00:42  Phos  3.8     09-12  Mg     2.0     09-12    TPro  6.1  /  Alb  2.5<L>  /  TBili  1.1  /  DBili  x   /  AST  30  /  ALT  18  /  AlkPhos  115  09-12    PT/INR - ( 12 Sep 2019 00:42 )   PT: 16.2 sec;   INR: 1.41 ratio         PTT - ( 12 Sep 2019 00:42 )  PTT:36.5 sec          RADIOLOGY & ADDITIONAL TESTS:    Imaging Personally Reviewed:    Consultant(s) Notes Reviewed:      Care Discussed with Consultants/Other Providers: Supa Licona, PGY-1  Internal Medicine  Pager [phone] / [phone]    Patient is a 79y old  Male who presents with a chief complaint of Altered mental status (12 Sep 2019 11:41)    SUBJECTIVE / OVERNIGHT EVENTS:  Patient experienced hallucinations overnight. Adamant that there were two teenage boys in his room talking, but not responding to him. Does not recognize these as hallucinations, thinks they were real. BG at this time was 211   Denies nausea, vomiting, constipation, diarrhea, fevers, chills, chest pain, SOB.    MEDICATIONS  (STANDING):  carvedilol 25 milliGRAM(s) Oral every 12 hours  dextrose 5% + sodium chloride 0.9%. 1000 milliLiter(s) (50 mL/Hr) IV Continuous <Continuous>  dextrose 5%. 1000 milliLiter(s) (50 mL/Hr) IV Continuous <Continuous>  dextrose 5%. 1000 milliLiter(s) (50 mL/Hr) IV Continuous <Continuous>  dextrose 50% Injectable 12.5 Gram(s) IV Push once  dextrose 50% Injectable 25 Gram(s) IV Push once  dextrose 50% Injectable 25 Gram(s) IV Push once  dextrose 50% Injectable 12.5 Gram(s) IV Push once  dextrose 50% Injectable 25 Gram(s) IV Push once  dextrose 50% Injectable 25 Gram(s) IV Push once  insulin glargine Injectable (LANTUS) 8 Unit(s) SubCutaneous at bedtime  insulin lispro (HumaLOG) corrective regimen sliding scale   SubCutaneous three times a day before meals  insulin lispro (HumaLOG) corrective regimen sliding scale   SubCutaneous at bedtime  insulin lispro Injectable (HumaLOG) 2 Unit(s) SubCutaneous three times a day before meals  levothyroxine 137 MICROGram(s) Oral daily  losartan 100 milliGRAM(s) Oral daily  melatonin 3 milliGRAM(s) Oral at bedtime  multivitamin/minerals 1 Tablet(s) Oral daily  PARoxetine 20 milliGRAM(s) Oral daily  pyridoxine 100 milliGRAM(s) Oral daily    MEDICATIONS  (PRN):  dextrose 40% Gel 15 Gram(s) Oral once PRN Blood Glucose LESS THAN 70 milliGRAM(s)/deciliter  dextrose 40% Gel 15 Gram(s) Oral once PRN Blood Glucose LESS THAN 70 milliGRAM(s)/deciliter  glucagon  Injectable 1 milliGRAM(s) IntraMuscular once PRN Glucose LESS THAN 70 milligrams/deciliter  glucagon  Injectable 1 milliGRAM(s) IntraMuscular once PRN Glucose LESS THAN 70 milligrams/deciliter      CAPILLARY BLOOD GLUCOSE      POCT Blood Glucose.: 211 mg/dL (13 Sep 2019 02:04)  POCT Blood Glucose.: 230 mg/dL (12 Sep 2019 20:53)  POCT Blood Glucose.: 249 mg/dL (12 Sep 2019 17:19)  POCT Blood Glucose.: 172 mg/dL (12 Sep 2019 14:16)  POCT Blood Glucose.: 93 mg/dL (12 Sep 2019 13:28)  POCT Blood Glucose.: 105 mg/dL (12 Sep 2019 12:25)    I&O's Summary    11 Sep 2019 07:01  -  12 Sep 2019 07:00  --------------------------------------------------------  IN: 790.5 mL / OUT: 50 mL / NET: 740.5 mL    12 Sep 2019 07:01  -  13 Sep 2019 05:59  --------------------------------------------------------  IN: 250 mL / OUT: 10 mL / NET: 240 mL        PHYSICAL EXAM:  Vital Signs Last 24 Hrs  T(C): 36.7 (09-13-19 @ 04:34)  T(F): 98.1 (09-13-19 @ 04:34), Max: 98.8 (09-12-19 @ 19:40)  HR: 60 (09-13-19 @ 04:34) (58 - 76)  BP: 148/73 (09-13-19 @ 04:34)  BP(mean): 90 (09-12-19 @ 09:00) (90 - 123)  RR: 18 (09-13-19 @ 04:34) (18 - 31)  SpO2: 98% (09-13-19 @ 04:34) (95% - 100%)  Wt(kg): --    09-11 @ 07:01  -  09-12 @ 07:00  --------------------------------------------------------  IN: 790.5 mL / OUT: 50 mL / NET: 740.5 mL    09-12 @ 07:01  -  09-13 @ 05:59  --------------------------------------------------------  IN: 250 mL / OUT: 10 mL / NET: 240 mL    GENERAL: NAD, well-developed  HEAD:  Atraumatic, Normocephalic, no facial droop  EYES: EOMI, PERRLA, conjunctiva and sclera clear  NECK: Supple  CHEST/LUNG: Clear to auscultation bilaterally; No wheeze  HEART: Regular rate and rhythm; No murmurs, rubs, or gallops  ABDOMEN: Soft, Nontender, Nondistended; Bowel sounds present. Hard object felt under skin in lower abdomen (chemo pump)  EXTREMITIES:  2+ Peripheral Pulses, No clubbing, cyanosis, or edema  PSYCH: AAOx2 (date wrong by 1 week)  NEUROLOGY: non-focal. CN 2-12 intact. Sensation intact and equal in all extremities. Strength 5/5 in all extremities and equal bilaterally.   SKIN: No rashes or lesions    LABS:                          12.1   6.52  )-----------( 147      ( 13 Sep 2019 10:09 )             34.9     09-13    137  |  102  |  29<H>  ----------------------------<  151<H>  3.7   |  22  |  1.55<H>    Ca    8.9      13 Sep 2019 07:08  Phos  3.0     09-13  Mg     2.0     09-13    TPro  6.1  /  Alb  2.5<L>  /  TBili  1.1  /  DBili  x   /  AST  30  /  ALT  18  /  AlkPhos  115  09-12      RADIOLOGY & ADDITIONAL TESTS:    Imaging Personally Reviewed:    Consultant(s) Notes Reviewed:      Care Discussed with Consultants/Other Providers: Supa Licona, PGY-1  Internal Medicine  Pager [phone] / [phone]    Patient is a 79y old  Male who presents with a chief complaint of Altered mental status (12 Sep 2019 11:41)    SUBJECTIVE / OVERNIGHT EVENTS:  Patient experienced hallucinations overnight. Adamant that there were two teenage boys in his room talking, but not responding to him. Does not recognize these as hallucinations, thinks they were real. BG at this time was 211.  Denies nausea, vomiting, constipation, diarrhea, fevers, chills, chest pain, SOB.    MEDICATIONS  (STANDING):  carvedilol 25 milliGRAM(s) Oral every 12 hours  dextrose 5% + sodium chloride 0.9%. 1000 milliLiter(s) (50 mL/Hr) IV Continuous <Continuous>  dextrose 5%. 1000 milliLiter(s) (50 mL/Hr) IV Continuous <Continuous>  dextrose 5%. 1000 milliLiter(s) (50 mL/Hr) IV Continuous <Continuous>  dextrose 50% Injectable 12.5 Gram(s) IV Push once  dextrose 50% Injectable 25 Gram(s) IV Push once  dextrose 50% Injectable 25 Gram(s) IV Push once  dextrose 50% Injectable 12.5 Gram(s) IV Push once  dextrose 50% Injectable 25 Gram(s) IV Push once  dextrose 50% Injectable 25 Gram(s) IV Push once  insulin glargine Injectable (LANTUS) 8 Unit(s) SubCutaneous at bedtime  insulin lispro (HumaLOG) corrective regimen sliding scale   SubCutaneous three times a day before meals  insulin lispro (HumaLOG) corrective regimen sliding scale   SubCutaneous at bedtime  insulin lispro Injectable (HumaLOG) 2 Unit(s) SubCutaneous three times a day before meals  levothyroxine 137 MICROGram(s) Oral daily  losartan 100 milliGRAM(s) Oral daily  melatonin 3 milliGRAM(s) Oral at bedtime  multivitamin/minerals 1 Tablet(s) Oral daily  PARoxetine 20 milliGRAM(s) Oral daily  pyridoxine 100 milliGRAM(s) Oral daily    MEDICATIONS  (PRN):  dextrose 40% Gel 15 Gram(s) Oral once PRN Blood Glucose LESS THAN 70 milliGRAM(s)/deciliter  dextrose 40% Gel 15 Gram(s) Oral once PRN Blood Glucose LESS THAN 70 milliGRAM(s)/deciliter  glucagon  Injectable 1 milliGRAM(s) IntraMuscular once PRN Glucose LESS THAN 70 milligrams/deciliter  glucagon  Injectable 1 milliGRAM(s) IntraMuscular once PRN Glucose LESS THAN 70 milligrams/deciliter      CAPILLARY BLOOD GLUCOSE      POCT Blood Glucose.: 211 mg/dL (13 Sep 2019 02:04)  POCT Blood Glucose.: 230 mg/dL (12 Sep 2019 20:53)  POCT Blood Glucose.: 249 mg/dL (12 Sep 2019 17:19)  POCT Blood Glucose.: 172 mg/dL (12 Sep 2019 14:16)  POCT Blood Glucose.: 93 mg/dL (12 Sep 2019 13:28)  POCT Blood Glucose.: 105 mg/dL (12 Sep 2019 12:25)    I&O's Summary    11 Sep 2019 07:01  -  12 Sep 2019 07:00  --------------------------------------------------------  IN: 790.5 mL / OUT: 50 mL / NET: 740.5 mL    12 Sep 2019 07:01  -  13 Sep 2019 05:59  --------------------------------------------------------  IN: 250 mL / OUT: 10 mL / NET: 240 mL        PHYSICAL EXAM:  Vital Signs Last 24 Hrs  T(C): 36.7 (09-13-19 @ 04:34)  T(F): 98.1 (09-13-19 @ 04:34), Max: 98.8 (09-12-19 @ 19:40)  HR: 60 (09-13-19 @ 04:34) (58 - 76)  BP: 148/73 (09-13-19 @ 04:34)  BP(mean): 90 (09-12-19 @ 09:00) (90 - 123)  RR: 18 (09-13-19 @ 04:34) (18 - 31)  SpO2: 98% (09-13-19 @ 04:34) (95% - 100%)  Wt(kg): --    09-11 @ 07:01  -  09-12 @ 07:00  --------------------------------------------------------  IN: 790.5 mL / OUT: 50 mL / NET: 740.5 mL    09-12 @ 07:01  -  09-13 @ 05:59  --------------------------------------------------------  IN: 250 mL / OUT: 10 mL / NET: 240 mL    GENERAL: NAD, well-developed  HEAD:  Atraumatic, Normocephalic, no facial droop  EYES: EOMI, PERRLA, conjunctiva and sclera clear  NECK: Supple  CHEST/LUNG: Clear to auscultation bilaterally; No wheeze  HEART: Regular rate and rhythm; No murmurs, rubs, or gallops  ABDOMEN: Soft, Nontender, Nondistended; Bowel sounds present. Hard object felt under skin in lower abdomen (chemo pump)  EXTREMITIES:  2+ Peripheral Pulses, No clubbing, cyanosis, or edema  PSYCH: AAOx2 (date wrong by 1 week)  NEUROLOGY: non-focal. CN 2-12 intact. Sensation intact and equal in all extremities. Strength 5/5 in all extremities and equal bilaterally.   SKIN: No rashes or lesions    LABS:                          12.1   6.52  )-----------( 147      ( 13 Sep 2019 10:09 )             34.9     09-13    137  |  102  |  29<H>  ----------------------------<  151<H>  3.7   |  22  |  1.55<H>    Ca    8.9      13 Sep 2019 07:08  Phos  3.0     09-13  Mg     2.0     09-13    TPro  6.1  /  Alb  2.5<L>  /  TBili  1.1  /  DBili  x   /  AST  30  /  ALT  18  /  AlkPhos  115  09-12      RADIOLOGY & ADDITIONAL TESTS:    Imaging Personally Reviewed:    Consultant(s) Notes Reviewed:    Endocrinology    Care Discussed with Consultants/Other Providers:

## 2019-09-13 NOTE — OCCUPATIONAL THERAPY INITIAL EVALUATION ADULT - ADDITIONAL COMMENTS
·Pt reports that he has a tub shower in bathroom with grab bars, no chair. He reports that he has a rolling walker in the home but does not use it   He states that PTA he was independent in adl's.

## 2019-09-13 NOTE — PROGRESS NOTE ADULT - PROBLEM SELECTOR PLAN 2
- repeat TSH 18.2  - as per family, he has been taking incorrectly at home  - given acute illness on presentation (with intubation and MICU admission), it is possible that his elevated TSH may be due to recovery from non-thyroidal illness. Therefore, would continue with LT4 137 mcg daily.

## 2019-09-13 NOTE — OCCUPATIONAL THERAPY INITIAL EVALUATION ADULT - ANTICIPATED DISCHARGE DISPOSITION, OT EVAL
no needs/Pt would benefit from shower chair in bathroom, Pt will require 24/7 supervision sec to fluctuating cognitive status.

## 2019-09-14 DIAGNOSIS — Z79.899 OTHER LONG TERM (CURRENT) DRUG THERAPY: ICD-10-CM

## 2019-09-14 LAB
ANION GAP SERPL CALC-SCNC: 10 MMOL/L — SIGNIFICANT CHANGE UP (ref 5–17)
BUN SERPL-MCNC: 30 MG/DL — HIGH (ref 7–23)
C PEPTIDE SERPL-MCNC: 0.2 NG/ML — LOW (ref 1.1–4.4)
CALCIUM SERPL-MCNC: 8.9 MG/DL — SIGNIFICANT CHANGE UP (ref 8.4–10.5)
CHLORIDE SERPL-SCNC: 103 MMOL/L — SIGNIFICANT CHANGE UP (ref 96–108)
CO2 SERPL-SCNC: 23 MMOL/L — SIGNIFICANT CHANGE UP (ref 22–31)
CREAT SERPL-MCNC: 1.36 MG/DL — HIGH (ref 0.5–1.3)
GLUCOSE BLDC GLUCOMTR-MCNC: 123 MG/DL — HIGH (ref 70–99)
GLUCOSE BLDC GLUCOMTR-MCNC: 138 MG/DL — HIGH (ref 70–99)
GLUCOSE BLDC GLUCOMTR-MCNC: 152 MG/DL — HIGH (ref 70–99)
GLUCOSE BLDC GLUCOMTR-MCNC: 187 MG/DL — HIGH (ref 70–99)
GLUCOSE SERPL-MCNC: 212 MG/DL — HIGH (ref 70–99)
HCT VFR BLD CALC: 33.9 % — LOW (ref 39–50)
HGB BLD-MCNC: 11.7 G/DL — LOW (ref 13–17)
MAGNESIUM SERPL-MCNC: 1.9 MG/DL — SIGNIFICANT CHANGE UP (ref 1.6–2.6)
MCHC RBC-ENTMCNC: 34.5 GM/DL — SIGNIFICANT CHANGE UP (ref 32–36)
MCHC RBC-ENTMCNC: 36.9 PG — HIGH (ref 27–34)
MCV RBC AUTO: 106.9 FL — HIGH (ref 80–100)
PHOSPHATE SERPL-MCNC: 2.6 MG/DL — SIGNIFICANT CHANGE UP (ref 2.5–4.5)
PLATELET # BLD AUTO: 127 K/UL — LOW (ref 150–400)
POTASSIUM SERPL-MCNC: 3.3 MMOL/L — LOW (ref 3.5–5.3)
POTASSIUM SERPL-SCNC: 3.3 MMOL/L — LOW (ref 3.5–5.3)
RBC # BLD: 3.17 M/UL — LOW (ref 4.2–5.8)
RBC # FLD: 15.6 % — HIGH (ref 10.3–14.5)
SODIUM SERPL-SCNC: 136 MMOL/L — SIGNIFICANT CHANGE UP (ref 135–145)
WBC # BLD: 5.51 K/UL — SIGNIFICANT CHANGE UP (ref 3.8–10.5)
WBC # FLD AUTO: 5.51 K/UL — SIGNIFICANT CHANGE UP (ref 3.8–10.5)

## 2019-09-14 PROCEDURE — 99233 SBSQ HOSP IP/OBS HIGH 50: CPT | Mod: GC

## 2019-09-14 RX ORDER — POTASSIUM CHLORIDE 20 MEQ
40 PACKET (EA) ORAL ONCE
Refills: 0 | Status: COMPLETED | OUTPATIENT
Start: 2019-09-14 | End: 2019-09-14

## 2019-09-14 RX ORDER — LEVOTHYROXINE SODIUM 125 MCG
150 TABLET ORAL DAILY
Refills: 0 | Status: DISCONTINUED | OUTPATIENT
Start: 2019-09-14 | End: 2019-09-18

## 2019-09-14 RX ORDER — ATORVASTATIN CALCIUM 80 MG/1
10 TABLET, FILM COATED ORAL AT BEDTIME
Refills: 0 | Status: DISCONTINUED | OUTPATIENT
Start: 2019-09-14 | End: 2019-09-18

## 2019-09-14 RX ORDER — POLYETHYLENE GLYCOL 3350 17 G/17G
17 POWDER, FOR SOLUTION ORAL DAILY
Refills: 0 | Status: DISCONTINUED | OUTPATIENT
Start: 2019-09-14 | End: 2019-09-18

## 2019-09-14 RX ORDER — LEVOTHYROXINE SODIUM 125 MCG
1 TABLET ORAL
Qty: 0 | Refills: 0 | DISCHARGE

## 2019-09-14 RX ADMIN — Medication 137 MICROGRAM(S): at 05:10

## 2019-09-14 RX ADMIN — Medication 100 MILLIGRAM(S): at 05:10

## 2019-09-14 RX ADMIN — TAMSULOSIN HYDROCHLORIDE 0.4 MILLIGRAM(S): 0.4 CAPSULE ORAL at 22:11

## 2019-09-14 RX ADMIN — Medication 20 MILLIGRAM(S): at 11:12

## 2019-09-14 RX ADMIN — Medication 2 UNIT(S): at 12:17

## 2019-09-14 RX ADMIN — SENNA PLUS 2 TABLET(S): 8.6 TABLET ORAL at 22:10

## 2019-09-14 RX ADMIN — CARVEDILOL PHOSPHATE 25 MILLIGRAM(S): 80 CAPSULE, EXTENDED RELEASE ORAL at 05:10

## 2019-09-14 RX ADMIN — Medication 1 TABLET(S): at 11:12

## 2019-09-14 RX ADMIN — ENOXAPARIN SODIUM 40 MILLIGRAM(S): 100 INJECTION SUBCUTANEOUS at 11:12

## 2019-09-14 RX ADMIN — CARVEDILOL PHOSPHATE 25 MILLIGRAM(S): 80 CAPSULE, EXTENDED RELEASE ORAL at 17:24

## 2019-09-14 RX ADMIN — Medication 100 MILLIGRAM(S): at 11:12

## 2019-09-14 RX ADMIN — Medication 3 MILLIGRAM(S): at 22:11

## 2019-09-14 RX ADMIN — INSULIN GLARGINE 6 UNIT(S): 100 INJECTION, SOLUTION SUBCUTANEOUS at 22:09

## 2019-09-14 RX ADMIN — Medication 2 UNIT(S): at 08:03

## 2019-09-14 RX ADMIN — Medication 100 MILLIGRAM(S): at 17:24

## 2019-09-14 RX ADMIN — Medication 40 MILLIEQUIVALENT(S): at 12:16

## 2019-09-14 RX ADMIN — Medication 1: at 08:02

## 2019-09-14 RX ADMIN — Medication 0.5 MILLIGRAM(S): at 22:09

## 2019-09-14 RX ADMIN — Medication 2 UNIT(S): at 17:25

## 2019-09-14 RX ADMIN — ATORVASTATIN CALCIUM 10 MILLIGRAM(S): 80 TABLET, FILM COATED ORAL at 22:10

## 2019-09-14 NOTE — PROGRESS NOTE ADULT - PROBLEM SELECTOR PLAN 4
On 137.5 mcg levothyroxine at home. TSH 14.1 with T4 1.2, possibly sick euthyroid. Repeat TSH 18.2  - Endocrinology following, appreciate recs  - Home dose levothyroxine On 137.5 mcg levothyroxine at home. TSH 14.1 with T4 1.2, possibly sick euthyroid. Repeat TSH 18.2  - Endocrinology following, appreciate recs  - Home dose levothyroxine 137 mcg

## 2019-09-14 NOTE — PROGRESS NOTE ADULT - PROBLEM SELECTOR PLAN 8
- on sandostatin monthly. Per endocrine will hold while admitted  - wife to notify due date for next dose   - Endocrine f/u

## 2019-09-14 NOTE — PROGRESS NOTE ADULT - PROBLEM SELECTOR PLAN 3
New problem. Patient NPO yesterday, pre-renal is most likely etiology. Not on new nephrotoxic medications. Possibly obstructive/post-renal as well  - - straight cath for now - likely multifactorial - immobility, constipation   monitor urine output - bowel regimen, increase mobility - d/w family if persistent retention will need to place parekh  - Urine Lytes  - Small bolus IVF New problem. Patient NPO yesterday, pre-renal is most likely etiology given FeNa. Not on new nephrotoxic medications. Possibly obstructive/post-renal as well. Cr decreasing today.  - - straight cath for now - likely multifactorial - immobility, constipation   monitor urine output - bowel regimen, increase mobility. Difficulty doing straight cath this morning, will place Rahman or Coude catheter per Urology recs  - Small bolus IVF  - Senna, docusate, miralax for bowel regimen

## 2019-09-14 NOTE — PROGRESS NOTE ADULT - SUBJECTIVE AND OBJECTIVE BOX
Supa Licona, PGY-1  Internal Medicine  Pager [phone] / [phone]    Patient is a 79y old  Male who presents with a chief complaint of AMS (13 Sep 2019 16:22)      SUBJECTIVE / OVERNIGHT EVENTS:    MEDICATIONS  (STANDING):  carvedilol 25 milliGRAM(s) Oral every 12 hours  dextrose 5% + sodium chloride 0.9%. 1000 milliLiter(s) (50 mL/Hr) IV Continuous <Continuous>  dextrose 5%. 1000 milliLiter(s) (50 mL/Hr) IV Continuous <Continuous>  dextrose 50% Injectable 12.5 Gram(s) IV Push once  dextrose 50% Injectable 25 Gram(s) IV Push once  dextrose 50% Injectable 25 Gram(s) IV Push once  docusate sodium 100 milliGRAM(s) Oral two times a day  enoxaparin Injectable 40 milliGRAM(s) SubCutaneous daily  insulin glargine Injectable (LANTUS) 6 Unit(s) SubCutaneous at bedtime  insulin lispro (HumaLOG) corrective regimen sliding scale   SubCutaneous three times a day before meals  insulin lispro (HumaLOG) corrective regimen sliding scale   SubCutaneous at bedtime  insulin lispro Injectable (HumaLOG) 2 Unit(s) SubCutaneous three times a day before meals  levothyroxine 137 MICROGram(s) Oral daily  LORazepam     Tablet 0.5 milliGRAM(s) Oral at bedtime  melatonin 3 milliGRAM(s) Oral at bedtime  multivitamin/minerals 1 Tablet(s) Oral daily  PARoxetine 20 milliGRAM(s) Oral daily  pyridoxine 100 milliGRAM(s) Oral daily  senna 2 Tablet(s) Oral at bedtime  tamsulosin 0.4 milliGRAM(s) Oral at bedtime    MEDICATIONS  (PRN):  dextrose 40% Gel 15 Gram(s) Oral once PRN Blood Glucose LESS THAN 70 milliGRAM(s)/deciliter  glucagon  Injectable 1 milliGRAM(s) IntraMuscular once PRN Glucose LESS THAN 70 milligrams/deciliter  glucagon  Injectable 1 milliGRAM(s) IntraMuscular once PRN Glucose LESS THAN 70 milligrams/deciliter      CAPILLARY BLOOD GLUCOSE      POCT Blood Glucose.: 237 mg/dL (13 Sep 2019 22:09)  POCT Blood Glucose.: 141 mg/dL (13 Sep 2019 17:34)  POCT Blood Glucose.: 164 mg/dL (13 Sep 2019 12:04)  POCT Blood Glucose.: 114 mg/dL (13 Sep 2019 08:01)    I&O's Summary    12 Sep 2019 07:01  -  13 Sep 2019 07:00  --------------------------------------------------------  IN: 490 mL / OUT: 10 mL / NET: 480 mL    13 Sep 2019 07:01  -  14 Sep 2019 06:21  --------------------------------------------------------  IN: 720 mL / OUT: 1200 mL / NET: -480 mL        PHYSICAL EXAM:  Vital Signs Last 24 Hrs  T(C): 36.7 (09-14-19 @ 05:07)  T(F): 98 (09-14-19 @ 05:07), Max: 98.1 (09-13-19 @ 20:15)  HR: 58 (09-14-19 @ 05:07) (55 - 66)  BP: 152/68 (09-14-19 @ 05:07)  BP(mean): --  RR: 18 (09-14-19 @ 05:07) (18 - 18)  SpO2: 96% (09-14-19 @ 05:07) (96% - 99%)  Wt(kg): --    09-12 @ 07:01  -  09-13 @ 07:00  --------------------------------------------------------  IN: 490 mL / OUT: 10 mL / NET: 480 mL    09-13 @ 07:01  -  09-14 @ 06:21  --------------------------------------------------------  IN: 720 mL / OUT: 1200 mL / NET: -480 mL      GENERAL: NAD, well-developed  HEAD:  Atraumatic, Normocephalic, no facial droop  EYES: EOMI, PERRLA, conjunctiva and sclera clear  NECK: Supple  CHEST/LUNG: Clear to auscultation bilaterally; No wheeze  HEART: Regular rate and rhythm; No murmurs, rubs, or gallops  ABDOMEN: Soft, Nontender, Nondistended; Bowel sounds present. Hard object felt under skin in lower abdomen (chemo pump)  EXTREMITIES:  2+ Peripheral Pulses, No clubbing, cyanosis, or edema  PSYCH: AAOx2 (date wrong by 1 week)  NEUROLOGY: non-focal. CN 2-12 intact. Sensation intact and equal in all extremities. Strength 5/5 in all extremities and equal bilaterally.   SKIN: No rashes or lesions    LABS:                        12.1   6.52  )-----------( 147      ( 13 Sep 2019 10:09 )             34.9     09-13    137  |  102  |  29<H>  ----------------------------<  151<H>  3.7   |  22  |  1.55<H>    Ca    8.9      13 Sep 2019 07:08  Phos  3.0     09-13  Mg     2.0     09-13                RADIOLOGY & ADDITIONAL TESTS:    Imaging Personally Reviewed:    Consultant(s) Notes Reviewed:      Care Discussed with Consultants/Other Providers: Supa Licona, PGY-1  Internal Medicine  Pager [phone] / [phone]    Patient is a 79y old  Male who presents with a chief complaint of AMS (13 Sep 2019 16:22)    SUBJECTIVE / OVERNIGHT EVENTS:  Overnight, patient had PVR of >900cc and underwent straight cath. Had another PVR this AM of 437, when straight cath was attempted, the nurse felt resistance and could not advance it. The patient was feeling pain at this time. Patient states he has never had prostate issues in the past and has had his prostate checked.  Denies nausea, vomiting, constipation, diarrhea, fevers, chills, chest pain, SOB.    MEDICATIONS  (STANDING):  carvedilol 25 milliGRAM(s) Oral every 12 hours  dextrose 5% + sodium chloride 0.9%. 1000 milliLiter(s) (50 mL/Hr) IV Continuous <Continuous>  dextrose 5%. 1000 milliLiter(s) (50 mL/Hr) IV Continuous <Continuous>  dextrose 50% Injectable 12.5 Gram(s) IV Push once  dextrose 50% Injectable 25 Gram(s) IV Push once  dextrose 50% Injectable 25 Gram(s) IV Push once  docusate sodium 100 milliGRAM(s) Oral two times a day  enoxaparin Injectable 40 milliGRAM(s) SubCutaneous daily  insulin glargine Injectable (LANTUS) 6 Unit(s) SubCutaneous at bedtime  insulin lispro (HumaLOG) corrective regimen sliding scale   SubCutaneous three times a day before meals  insulin lispro (HumaLOG) corrective regimen sliding scale   SubCutaneous at bedtime  insulin lispro Injectable (HumaLOG) 2 Unit(s) SubCutaneous three times a day before meals  levothyroxine 137 MICROGram(s) Oral daily  LORazepam     Tablet 0.5 milliGRAM(s) Oral at bedtime  melatonin 3 milliGRAM(s) Oral at bedtime  multivitamin/minerals 1 Tablet(s) Oral daily  PARoxetine 20 milliGRAM(s) Oral daily  pyridoxine 100 milliGRAM(s) Oral daily  senna 2 Tablet(s) Oral at bedtime  tamsulosin 0.4 milliGRAM(s) Oral at bedtime    MEDICATIONS  (PRN):  dextrose 40% Gel 15 Gram(s) Oral once PRN Blood Glucose LESS THAN 70 milliGRAM(s)/deciliter  glucagon  Injectable 1 milliGRAM(s) IntraMuscular once PRN Glucose LESS THAN 70 milligrams/deciliter  glucagon  Injectable 1 milliGRAM(s) IntraMuscular once PRN Glucose LESS THAN 70 milligrams/deciliter      CAPILLARY BLOOD GLUCOSE      POCT Blood Glucose.: 237 mg/dL (13 Sep 2019 22:09)  POCT Blood Glucose.: 141 mg/dL (13 Sep 2019 17:34)  POCT Blood Glucose.: 164 mg/dL (13 Sep 2019 12:04)  POCT Blood Glucose.: 114 mg/dL (13 Sep 2019 08:01)    I&O's Summary    12 Sep 2019 07:01  -  13 Sep 2019 07:00  --------------------------------------------------------  IN: 490 mL / OUT: 10 mL / NET: 480 mL    13 Sep 2019 07:01  -  14 Sep 2019 06:21  --------------------------------------------------------  IN: 720 mL / OUT: 1200 mL / NET: -480 mL        PHYSICAL EXAM:  Vital Signs Last 24 Hrs  T(C): 36.7 (09-14-19 @ 05:07)  T(F): 98 (09-14-19 @ 05:07), Max: 98.1 (09-13-19 @ 20:15)  HR: 58 (09-14-19 @ 05:07) (55 - 66)  BP: 152/68 (09-14-19 @ 05:07)  BP(mean): --  RR: 18 (09-14-19 @ 05:07) (18 - 18)  SpO2: 96% (09-14-19 @ 05:07) (96% - 99%)  Wt(kg): --    09-12 @ 07:01  -  09-13 @ 07:00  --------------------------------------------------------  IN: 490 mL / OUT: 10 mL / NET: 480 mL    09-13 @ 07:01  -  09-14 @ 06:21  --------------------------------------------------------  IN: 720 mL / OUT: 1200 mL / NET: -480 mL      GENERAL: NAD, well-developed  HEAD:  Atraumatic, Normocephalic, no facial droop  EYES: EOMI, PERRLA, conjunctiva and sclera clear  NECK: Supple  CHEST/LUNG: Clear to auscultation bilaterally; No wheeze  HEART: Regular rate and rhythm; No murmurs, rubs, or gallops  ABDOMEN: Soft, Nontender, Nondistended; Bowel sounds present. Hard object felt under skin in lower abdomen (chemo pump)  EXTREMITIES:  2+ Peripheral Pulses, No clubbing, cyanosis, or edema  PSYCH: AAOx3  NEUROLOGY: non-focal. CN 2-12 intact. Sensation intact and equal in all extremities. Strength 5/5 in all extremities and equal bilaterally.   SKIN: No rashes or lesions    LABS:                        12.1   6.52  )-----------( 147      ( 13 Sep 2019 10:09 )             34.9     09-13    137  |  102  |  29<H>  ----------------------------<  151<H>  3.7   |  22  |  1.55<H>    Ca    8.9      13 Sep 2019 07:08  Phos  3.0     09-13  Mg     2.0     09-13      RADIOLOGY & ADDITIONAL TESTS:    Imaging Personally Reviewed:    Consultant(s) Notes Reviewed:      Care Discussed with Consultants/Other Providers: Supa Licona, PGY-1  Internal Medicine  Pager [phone] / [phone]    Patient is a 79y old  Male who presents with a chief complaint of AMS (13 Sep 2019 16:22)    SUBJECTIVE / OVERNIGHT EVENTS:  Overnight, patient had PVR of >900cc and underwent straight cath. Had another PVR this AM of 437, when straight cath was attempted, the nurse felt resistance and could not advance it. The patient was feeling pain at this time. Patient states he has never had prostate issues in the past and has had his prostate checked.  Denies nausea, vomiting, constipation, diarrhea, fevers, chills, chest pain, SOB.    MEDICATIONS  (STANDING):  carvedilol 25 milliGRAM(s) Oral every 12 hours  dextrose 5% + sodium chloride 0.9%. 1000 milliLiter(s) (50 mL/Hr) IV Continuous <Continuous>  dextrose 5%. 1000 milliLiter(s) (50 mL/Hr) IV Continuous <Continuous>  dextrose 50% Injectable 12.5 Gram(s) IV Push once  dextrose 50% Injectable 25 Gram(s) IV Push once  dextrose 50% Injectable 25 Gram(s) IV Push once  docusate sodium 100 milliGRAM(s) Oral two times a day  enoxaparin Injectable 40 milliGRAM(s) SubCutaneous daily  insulin glargine Injectable (LANTUS) 6 Unit(s) SubCutaneous at bedtime  insulin lispro (HumaLOG) corrective regimen sliding scale   SubCutaneous three times a day before meals  insulin lispro (HumaLOG) corrective regimen sliding scale   SubCutaneous at bedtime  insulin lispro Injectable (HumaLOG) 2 Unit(s) SubCutaneous three times a day before meals  levothyroxine 137 MICROGram(s) Oral daily  LORazepam     Tablet 0.5 milliGRAM(s) Oral at bedtime  melatonin 3 milliGRAM(s) Oral at bedtime  multivitamin/minerals 1 Tablet(s) Oral daily  PARoxetine 20 milliGRAM(s) Oral daily  pyridoxine 100 milliGRAM(s) Oral daily  senna 2 Tablet(s) Oral at bedtime  tamsulosin 0.4 milliGRAM(s) Oral at bedtime    MEDICATIONS  (PRN):  dextrose 40% Gel 15 Gram(s) Oral once PRN Blood Glucose LESS THAN 70 milliGRAM(s)/deciliter  glucagon  Injectable 1 milliGRAM(s) IntraMuscular once PRN Glucose LESS THAN 70 milligrams/deciliter  glucagon  Injectable 1 milliGRAM(s) IntraMuscular once PRN Glucose LESS THAN 70 milligrams/deciliter      CAPILLARY BLOOD GLUCOSE      POCT Blood Glucose.: 237 mg/dL (13 Sep 2019 22:09)  POCT Blood Glucose.: 141 mg/dL (13 Sep 2019 17:34)  POCT Blood Glucose.: 164 mg/dL (13 Sep 2019 12:04)  POCT Blood Glucose.: 114 mg/dL (13 Sep 2019 08:01)    I&O's Summary    12 Sep 2019 07:01  -  13 Sep 2019 07:00  --------------------------------------------------------  IN: 490 mL / OUT: 10 mL / NET: 480 mL    13 Sep 2019 07:01  -  14 Sep 2019 06:21  --------------------------------------------------------  IN: 720 mL / OUT: 1200 mL / NET: -480 mL        PHYSICAL EXAM:  Vital Signs Last 24 Hrs  T(C): 36.7 (09-14-19 @ 05:07)  T(F): 98 (09-14-19 @ 05:07), Max: 98.1 (09-13-19 @ 20:15)  HR: 58 (09-14-19 @ 05:07) (55 - 66)  BP: 152/68 (09-14-19 @ 05:07)  BP(mean): --  RR: 18 (09-14-19 @ 05:07) (18 - 18)  SpO2: 96% (09-14-19 @ 05:07) (96% - 99%)  Wt(kg): --    09-12 @ 07:01  -  09-13 @ 07:00  --------------------------------------------------------  IN: 490 mL / OUT: 10 mL / NET: 480 mL    09-13 @ 07:01  -  09-14 @ 06:21  --------------------------------------------------------  IN: 720 mL / OUT: 1200 mL / NET: -480 mL      GENERAL: NAD, well-developed  HEAD:  Atraumatic, Normocephalic, no facial droop  EYES: EOMI, PERRLA, conjunctiva and sclera clear  NECK: Supple  CHEST/LUNG: Clear to auscultation bilaterally; No wheeze  HEART: Regular rate and rhythm; No murmurs, rubs, or gallops  ABDOMEN: Soft, Nontender, Nondistended; Bowel sounds present. Hard object felt under skin in lower abdomen (chemo pump)  EXTREMITIES:  2+ Peripheral Pulses, No clubbing, cyanosis, or edema  PSYCH: AAOx3  NEUROLOGY: non-focal. CN 2-12 intact. Sensation intact and equal in all extremities. Strength 5/5 in all extremities and equal bilaterally.   SKIN: No rashes or lesions    LABS:                          11.7   5.51  )-----------( 127      ( 14 Sep 2019 10:24 )             33.9     09-14    136  |  103  |  30<H>  ----------------------------<  212<H>  3.3<L>   |  23  |  1.36<H>    Ca    8.9      14 Sep 2019 07:12  Phos  2.6     09-14  Mg     1.9     09-14    	    RADIOLOGY & ADDITIONAL TESTS:    Imaging Personally Reviewed:    Consultant(s) Notes Reviewed:      Care Discussed with Consultants/Other Providers:  Urology - recommended Rahman or Coude catheter placement

## 2019-09-14 NOTE — PROGRESS NOTE ADULT - ASSESSMENT
79M pmh metastatic colon Ca, HTN, DM w/ frequent ED visits for hypoglycemia, hypothyroidism here for AMS w/ intubation for agitation and to allow CT head. Most likely 2/2 hypoglycemia.

## 2019-09-14 NOTE — PROGRESS NOTE ADULT - PROBLEM SELECTOR PLAN 9
DVT: SQH  Diet - CC/DASH  Dispo - PT eval Wife today brought updated medication list. Updated home medications in chart.  - Added Pravastatin 20 mg (started atorvastatin 10 mg in hospital)  - Added Spironolactone 50 mg BID - holding for DERECK/retention  - Added potassium chloride 40 mg BID  - Changed Synthroid dose to 150 mcg, which is home dose.

## 2019-09-14 NOTE — PROGRESS NOTE ADULT - PROBLEM SELECTOR PLAN 1
Patient admitted to MICU, required intubation for concern for inability to protect airway. Intubated 9/9, extubated 9/11. Mental status improved. EEG w/o evidence of seizures. CT, MRI negative for cause. FSGs were in 200s by EMS, though this was after glucose administration. Likely etiology is hypoglycemia, as patient has had multiple episodes of hypoglycemia causing encephalopathy and hospital presentation. Patient may have labile blood sugars. Receiving 8u Lantus last night. Lantus 14u at home. Avoid hypoglycemia. Became confused and hallucinated overnight.  - Endocrinology following, appreciate recs  - 8u Lantus tonight  - 2 u Humalog at meals  - Goal   - Monitor neuro status. BL neuro exam nonfocal.  - AM Cortisol negative  - On Ativan 1.5 mg QHS at home, will give 1mg. Patient admitted to MICU, required intubation for concern for inability to protect airway. Intubated 9/9, extubated 9/11. Mental status improved. EEG w/o evidence of seizures. CT, MRI negative for cause. FSGs were in 200s by EMS, though this was after glucose administration. Likely etiology is hypoglycemia, as patient has had multiple episodes of hypoglycemia causing encephalopathy and hospital presentation. Patient may have labile blood sugars. Receiving 8u Lantus last night. Lantus 14u at home. Avoid hypoglycemia. Became confused and hallucinated overnight.  - Endocrinology following, appreciate recs  - 8u Lantus tonight  - 2 u Humalog at meals  - Goal -200s  - Monitor neuro status. BL neuro exam nonfocal.  - AM Cortisol negative  - On Ativan 1.5 mg QHS at home, give 1mg qhs.

## 2019-09-14 NOTE — PROVIDER CONTACT NOTE (OTHER) - ASSESSMENT
pt unable to void in am; 8 hours since last catheterization ; bladder scan 437. pt denies pain/discomfort. in last catheterization, pt complained of discomfort during insertion and urine dribbled out slowly.

## 2019-09-15 ENCOUNTER — TRANSCRIPTION ENCOUNTER (OUTPATIENT)
Age: 79
End: 2019-09-15

## 2019-09-15 DIAGNOSIS — R33.9 RETENTION OF URINE, UNSPECIFIED: ICD-10-CM

## 2019-09-15 LAB
ANION GAP SERPL CALC-SCNC: 10 MMOL/L — SIGNIFICANT CHANGE UP (ref 5–17)
BUN SERPL-MCNC: 26 MG/DL — HIGH (ref 7–23)
CALCIUM SERPL-MCNC: 8.7 MG/DL — SIGNIFICANT CHANGE UP (ref 8.4–10.5)
CHLORIDE SERPL-SCNC: 105 MMOL/L — SIGNIFICANT CHANGE UP (ref 96–108)
CO2 SERPL-SCNC: 22 MMOL/L — SIGNIFICANT CHANGE UP (ref 22–31)
CREAT SERPL-MCNC: 1.1 MG/DL — SIGNIFICANT CHANGE UP (ref 0.5–1.3)
CULTURE RESULTS: SIGNIFICANT CHANGE UP
CULTURE RESULTS: SIGNIFICANT CHANGE UP
GLUCOSE BLDC GLUCOMTR-MCNC: 149 MG/DL — HIGH (ref 70–99)
GLUCOSE BLDC GLUCOMTR-MCNC: 228 MG/DL — HIGH (ref 70–99)
GLUCOSE BLDC GLUCOMTR-MCNC: 284 MG/DL — HIGH (ref 70–99)
GLUCOSE BLDC GLUCOMTR-MCNC: 305 MG/DL — HIGH (ref 70–99)
GLUCOSE SERPL-MCNC: 169 MG/DL — HIGH (ref 70–99)
HCT VFR BLD CALC: 33.5 % — LOW (ref 39–50)
HGB BLD-MCNC: 11.6 G/DL — LOW (ref 13–17)
MAGNESIUM SERPL-MCNC: 1.8 MG/DL — SIGNIFICANT CHANGE UP (ref 1.6–2.6)
MCHC RBC-ENTMCNC: 34.6 GM/DL — SIGNIFICANT CHANGE UP (ref 32–36)
MCHC RBC-ENTMCNC: 37.5 PG — HIGH (ref 27–34)
MCV RBC AUTO: 108.4 FL — HIGH (ref 80–100)
PHOSPHATE SERPL-MCNC: 2.3 MG/DL — LOW (ref 2.5–4.5)
PLATELET # BLD AUTO: 124 K/UL — LOW (ref 150–400)
POTASSIUM SERPL-MCNC: 3.9 MMOL/L — SIGNIFICANT CHANGE UP (ref 3.5–5.3)
POTASSIUM SERPL-SCNC: 3.9 MMOL/L — SIGNIFICANT CHANGE UP (ref 3.5–5.3)
RBC # BLD: 3.09 M/UL — LOW (ref 4.2–5.8)
RBC # FLD: 15.9 % — HIGH (ref 10.3–14.5)
SODIUM SERPL-SCNC: 137 MMOL/L — SIGNIFICANT CHANGE UP (ref 135–145)
SPECIMEN SOURCE: SIGNIFICANT CHANGE UP
SPECIMEN SOURCE: SIGNIFICANT CHANGE UP
WBC # BLD: 6.04 K/UL — SIGNIFICANT CHANGE UP (ref 3.8–10.5)
WBC # FLD AUTO: 6.04 K/UL — SIGNIFICANT CHANGE UP (ref 3.8–10.5)

## 2019-09-15 PROCEDURE — 99232 SBSQ HOSP IP/OBS MODERATE 35: CPT | Mod: GC

## 2019-09-15 RX ORDER — ASPIRIN/CALCIUM CARB/MAGNESIUM 324 MG
1 TABLET ORAL
Qty: 0 | Refills: 0 | DISCHARGE

## 2019-09-15 RX ORDER — MULTIVIT-MIN/FERROUS GLUCONATE 9 MG/15 ML
1 LIQUID (ML) ORAL
Qty: 0 | Refills: 0 | DISCHARGE

## 2019-09-15 RX ORDER — CARVEDILOL PHOSPHATE 80 MG/1
1 CAPSULE, EXTENDED RELEASE ORAL
Qty: 0 | Refills: 0 | DISCHARGE

## 2019-09-15 RX ORDER — AMLODIPINE BESYLATE 2.5 MG/1
1 TABLET ORAL
Qty: 0 | Refills: 0 | DISCHARGE

## 2019-09-15 RX ORDER — PYRIDOXINE HCL (VITAMIN B6) 100 MG
1 TABLET ORAL
Qty: 0 | Refills: 0 | DISCHARGE

## 2019-09-15 RX ORDER — MAGNESIUM CHLORIDE
500 CRYSTALS MISCELLANEOUS
Qty: 0 | Refills: 0 | DISCHARGE

## 2019-09-15 RX ORDER — OCTREOTIDE ACETATE 200 UG/ML
0 INJECTION, SOLUTION INTRAVENOUS; SUBCUTANEOUS
Qty: 0 | Refills: 0 | DISCHARGE

## 2019-09-15 RX ORDER — SODIUM,POTASSIUM PHOSPHATES 278-250MG
1 POWDER IN PACKET (EA) ORAL
Refills: 0 | Status: COMPLETED | OUTPATIENT
Start: 2019-09-15 | End: 2019-09-15

## 2019-09-15 RX ORDER — LEVOTHYROXINE SODIUM 125 MCG
1 TABLET ORAL
Qty: 0 | Refills: 0 | DISCHARGE

## 2019-09-15 RX ORDER — POTASSIUM CHLORIDE 20 MEQ
2 PACKET (EA) ORAL
Qty: 0 | Refills: 0 | DISCHARGE

## 2019-09-15 RX ADMIN — POLYETHYLENE GLYCOL 3350 17 GRAM(S): 17 POWDER, FOR SOLUTION ORAL at 12:48

## 2019-09-15 RX ADMIN — Medication 20 MILLIGRAM(S): at 17:06

## 2019-09-15 RX ADMIN — Medication 1 PACKET(S): at 21:23

## 2019-09-15 RX ADMIN — SENNA PLUS 2 TABLET(S): 8.6 TABLET ORAL at 21:23

## 2019-09-15 RX ADMIN — TAMSULOSIN HYDROCHLORIDE 0.4 MILLIGRAM(S): 0.4 CAPSULE ORAL at 21:23

## 2019-09-15 RX ADMIN — Medication 2: at 22:04

## 2019-09-15 RX ADMIN — Medication 2: at 12:48

## 2019-09-15 RX ADMIN — Medication 2 UNIT(S): at 12:48

## 2019-09-15 RX ADMIN — ATORVASTATIN CALCIUM 10 MILLIGRAM(S): 80 TABLET, FILM COATED ORAL at 21:23

## 2019-09-15 RX ADMIN — Medication 2 UNIT(S): at 17:28

## 2019-09-15 RX ADMIN — Medication 100 MILLIGRAM(S): at 12:48

## 2019-09-15 RX ADMIN — Medication 3 MILLIGRAM(S): at 21:23

## 2019-09-15 RX ADMIN — ENOXAPARIN SODIUM 40 MILLIGRAM(S): 100 INJECTION SUBCUTANEOUS at 12:48

## 2019-09-15 RX ADMIN — Medication 150 MICROGRAM(S): at 05:53

## 2019-09-15 RX ADMIN — Medication 1 PACKET(S): at 17:06

## 2019-09-15 RX ADMIN — Medication 100 MILLIGRAM(S): at 05:53

## 2019-09-15 RX ADMIN — Medication 1 TABLET(S): at 12:48

## 2019-09-15 RX ADMIN — CARVEDILOL PHOSPHATE 25 MILLIGRAM(S): 80 CAPSULE, EXTENDED RELEASE ORAL at 05:53

## 2019-09-15 RX ADMIN — Medication 100 MILLIGRAM(S): at 17:06

## 2019-09-15 RX ADMIN — CARVEDILOL PHOSPHATE 25 MILLIGRAM(S): 80 CAPSULE, EXTENDED RELEASE ORAL at 17:06

## 2019-09-15 RX ADMIN — Medication 3: at 17:29

## 2019-09-15 RX ADMIN — Medication 2 UNIT(S): at 08:26

## 2019-09-15 RX ADMIN — INSULIN GLARGINE 6 UNIT(S): 100 INJECTION, SOLUTION SUBCUTANEOUS at 22:04

## 2019-09-15 RX ADMIN — Medication 1 PACKET(S): at 12:48

## 2019-09-15 RX ADMIN — Medication 0.5 MILLIGRAM(S): at 21:22

## 2019-09-15 NOTE — DISCHARGE NOTE PROVIDER - CARE PROVIDERS DIRECT ADDRESSES
,DirectAddress_Unknown,[email or web address]

## 2019-09-15 NOTE — DISCHARGE NOTE PROVIDER - NSDCCPCAREPLAN_GEN_ALL_CORE_FT
PRINCIPAL DISCHARGE DIAGNOSIS  Diagnosis: Encephalopathy, unspecified  Assessment and Plan of Treatment: You were admitted for altered mental status. You were unresponsive at home and in the emergency department. Scans of your head and measurements of your brain activity did not show a reason for this episode. It was determined that the most likely cause of your altered mental status was low blood sugar. PRINCIPAL DISCHARGE DIAGNOSIS  Diagnosis: Encephalopathy, unspecified  Assessment and Plan of Treatment: You were admitted for altered mental status. You were unresponsive at home and in the emergency department. Scans of your head and measurements of your brain activity did not show a reason for this episode. It was determined that the most likely cause of your altered mental status was low blood sugar.      SECONDARY DISCHARGE DIAGNOSES  Diagnosis: Urinary retention  Assessment and Plan of Treatment: You had a parekh catheter, but you failed to urinate and your bladder was distended with 780 mL of urine. You were started on tamsulosin to help with urinary retention. You will follow up with your outpatient urologist or any other urologist who can be seen.

## 2019-09-15 NOTE — DISCHARGE NOTE PROVIDER - HOSPITAL COURSE
79M pmh metastatic colon Ca, HTN, DM, hypothyroidism here for AMS w/ intubation for agitation. As per wife, she spoke to him last time before he decided to take nap in the evening, he c/o being tired (not new c/o since he was on chemo). Wife woke him up at 8 pm for dinner, he responded that he has to use restroom. Pt did not come back, she found him in bedroom in curling position in the bed and urinated on himself. He was not responding to voice, she realized that this could be from hypoglycemia (similar episodes recently too).  Patient has had previous episodes of hypoglycemia requiring ED visits and hospitalization so wife gave him glucose packets but did not get better so called EMS. Initially FSG per EMS 250s, first repeat in ED 99 and second 80. Pt was given an amp of D50 but was not following commands in ED, combative and flailing in bed. Patient moved to critical area. Attempted 2mg versed IV without improvement. Intubated for stat head CT. CT-head revealed no hemorrhage, midline shift, or hydrocephalus. CT angio of head and neck also performed, results pending. UA negative. Blood culture, urine cultures pending. Per wife, only difference this time from prior visits was agitation and not following commands. Patient does not have a history of seizures and has recently been choking on food, needed Heimlich 2x in past week.        ED Course: In the ED, patient was not responsive to commands and was combative. Patient was intubated in the ED on 9/9. STAT head CT was obtained which did not reveal a cause for the patient's encephalopathy. 79M pmh metastatic colon Ca, HTN, DM, hypothyroidism here for AMS w/ intubation for agitation. As per wife, she spoke to him last time before he decided to take nap in the evening, he c/o being tired (not new c/o since he was on chemo). Wife woke him up at 8 pm for dinner, he responded that he has to use restroom. Pt did not come back, she found him in bedroom in curling position in the bed and urinated on himself. He was not responding to voice, she realized that this could be from hypoglycemia (similar episodes recently too).  Patient has had previous episodes of hypoglycemia requiring ED visits and hospitalization so wife gave him glucose packets but did not get better so called EMS. Initially FSG per EMS 250s, first repeat in ED 99 and second 80. Pt was given an amp of D50 but was not following commands in ED, combative and flailing in bed. Patient moved to critical area. Attempted 2mg versed IV without improvement. Intubated for stat head CT. CT-head revealed no hemorrhage, midline shift, or hydrocephalus. CT angio of head and neck also performed, results pending. UA negative. Blood culture, urine cultures pending. Per wife, only difference this time from prior visits was agitation and not following commands. Patient does not have a history of seizures and has recently been choking on food, needed Heimlich 2x in past week.        ED Course: In the ED, patient was not responsive to commands and was combative. Patient was intubated in the ED on 9/9. STAT head CT was obtained which did not reveal a cause for the patient's encephalopathy.         Hospital Course: Patient admitted to the MICU after being intubated s/p being found unresponsive at home. CT head, CTA head and neck wnl. Patients avastin and xeloda held. LP attempted but was unsuccessful. Protonix started for GI prophylaxis. EEG showed moderate to severe diffuse or multifocal cerebral dysfunction. There were no epileptiform abnormalities recorded.  09/11 patient weaned off sedation and extubated. Patient mental status improved with less agitation. MRI of the head normal.     Bedside swallow was performed after patient was transferred to the general floor, and was normal. Upon arriving on the floor, patient exhibited some anomia and slightly garbled speech, and was found to have blood glucose in the low 100s. His condition improved with administration of one ampule of D50.         The patient later exhibited hallucinations that occurred when going to, or awakening from sleep. He was redirectable during each of these episodes. The patient was also found to have a creatinine elevation. Workup of this showed that he was retaining urine. Post-void residuals were repeatedly elevated, and a Rahman catheter was inserted. 79M pmh metastatic colon Ca, HTN, DM, hypothyroidism here for AMS w/ intubation for agitation. As per wife, she spoke to him last time before he decided to take nap in the evening, he c/o being tired (not new c/o since he was on chemo). Wife woke him up at 8 pm for dinner, he responded that he has to use restroom. Pt did not come back, she found him in bedroom in curling position in the bed and urinated on himself. He was not responding to voice, she realized that this could be from hypoglycemia (similar episodes recently too).  Patient has had previous episodes of hypoglycemia requiring ED visits and hospitalization so wife gave him glucose packets but did not get better so called EMS. Initially FSG per EMS 250s, first repeat in ED 99 and second 80. Pt was given an amp of D50 but was not following commands in ED, combative and flailing in bed. Patient moved to critical area. Attempted 2mg versed IV without improvement. Intubated for stat head CT. CT-head revealed no hemorrhage, midline shift, or hydrocephalus. CT angio of head and neck also performed, results pending. UA negative. Blood culture, urine cultures pending. Per wife, only difference this time from prior visits was agitation and not following commands. Patient does not have a history of seizures and has recently been choking on food, needed Heimlich 2x in past week.        ED Course: In the ED, patient was not responsive to commands and was combative. Patient was intubated in the ED on 9/9. STAT head CT was obtained which did not reveal a cause for the patient's encephalopathy.         Hospital Course: Patient admitted to the MICU after being intubated s/p being found unresponsive at home. CT head, CTA head and neck wnl. Patients avastin and xeloda held. LP attempted but was unsuccessful. Protonix started for GI prophylaxis. EEG showed moderate to severe diffuse or multifocal cerebral dysfunction. There were no epileptiform abnormalities recorded.  09/11 patient weaned off sedation and extubated. Patient mental status improved with less agitation. MRI of the head normal.     Bedside swallow was performed after patient was transferred to the general floor, and was normal. Upon arriving on the floor, patient exhibited some anomia and slightly garbled speech, and was found to have blood glucose in the low 100s. His condition improved with administration of one ampule of D50.         The patient later exhibited hallucinations that occurred when going to, or awakening from sleep. He was redirectable during each of these episodes. The patient was also found to have a creatinine elevation. Workup of this showed that he was retaining urine. Post-void residuals were repeatedly elevated, and a Rahman catheter was inserted.        On the floor, he was managed for constipation and urinary retention. Constipation resolved with tap water enema. He failed trial of void with straight cath for 780 mL. Rahman catheter was placed and he was discharged with follow up with urology. 79M pmh metastatic colon Ca, HTN, DM, hypothyroidism here for AMS w/ intubation for agitation. As per wife, she spoke to him last time before he decided to take nap in the evening, he c/o being tired (not new c/o since he was on chemo). Wife woke him up at 8 pm for dinner, he responded that he has to use restroom. Pt did not come back, she found him in bedroom in curling position in the bed and urinated on himself. He was not responding to voice, she realized that this could be from hypoglycemia (similar episodes recently too).  Patient has had previous episodes of hypoglycemia requiring ED visits and hospitalization so wife gave him glucose packets but did not get better so called EMS. Initially FSG per EMS 250s, first repeat in ED 99 and second 80. Pt was given an amp of D50 but was not following commands in ED, combative and flailing in bed. Patient moved to critical area. Attempted 2mg versed IV without improvement. Intubated for stat head CT. CT-head revealed no hemorrhage, midline shift, or hydrocephalus. CT angio of head and neck also performed, results pending. UA negative. Blood culture, urine cultures pending. Per wife, only difference this time from prior visits was agitation and not following commands. Patient does not have a history of seizures and has recently been choking on food, needed Heimlich 2x in past week.        ED Course: In the ED, patient was not responsive to commands and was combative. Patient was intubated in the ED on 9/9. STAT head CT was obtained which did not reveal a cause for the patient's encephalopathy.         Hospital Course: Patient admitted to the MICU after being intubated s/p being found unresponsive at home. CT head, CTA head and neck wnl. Patients avastin and xeloda held. LP attempted but was unsuccessful. Protonix started for GI prophylaxis. EEG showed moderate to severe diffuse or multifocal cerebral dysfunction. There were no epileptiform abnormalities recorded.  09/11 patient weaned off sedation and extubated. Patient mental status improved with less agitation. MRI of the head normal.     Bedside swallow was performed after patient was transferred to the general floor, and was normal. Upon arriving on the floor, patient exhibited some anomia and slightly garbled speech, and was found to have blood glucose in the low 100s. His condition improved with administration of one ampule of D50.         The patient later exhibited hallucinations that occurred when going to, or awakening from sleep. He was redirectable during each of these episodes. The patient was also found to have a creatinine elevation. Workup of this showed that he was retaining urine. Post-void residuals were repeatedly elevated, and a Rahman catheter was inserted.        On the floor, he was managed for constipation and urinary retention. Constipation resolved with tap water enema. He failed trial of void with straight cath for 780 mL. Rahman catheter was placed and he was discharged with follow up with urology. On 9/18, the patient was seen and evaluated and deemed medically stable for discharge.

## 2019-09-15 NOTE — PROGRESS NOTE ADULT - ASSESSMENT
79M pmh metastatic colon Ca, HTN, DM w/ frequent ED visits for hypoglycemia, hypothyroidism here for AMS w/ intubation for agitation and to allow CT head. Most likely 2/2 hypoglycemia. 79M pmh metastatic colon Ca, HTN, DM w/ frequent ED visits for hypoglycemia, hypothyroidism here for AMS w/ intubation for agitation and to allow CT head. Encephalopathy now resolved likely 2/2 relative hypoglycemia and urinary obstruction.

## 2019-09-15 NOTE — PROGRESS NOTE ADULT - NSHPATTENDINGPLANDISCUSS_GEN_ALL_CORE
Dr. Daniel
wife at bedside
Wife and son at bedside, [facility] team
Son at bedside, [facility] team

## 2019-09-15 NOTE — PROGRESS NOTE ADULT - PROBLEM SELECTOR PLAN 3
New problem. Patient NPO yesterday, pre-renal is most likely etiology given FeNa. Not on new nephrotoxic medications. Possibly obstructive/post-renal as well. Cr decreasing today.  - - straight cath for now - likely multifactorial - immobility, constipation   monitor urine output - bowel regimen, increase mobility. Difficulty doing straight cath this morning, will place Rahman or Coude catheter per Urology recs  - Small bolus IVF  - Senna, docusate, miralax for bowel regimen - 1 amp D50 if BG <90  -Continue lantus 6u qhs, humalog 2u premeal  -Endocrine following, C-peptide low 0.2, endocrine to decide home medication regimen for discharge

## 2019-09-15 NOTE — DISCHARGE NOTE PROVIDER - CARE PROVIDER_API CALL
Flavio Roman)  Medicine  [address], [address]  [place] [address]  Phone: [phone]  Fax: [phone]  Follow Up Time:     Jesus Larson)  Urology  [address], [address]  [place] [address]  Phone: [phone]  Fax: [phone]  Follow Up Time:

## 2019-09-15 NOTE — PROGRESS NOTE ADULT - ATTENDING COMMENTS
79 year old male with a past medical history of metastatic colon Ca, HTN, DM presented with altered mental status, likely toxic/metabolic from a combination of relative hypoglycemia and urinary retention.     Per him and his son at bedside, he is now completely back to his baseline. Able to complete tax returns.    1. Encephalopathy, Acute; presumed toxic metabolic from ?relative hypoglycemia or hypothyroidism.  2. Hypoglycemia associated with type 2 diabetes mellitus: Liberalizing BG goals. Endocrine following. Will appreciate their guidance and obtain dc recommendations  3. Urinary retention: Medication list reviewed. ? if Paxil could contribute, however, this is chronic medication for him. He has some chronic constipation, but tells me he has had regular BM. We added stool regimen. Discussed with urology, parekh in place. Draining clear yellow urine. Started on tamsulosin. Void trial in next 24 hours with PVR's.     Rest per resident note

## 2019-09-15 NOTE — PROGRESS NOTE ADULT - SUBJECTIVE AND OBJECTIVE BOX
Internal Medicine Progress Note    =======================================================  CONTACT INFO  Shilrey La M.D., PGY-3  Pager: [phone] ([facility]) / [phone] ([facility])    Mon-Fri: pager covered by day team 7am-7pm  After 7:00PM on weekdays and 12:00PM on weekends, page [phone]  =======================================================    Patient is a 79y old  Male who presents with a chief complaint of AMS (14 Sep 2019 06:21)      SUBJECTIVE / OVERNIGHT EVENTS: VASYL overnight.    MEDICATIONS  (STANDING):  atorvastatin 10 milliGRAM(s) Oral at bedtime  carvedilol 25 milliGRAM(s) Oral every 12 hours  dextrose 5% + sodium chloride 0.9%. 1000 milliLiter(s) (50 mL/Hr) IV Continuous <Continuous>  dextrose 5%. 1000 milliLiter(s) (50 mL/Hr) IV Continuous <Continuous>  dextrose 50% Injectable 12.5 Gram(s) IV Push once  dextrose 50% Injectable 25 Gram(s) IV Push once  dextrose 50% Injectable 25 Gram(s) IV Push once  docusate sodium 100 milliGRAM(s) Oral two times a day  enoxaparin Injectable 40 milliGRAM(s) SubCutaneous daily  insulin glargine Injectable (LANTUS) 6 Unit(s) SubCutaneous at bedtime  insulin lispro (HumaLOG) corrective regimen sliding scale   SubCutaneous three times a day before meals  insulin lispro (HumaLOG) corrective regimen sliding scale   SubCutaneous at bedtime  insulin lispro Injectable (HumaLOG) 2 Unit(s) SubCutaneous three times a day before meals  levothyroxine 150 MICROGram(s) Oral daily  LORazepam     Tablet 0.5 milliGRAM(s) Oral at bedtime  melatonin 3 milliGRAM(s) Oral at bedtime  multivitamin/minerals 1 Tablet(s) Oral daily  PARoxetine 20 milliGRAM(s) Oral daily  polyethylene glycol 3350 17 Gram(s) Oral daily  pyridoxine 100 milliGRAM(s) Oral daily  senna 2 Tablet(s) Oral at bedtime  tamsulosin 0.4 milliGRAM(s) Oral at bedtime    MEDICATIONS  (PRN):  dextrose 40% Gel 15 Gram(s) Oral once PRN Blood Glucose LESS THAN 70 milliGRAM(s)/deciliter  glucagon  Injectable 1 milliGRAM(s) IntraMuscular once PRN Glucose LESS THAN 70 milligrams/deciliter  glucagon  Injectable 1 milliGRAM(s) IntraMuscular once PRN Glucose LESS THAN 70 milligrams/deciliter    Vital Signs Last 24 Hrs  T(C): 36.9 (15 Sep 2019 05:38), Max: 36.9 (15 Sep 2019 05:38)  T(F): 98.5 (15 Sep 2019 05:38), Max: 98.5 (15 Sep 2019 05:38)  HR: 62 (15 Sep 2019 05:38) (55 - 62)  BP: 136/62 (15 Sep 2019 05:38) (129/68 - 148/72)  BP(mean): --  RR: 18 (15 Sep 2019 05:38) (16 - 18)  SpO2: 94% (15 Sep 2019 05:38) (94% - 97%)    CAPILLARY BLOOD GLUCOSE      POCT Blood Glucose.: 187 mg/dL (14 Sep 2019 22:06)  POCT Blood Glucose.: 138 mg/dL (14 Sep 2019 17:01)  POCT Blood Glucose.: 123 mg/dL (14 Sep 2019 12:15)  POCT Blood Glucose.: 152 mg/dL (14 Sep 2019 08:00)    I&O's Summary    14 Sep 2019 07:01  -  15 Sep 2019 07:00  --------------------------------------------------------  IN: 1460 mL / OUT: 1000 mL / NET: 460 mL        PHYSICAL EXAM  GENERAL: NAD  HEAD:  Atraumatic  EYES: EOMI, PERRLA, conjunctiva and sclera clear  NECK: Supple, No JVD  CHEST/LUNG: Clear to auscultation bilaterally; No wheeze  HEART: Regular rate and rhythm; No murmurs, rubs, or gallops  ABDOMEN: Soft, Nontender, Nondistended; Bowel sounds present  EXTREMITIES:  2+ Peripheral Pulses, No clubbing, cyanosis, or edema  NEURO/PSYCH: AAOx3, nonfocal  SKIN: No rashes or lesions      LABS:                        11.7   5.51  )-----------( 127      ( 14 Sep 2019 10:24 )             33.9     Hemoglobin: 11.7 g/dL (09-14 @ 10:24)  Hemoglobin: 12.1 g/dL (09-13 @ 10:09)  Hemoglobin: 13.8 g/dL (09-12 @ 00:42)  Hemoglobin: 13.6 g/dL (09-11 @ 00:43)    CBC Full  -  ( 14 Sep 2019 10:24 )  WBC Count : 5.51 K/uL  RBC Count : 3.17 M/uL  Hemoglobin : 11.7 g/dL  Hematocrit : 33.9 %  Platelet Count - Automated : 127 K/uL  Mean Cell Volume : 106.9 fl  Mean Cell Hemoglobin : 36.9 pg  Mean Cell Hemoglobin Concentration : 34.5 gm/dL  Auto Neutrophil # : x  Auto Lymphocyte # : x  Auto Monocyte # : x  Auto Eosinophil # : x  Auto Basophil # : x  Auto Neutrophil % : x  Auto Lymphocyte % : x  Auto Monocyte % : x  Auto Eosinophil % : x  Auto Basophil % : x    09-14    136  |  103  |  30<H>  ----------------------------<  212<H>  3.3<L>   |  23  |  1.36<H>    Ca    8.9      14 Sep 2019 07:12  Phos  2.6     09-14  Mg     1.9     09-14      Creatinine Trend: 1.36<--, 1.55<--, 1.16<--, 1.13<--, 1.01<--, 1.14<--      EKG:   MICROBIOLOGY:    IMAGING:      Labs, imaging, EKG personally reviewed     CONSULTS: Internal Medicine Progress Note    =======================================================  CONTACT INFO  Shirley La M.D., PGY-3  Pager: [phone] ([facility]) / [phone] ([facility])    Mon-Fri: pager covered by day team 7am-7pm  After 7:00PM on weekdays and 12:00PM on weekends, page [phone]  =======================================================    Patient is a 79y old  Male who presents with a chief complaint of AMS (14 Sep 2019 06:21)      SUBJECTIVE / OVERNIGHT EVENTS: VASYL overnight. Pt had a large BM yesterday, no blood. Pt is currently doing his taxes with his son Pal at bedside. He denies CP, SOB, confusion, F/C, abd pain. Rahman in place, UOP 550cc past 24h plus 300cc from straight cath. Pt had never had issues with urinary retention previously.    MEDICATIONS  (STANDING):  atorvastatin 10 milliGRAM(s) Oral at bedtime  carvedilol 25 milliGRAM(s) Oral every 12 hours  dextrose 5% + sodium chloride 0.9%. 1000 milliLiter(s) (50 mL/Hr) IV Continuous <Continuous>  dextrose 5%. 1000 milliLiter(s) (50 mL/Hr) IV Continuous <Continuous>  dextrose 50% Injectable 12.5 Gram(s) IV Push once  dextrose 50% Injectable 25 Gram(s) IV Push once  dextrose 50% Injectable 25 Gram(s) IV Push once  docusate sodium 100 milliGRAM(s) Oral two times a day  enoxaparin Injectable 40 milliGRAM(s) SubCutaneous daily  insulin glargine Injectable (LANTUS) 6 Unit(s) SubCutaneous at bedtime  insulin lispro (HumaLOG) corrective regimen sliding scale   SubCutaneous three times a day before meals  insulin lispro (HumaLOG) corrective regimen sliding scale   SubCutaneous at bedtime  insulin lispro Injectable (HumaLOG) 2 Unit(s) SubCutaneous three times a day before meals  levothyroxine 150 MICROGram(s) Oral daily  LORazepam     Tablet 0.5 milliGRAM(s) Oral at bedtime  melatonin 3 milliGRAM(s) Oral at bedtime  multivitamin/minerals 1 Tablet(s) Oral daily  PARoxetine 20 milliGRAM(s) Oral daily  polyethylene glycol 3350 17 Gram(s) Oral daily  pyridoxine 100 milliGRAM(s) Oral daily  senna 2 Tablet(s) Oral at bedtime  tamsulosin 0.4 milliGRAM(s) Oral at bedtime    MEDICATIONS  (PRN):  dextrose 40% Gel 15 Gram(s) Oral once PRN Blood Glucose LESS THAN 70 milliGRAM(s)/deciliter  glucagon  Injectable 1 milliGRAM(s) IntraMuscular once PRN Glucose LESS THAN 70 milligrams/deciliter  glucagon  Injectable 1 milliGRAM(s) IntraMuscular once PRN Glucose LESS THAN 70 milligrams/deciliter    Vital Signs Last 24 Hrs  T(C): 36.9 (15 Sep 2019 05:38), Max: 36.9 (15 Sep 2019 05:38)  T(F): 98.5 (15 Sep 2019 05:38), Max: 98.5 (15 Sep 2019 05:38)  HR: 62 (15 Sep 2019 05:38) (55 - 62)  BP: 136/62 (15 Sep 2019 05:38) (129/68 - 148/72)  BP(mean): --  RR: 18 (15 Sep 2019 05:38) (16 - 18)  SpO2: 94% (15 Sep 2019 05:38) (94% - 97%)    CAPILLARY BLOOD GLUCOSE      POCT Blood Glucose.: 187 mg/dL (14 Sep 2019 22:06)  POCT Blood Glucose.: 138 mg/dL (14 Sep 2019 17:01)  POCT Blood Glucose.: 123 mg/dL (14 Sep 2019 12:15)  POCT Blood Glucose.: 152 mg/dL (14 Sep 2019 08:00)    I&O's Summary    14 Sep 2019 07:01  -  15 Sep 2019 07:00  --------------------------------------------------------  IN: 1460 mL / OUT: 1000 mL / NET: 460 mL        PHYSICAL EXAM  GENERAL: NAD  HEAD:  Atraumatic  EYES: EOMI, PERRLA, conjunctiva and sclera clear  NECK: Supple, No JVD  CHEST/LUNG: Clear to auscultation bilaterally; No wheeze, on RA  HEART: Regular rate and rhythm; No murmurs, rubs, or gallops  ABDOMEN: Soft, Nontender, Nondistended; Bowel sounds present, no guarding  EXTREMITIES:  2+ Peripheral Pulses, No clubbing, cyanosis, or edema  NEURO/PSYCH: AAOx3, doing taxes, clear speech, nonfocal  SKIN: No rashes or lesions      LABS:                        11.7   5.51  )-----------( 127      ( 14 Sep 2019 10:24 )             33.9     Hemoglobin: 11.7 g/dL (09-14 @ 10:24)  Hemoglobin: 12.1 g/dL (09-13 @ 10:09)  Hemoglobin: 13.8 g/dL (09-12 @ 00:42)  Hemoglobin: 13.6 g/dL (09-11 @ 00:43)    CBC Full  -  ( 14 Sep 2019 10:24 )  WBC Count : 5.51 K/uL  RBC Count : 3.17 M/uL  Hemoglobin : 11.7 g/dL  Hematocrit : 33.9 %  Platelet Count - Automated : 127 K/uL  Mean Cell Volume : 106.9 fl  Mean Cell Hemoglobin : 36.9 pg  Mean Cell Hemoglobin Concentration : 34.5 gm/dL  Auto Neutrophil # : x  Auto Lymphocyte # : x  Auto Monocyte # : x  Auto Eosinophil # : x  Auto Basophil # : x  Auto Neutrophil % : x  Auto Lymphocyte % : x  Auto Monocyte % : x  Auto Eosinophil % : x  Auto Basophil % : x    09-14    136  |  103  |  30<H>  ----------------------------<  212<H>  3.3<L>   |  23  |  1.36<H>    Ca    8.9      14 Sep 2019 07:12  Phos  2.6     09-14  Mg     1.9     09-14      Creatinine Trend: 1.36<--, 1.55<--, 1.16<--, 1.13<--, 1.01<--, 1.14<--      EKG:   MICROBIOLOGY:    IMAGING:      Labs, imaging, EKG personally reviewed     CONSULTS:

## 2019-09-15 NOTE — PROGRESS NOTE ADULT - PROBLEM SELECTOR PLAN 2
As above, FSG q6h.  - 1 amp D50 if BG <90 Bladder scan showed >900cc retained urine, failed TOV now with new parekh. Urinary retention likely due to constipation  -Pt having BM, continue aggressive bowel regimen  -Consider TOV tomorrow 9/16

## 2019-09-15 NOTE — PROGRESS NOTE ADULT - PROBLEM SELECTOR PLAN 7
Required Heimlich maneuver 2 times in past month  - Passed swallow study, able to take PO meds and meals without restriction Required Heimlich maneuver 2 times in past month  - Passed swallow study, able to take PO meds and meals without restriction  Metastatic to liver and lung. s/p Xeroda and Avastin  - Currently stable  - Holding Chemo meds

## 2019-09-15 NOTE — PROGRESS NOTE ADULT - PROBLEM SELECTOR PLAN 9
Wife today brought updated medication list. Updated home medications in chart.  - Added Pravastatin 20 mg (started atorvastatin 10 mg in hospital)  - Added Spironolactone 50 mg BID - holding for DERECK/retention  - Added potassium chloride 40 mg BID  - Changed Synthroid dose to 150 mcg, which is home dose.

## 2019-09-15 NOTE — PROGRESS NOTE ADULT - PROBLEM SELECTOR PLAN 1
Patient admitted to MICU, required intubation for concern for inability to protect airway. Intubated 9/9, extubated 9/11. Mental status improved. EEG w/o evidence of seizures. CT, MRI negative for cause. FSGs were in 200s by EMS, though this was after glucose administration. Likely etiology is hypoglycemia, as patient has had multiple episodes of hypoglycemia causing encephalopathy and hospital presentation. Patient may have labile blood sugars. Receiving 8u Lantus last night. Lantus 14u at home. Avoid hypoglycemia. Became confused and hallucinated overnight.  - Endocrinology following, appreciate recs  - 8u Lantus tonight  - 2 u Humalog at meals  - Goal -200s  - Monitor neuro status. BL neuro exam nonfocal.  - AM Cortisol negative  - On Ativan 1.5 mg QHS at home, give 1mg qhs. Patient admitted to MICU, required intubation for concern for inability to protect airway. Intubated 9/9, extubated 9/11. Mental status improved suspect was due to relative hypoglycemia (which family reports has happened in past) and urinary retention. EEG w/o evidence of seizures. CT, MRI negative for cause. FSGs were in 200s by EMS, though this was after glucose administration. Likely etiology is hypoglycemia, as patient has had multiple episodes of hypoglycemia causing encephalopathy and hospital presentation. Patient may have labile blood sugars. Receiving 8u Lantus last night. Lantus 14u at home. Avoid hypoglycemia. Became confused and hallucinated overnight.  - Endocrinology following, appreciate recs  - 8u Lantus tonight  - 2 u Humalog at meals  - Goal -200s  - Monitor neuro status. BL neuro exam nonfocal, AO x 3.  - AM Cortisol negative  - On Ativan 1.5 mg QHS at home, give 0.5mg po qhs.  -parekh for urinary retention

## 2019-09-15 NOTE — PROGRESS NOTE ADULT - PROBLEM SELECTOR PLAN 4
On 137.5 mcg levothyroxine at home. TSH 14.1 with T4 1.2, possibly sick euthyroid. Repeat TSH 18.2  - Endocrinology following, appreciate recs  - Home dose levothyroxine 137 mcg Resolved. New problem. Patient NPO yesterday, pre-renal is most likely etiology given FeNa. Not on new nephrotoxic medications. Possibly obstructive/post-renal as well. Cr decreasing today.  - - straight cath for now - likely multifactorial - immobility, constipation   monitor urine output - bowel regimen, increase mobility. Difficulty doing straight cath this morning, will place Rahman or Coude catheter per Urology recs  - Small bolus IVF  - Senna, docusate, miralax for bowel regimen

## 2019-09-15 NOTE — DISCHARGE NOTE PROVIDER - NSDCFUADDAPPT_GEN_ALL_CORE_FT
Please schedule a hospital follow up appoint with Dr. Roman within 1 week at [phone]    Please schedule an appointment with Dr. Larson or any provider anytime between Friday 9/19-9/25 to have your parekh removed and follow up for your urinary retention.

## 2019-09-15 NOTE — PROGRESS NOTE ADULT - PROBLEM SELECTOR PLAN 5
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 s/p Xeroda and Avastin  - Currently stable  - Holding Chemo meds On 137.5 mcg levothyroxine at home. TSH 14.1 with T4 1.2, possibly sick euthyroid. Repeat TSH 18.2  - Endocrinology following, appreciate recs  - Per endo, continue home dose levothyroxine 137 mcg

## 2019-09-16 DIAGNOSIS — E03.9 HYPOTHYROIDISM, UNSPECIFIED: ICD-10-CM

## 2019-09-16 LAB
ANION GAP SERPL CALC-SCNC: 10 MMOL/L — SIGNIFICANT CHANGE UP (ref 5–17)
BASOPHILS # BLD AUTO: 0.02 K/UL — SIGNIFICANT CHANGE UP (ref 0–0.2)
BASOPHILS NFR BLD AUTO: 0.4 % — SIGNIFICANT CHANGE UP (ref 0–2)
BUN SERPL-MCNC: 19 MG/DL — SIGNIFICANT CHANGE UP (ref 7–23)
CALCIUM SERPL-MCNC: 8.3 MG/DL — LOW (ref 8.4–10.5)
CHLORIDE SERPL-SCNC: 105 MMOL/L — SIGNIFICANT CHANGE UP (ref 96–108)
CO2 SERPL-SCNC: 23 MMOL/L — SIGNIFICANT CHANGE UP (ref 22–31)
CREAT SERPL-MCNC: 0.93 MG/DL — SIGNIFICANT CHANGE UP (ref 0.5–1.3)
EOSINOPHIL # BLD AUTO: 0.26 K/UL — SIGNIFICANT CHANGE UP (ref 0–0.5)
EOSINOPHIL NFR BLD AUTO: 4.6 % — SIGNIFICANT CHANGE UP (ref 0–6)
GLUCOSE BLDC GLUCOMTR-MCNC: 180 MG/DL — HIGH (ref 70–99)
GLUCOSE BLDC GLUCOMTR-MCNC: 220 MG/DL — HIGH (ref 70–99)
GLUCOSE BLDC GLUCOMTR-MCNC: 261 MG/DL — HIGH (ref 70–99)
GLUCOSE BLDC GLUCOMTR-MCNC: 345 MG/DL — HIGH (ref 70–99)
GLUCOSE SERPL-MCNC: 188 MG/DL — HIGH (ref 70–99)
HCT VFR BLD CALC: 34.6 % — LOW (ref 39–50)
HGB BLD-MCNC: 12 G/DL — LOW (ref 13–17)
IMM GRANULOCYTES NFR BLD AUTO: 0.4 % — SIGNIFICANT CHANGE UP (ref 0–1.5)
LYMPHOCYTES # BLD AUTO: 0.96 K/UL — LOW (ref 1–3.3)
LYMPHOCYTES # BLD AUTO: 17 % — SIGNIFICANT CHANGE UP (ref 13–44)
MAGNESIUM SERPL-MCNC: 1.7 MG/DL — SIGNIFICANT CHANGE UP (ref 1.6–2.6)
MCHC RBC-ENTMCNC: 34.7 GM/DL — SIGNIFICANT CHANGE UP (ref 32–36)
MCHC RBC-ENTMCNC: 37.3 PG — HIGH (ref 27–34)
MCV RBC AUTO: 107.5 FL — HIGH (ref 80–100)
MONOCYTES # BLD AUTO: 0.75 K/UL — SIGNIFICANT CHANGE UP (ref 0–0.9)
MONOCYTES NFR BLD AUTO: 13.3 % — SIGNIFICANT CHANGE UP (ref 2–14)
NEUTROPHILS # BLD AUTO: 3.65 K/UL — SIGNIFICANT CHANGE UP (ref 1.8–7.4)
NEUTROPHILS NFR BLD AUTO: 64.3 % — SIGNIFICANT CHANGE UP (ref 43–77)
PHOSPHATE SERPL-MCNC: 2.8 MG/DL — SIGNIFICANT CHANGE UP (ref 2.5–4.5)
PLATELET # BLD AUTO: 111 K/UL — LOW (ref 150–400)
POTASSIUM SERPL-MCNC: 3.8 MMOL/L — SIGNIFICANT CHANGE UP (ref 3.5–5.3)
POTASSIUM SERPL-SCNC: 3.8 MMOL/L — SIGNIFICANT CHANGE UP (ref 3.5–5.3)
RBC # BLD: 3.22 M/UL — LOW (ref 4.2–5.8)
RBC # FLD: 15.5 % — HIGH (ref 10.3–14.5)
SODIUM SERPL-SCNC: 138 MMOL/L — SIGNIFICANT CHANGE UP (ref 135–145)
WBC # BLD: 5.66 K/UL — SIGNIFICANT CHANGE UP (ref 3.8–10.5)
WBC # FLD AUTO: 5.66 K/UL — SIGNIFICANT CHANGE UP (ref 3.8–10.5)

## 2019-09-16 PROCEDURE — 99232 SBSQ HOSP IP/OBS MODERATE 35: CPT

## 2019-09-16 PROCEDURE — 99233 SBSQ HOSP IP/OBS HIGH 50: CPT | Mod: GC

## 2019-09-16 RX ORDER — INSULIN GLARGINE 100 [IU]/ML
5 INJECTION, SOLUTION SUBCUTANEOUS AT BEDTIME
Refills: 0 | Status: DISCONTINUED | OUTPATIENT
Start: 2019-09-16 | End: 2019-09-17

## 2019-09-16 RX ORDER — INSULIN LISPRO 100/ML
3 VIAL (ML) SUBCUTANEOUS
Refills: 0 | Status: DISCONTINUED | OUTPATIENT
Start: 2019-09-16 | End: 2019-09-16

## 2019-09-16 RX ORDER — INSULIN LISPRO 100/ML
5 VIAL (ML) SUBCUTANEOUS
Refills: 0 | Status: DISCONTINUED | OUTPATIENT
Start: 2019-09-16 | End: 2019-09-18

## 2019-09-16 RX ORDER — DOCUSATE SODIUM 100 MG
100 CAPSULE ORAL THREE TIMES A DAY
Refills: 0 | Status: DISCONTINUED | OUTPATIENT
Start: 2019-09-16 | End: 2019-09-18

## 2019-09-16 RX ORDER — AMLODIPINE BESYLATE 2.5 MG/1
2.5 TABLET ORAL DAILY
Refills: 0 | Status: DISCONTINUED | OUTPATIENT
Start: 2019-09-16 | End: 2019-09-18

## 2019-09-16 RX ADMIN — Medication 2 UNIT(S): at 08:16

## 2019-09-16 RX ADMIN — Medication 5 UNIT(S): at 18:04

## 2019-09-16 RX ADMIN — CARVEDILOL PHOSPHATE 25 MILLIGRAM(S): 80 CAPSULE, EXTENDED RELEASE ORAL at 05:51

## 2019-09-16 RX ADMIN — Medication 3 MILLIGRAM(S): at 22:35

## 2019-09-16 RX ADMIN — Medication 100 MILLIGRAM(S): at 05:51

## 2019-09-16 RX ADMIN — Medication 3: at 18:04

## 2019-09-16 RX ADMIN — SENNA PLUS 2 TABLET(S): 8.6 TABLET ORAL at 22:35

## 2019-09-16 RX ADMIN — TAMSULOSIN HYDROCHLORIDE 0.4 MILLIGRAM(S): 0.4 CAPSULE ORAL at 22:35

## 2019-09-16 RX ADMIN — Medication 100 MILLIGRAM(S): at 17:07

## 2019-09-16 RX ADMIN — Medication 100 MILLIGRAM(S): at 12:38

## 2019-09-16 RX ADMIN — Medication 0.5 MILLIGRAM(S): at 22:38

## 2019-09-16 RX ADMIN — POLYETHYLENE GLYCOL 3350 17 GRAM(S): 17 POWDER, FOR SOLUTION ORAL at 12:38

## 2019-09-16 RX ADMIN — CARVEDILOL PHOSPHATE 25 MILLIGRAM(S): 80 CAPSULE, EXTENDED RELEASE ORAL at 17:07

## 2019-09-16 RX ADMIN — ATORVASTATIN CALCIUM 10 MILLIGRAM(S): 80 TABLET, FILM COATED ORAL at 22:35

## 2019-09-16 RX ADMIN — Medication 4: at 12:48

## 2019-09-16 RX ADMIN — Medication 1 TABLET(S): at 12:38

## 2019-09-16 RX ADMIN — Medication 1: at 08:17

## 2019-09-16 RX ADMIN — Medication 150 MICROGRAM(S): at 05:51

## 2019-09-16 RX ADMIN — Medication 20 MILLIGRAM(S): at 22:35

## 2019-09-16 RX ADMIN — ENOXAPARIN SODIUM 40 MILLIGRAM(S): 100 INJECTION SUBCUTANEOUS at 12:38

## 2019-09-16 RX ADMIN — Medication 3 UNIT(S): at 12:48

## 2019-09-16 RX ADMIN — INSULIN GLARGINE 5 UNIT(S): 100 INJECTION, SOLUTION SUBCUTANEOUS at 22:35

## 2019-09-16 RX ADMIN — Medication 100 MILLIGRAM(S): at 22:35

## 2019-09-16 RX ADMIN — Medication 5 MILLIGRAM(S): at 16:12

## 2019-09-16 NOTE — PROGRESS NOTE ADULT - PROBLEM SELECTOR PLAN 9
Wife today brought updated medication list. Updated home medications in chart.  - Added Pravastatin 20 mg (started atorvastatin 10 mg in hospital)  - Added Spironolactone 50 mg BID - holding for DERECK/retention  - Added potassium chloride 40 mg BID  - Changed Synthroid dose to 150 mcg, which is home dose. DVT PPx: Lovenox  Diet: CC/DASH  Dispo: home PT

## 2019-09-16 NOTE — PROGRESS NOTE ADULT - PROBLEM SELECTOR PLAN 3
- 1 amp D50 if BG <90  -Continue lantus 6u qhs, humalog 2u premeal  -Endocrine following, C-peptide low 0.2, endocrine to decide home medication regimen for discharge - 1 amp D50 if BG <90  -Continue lantus 5u qhs, humalog 5u premeal, per endo  -Endocrine following, C-peptide low 0.2, endocrine to decide home medication regimen for discharge  -C/w statin.

## 2019-09-16 NOTE — PROGRESS NOTE ADULT - SUBJECTIVE AND OBJECTIVE BOX
INTERVAL EVENTS:    SUBJECTIVE: VASYL.  Feels back to normal.  Agrees to TOV today    ROS:  General - No fevers or chills  Cards - No chest pain or palpitations  Pulm - No cough or shortness of breath  GI - No abdominal pain   - No dysuria or hematuria    OBJECTIVE:  Vitals Last 24 hrs  Vital Signs Last 24 Hrs  T(C): 36.4 (16 Sep 2019 04:39), Max: 37.2 (15 Sep 2019 19:40)  T(F): 97.6 (16 Sep 2019 04:39), Max: 98.9 (15 Sep 2019 19:40)  HR: 60 (16 Sep 2019 04:39) (53 - 60)  BP: 144/66 (16 Sep 2019 04:39) (128/68 - 165/75)  BP(mean): --  RR: 18 (16 Sep 2019 04:39) (18 - 18)  SpO2: 96% (16 Sep 2019 04:39) (96% - 97%)    IOs  I&O's Summary    15 Sep 2019 07:01  -  16 Sep 2019 07:00  --------------------------------------------------------  IN: 1210 mL / OUT: 975 mL / NET: 235 mL        PE:  General -   Heart - Normal S1 and S2. No murmurs auscultated.  Lungs - Clear to auscultation bilaterally  GI - Abdomen soft, NT, ND.  Extremities - No lower extremity edema. Peripheral pulses intact. Calves nontender.  Skin - No rashes on extremities    LABS:  09-16    138  |  105  |  19  ----------------------------<  188<H>  3.8   |  23  |  0.93    Ca    8.3<L>      16 Sep 2019 07:08  Phos  2.8     09-16  Mg     1.7     09-16                              11.6   6.04  )-----------( 124      ( 15 Sep 2019 11:33 )             33.5           IMAGING: No new imaging INTERVAL EVENTS:    SUBJECTIVE: VASYL.  Feels back to normal.  Agrees to TOV today    ROS:  General - No fevers or chills  Cards - No chest pain or palpitations  Pulm - No cough or shortness of breath  GI - No abdominal pain   - No dysuria or hematuria    OBJECTIVE:  Vitals Last 24 hrs  Vital Signs Last 24 Hrs  T(C): 36.4 (16 Sep 2019 04:39), Max: 37.2 (15 Sep 2019 19:40)  T(F): 97.6 (16 Sep 2019 04:39), Max: 98.9 (15 Sep 2019 19:40)  HR: 60 (16 Sep 2019 04:39) (53 - 60)  BP: 144/66 (16 Sep 2019 04:39) (128/68 - 165/75)  BP(mean): --  RR: 18 (16 Sep 2019 04:39) (18 - 18)  SpO2: 96% (16 Sep 2019 04:39) (96% - 97%)    IOs  I&O's Summary    15 Sep 2019 07:01  -  16 Sep 2019 07:00  --------------------------------------------------------  IN: 1210 mL / OUT: 975 mL / NET: 235 mL        PE:  General - No acute distress. Sitting up in bed.  Heart - Normal S1 and S2. No murmurs auscultated.  Lungs - Clear to auscultation bilaterally  GI - Abdomen soft, NT, ND. Palpable device in LLQ.  Extremities - No lower extremity edema. Peripheral pulses intact. Calves nontender.  - Rahman draining jhonatan urine  Skin - No rashes on extremities    LABS:  09-16    138  |  105  |  19  ----------------------------<  188<H>  3.8   |  23  |  0.93    Ca    8.3<L>      16 Sep 2019 07:08  Phos  2.8     09-16  Mg     1.7     09-16                              11.6   6.04  )-----------( 124      ( 15 Sep 2019 11:33 )             33.5           IMAGING: No new imaging INTERVAL EVENTS: VASYL.    SUBJECTIVE:  Feels back to normal.  Agrees to TOV today    ROS:  General - No fevers or chills  Cards - No chest pain or palpitations  Pulm - No cough or shortness of breath  GI - No abdominal pain   - No dysuria or hematuria    OBJECTIVE:  Vitals Last 24 hrs  Vital Signs Last 24 Hrs  T(C): 36.4 (16 Sep 2019 04:39), Max: 37.2 (15 Sep 2019 19:40)  T(F): 97.6 (16 Sep 2019 04:39), Max: 98.9 (15 Sep 2019 19:40)  HR: 60 (16 Sep 2019 04:39) (53 - 60)  BP: 144/66 (16 Sep 2019 04:39) (128/68 - 165/75)  BP(mean): --  RR: 18 (16 Sep 2019 04:39) (18 - 18)  SpO2: 96% (16 Sep 2019 04:39) (96% - 97%)    IOs  I&O's Summary    15 Sep 2019 07:01  -  16 Sep 2019 07:00  --------------------------------------------------------  IN: 1210 mL / OUT: 975 mL / NET: 235 mL        PE:  General - No acute distress. Sitting up in bed.  Heart - Normal S1 and S2. No murmurs auscultated.  Lungs - Clear to auscultation bilaterally  GI - Abdomen soft, NT, ND. Palpable device in LLQ.  Extremities - No lower extremity edema. Peripheral pulses intact. Calves nontender.  - Rahman draining jhonatan urine  Skin - No rashes on extremities    LABS:  09-16    138  |  105  |  19  ----------------------------<  188<H>  3.8   |  23  |  0.93    Ca    8.3<L>      16 Sep 2019 07:08  Phos  2.8     09-16  Mg     1.7     09-16                              11.6   6.04  )-----------( 124      ( 15 Sep 2019 11:33 )             33.5           IMAGING: No new imaging

## 2019-09-16 NOTE — PROGRESS NOTE ADULT - PROBLEM SELECTOR PLAN 1
Patient admitted to MICU, required intubation for concern for inability to protect airway. Intubated 9/9, extubated 9/11. Mental status improved suspect was due to relative hypoglycemia (which family reports has happened in past) and urinary retention. EEG w/o evidence of seizures. CT, MRI negative for cause. FSGs were in 200s by EMS, though this was after glucose administration. Likely etiology is hypoglycemia, as patient has had multiple episodes of hypoglycemia causing encephalopathy and hospital presentation. Patient may have labile blood sugars. Receiving 8u Lantus last night. Lantus 14u at home. Avoid hypoglycemia. Became confused and hallucinated overnight.  - Endocrinology following, appreciate recs  - 8u Lantus tonight  - 2 u Humalog at meals  - Goal -200s  - Monitor neuro status. BL neuro exam nonfocal, AO x 3.  - AM Cortisol negative  - On Ativan 1.5 mg QHS at home, give 0.5mg po qhs.  -parekh for urinary retention Patient admitted to MICU, required intubation for concern for inability to protect airway. Intubated 9/9, extubated 9/11. Mental status improved suspect was due to relative hypoglycemia (which family reports has happened in past) and urinary retention. EEG w/o evidence of seizures. CT, MRI negative for cause. FSGs were in 200s by EMS, though this was after glucose administration. Likely etiology is hypoglycemia, as patient has had multiple episodes of hypoglycemia causing encephalopathy and hospital presentation. Patient may have labile blood sugars. Receiving 8u Lantus last night. Lantus 14u at home. Avoid hypoglycemia. Became confused and hallucinated overnight.  - Endocrinology following, appreciate recs  - 6u Lantus tonight  - 2 u Humalog at meals  - Goal -200s  - Monitor neuro status. BL neuro exam nonfocal, AO x 3.  - AM Cortisol negative  - On Ativan 1.5 mg QHS at home, give 0.5mg po qhs.  -parekh for urinary retention Patient admitted to MICU, required intubation for concern for inability to protect airway. Intubated 9/9, extubated 9/11. Mental status improved suspect was due to relative hypoglycemia (which family reports has happened in past) and urinary retention. EEG w/o evidence of seizures. CT, MRI negative for cause. FSGs were in 200s by EMS, though this was after glucose administration. Likely etiology is hypoglycemia, as patient has had multiple episodes of hypoglycemia causing encephalopathy and hospital presentation. Patient may have labile blood sugars.   - Lantus 14u at home. Avoid hypoglycemia.  - Endocrinology following, appreciate recs  - 5u Lantus tonight, per endo  - 5 u Humalog at meals, per endo  - Goal -200s  - Monitor neuro status. BL neuro exam nonfocal, AO x 3.  - AM Cortisol within normal  - On Ativan 1.5 mg QHS at home, give 0.5mg po qhs.  -parekh for urinary retention -Will attempt TOV today.

## 2019-09-16 NOTE — PROGRESS NOTE ADULT - PROBLEM SELECTOR PLAN 6
On losartan 100 mg, carvedilol 25 mg, amlodipine 2.5 mg, lasix 20 mg at home  - Now able to take PO meds  - Start losartan and carvedilol, hold amlodipine and lasix On losartan 100 mg, carvedilol 25 mg, amlodipine 2.5 mg, lasix 20 mg, and spironolactone at home  - Now able to take PO meds  - C/w amlodipine and carvedilol.  -Holding losartan, spironolactone, and lasix in view of recent DERECK.

## 2019-09-16 NOTE — PROGRESS NOTE ADULT - PROBLEM SELECTOR PLAN 4
Resolved. New problem. Patient NPO yesterday, pre-renal is most likely etiology given FeNa. Not on new nephrotoxic medications. Possibly obstructive/post-renal as well. Cr decreasing today.  - - straight cath for now - likely multifactorial - immobility, constipation   monitor urine output - bowel regimen, increase mobility. Difficulty doing straight cath this morning, will place Rahman or Coude catheter per Urology recs  - Small bolus IVF  - Senna, docusate, miralax for bowel regimen Resolved. Pre-renal is most likely etiology given FeNa. Not on new nephrotoxic medications. Possibly obstructive/post-renal as well. Cr decreasing.  -Urinary retention s/p Rahman - likely multifactorial - immobility, constipation   monitor urine output - bowel regimen, increase mobility.  - Senna, docusate, miralax for bowel regimen

## 2019-09-16 NOTE — PROGRESS NOTE ADULT - ASSESSMENT
79 M w/h/o tightly controlled T2DM with frequent hypoglycemia. Also h/o  metastatic colon Ca, HTN, HLD, hypothyroidism, acromegaly on Sandostatin, here after found by wife unresponsive and then with AMS due to hypoglycemic episode requiring intubation for agitation and urgent CT head, now s/p extubation and pt stable. Tolerating POs with glycemic control not at goal. Persistent prandial hyperglycemia and FBG going down from HS value >100 pts last night. Will decrease lantus dose further  and increase pre meal insulin as well. Appears that basal insulin was covering premeal needs at home causing hypoglycemia whenever pt didn't eat on time or enough. Glycemic goal for this pt's age and comorbidities is 100s to low 200s and a1c 7% -8%. Due to low C-peptide levels pt will need to continue basal/bolus therapy with close out pt f/u

## 2019-09-16 NOTE — PROGRESS NOTE ADULT - PROBLEM SELECTOR PLAN 2
-Pt was not taking synthroid in an empty stomach so will continue present  Synthroid dose.  -Re test TSH in 4 to 6 weeks  -Plan discussed with pt/team.  Contact info: [phone] (24/7). pager [phone]

## 2019-09-16 NOTE — PROGRESS NOTE ADULT - ATTENDING COMMENTS
-Patient seen/examined on 9/16/19. Case/plan discussed with the intern and resident as reviewed/edited by me above and in any comments below.

## 2019-09-16 NOTE — PROGRESS NOTE ADULT - PROBLEM SELECTOR PLAN 1
-Test BG ac and hs and 2am  -Decrease Lantus dose to 5 units q hs  -Increase Humalog dose to 5 units ac meals  -Pt to f/u with pvt endo Dr Fraire for further insulin adjustments and to consider CGM.  -Will wirte rx for Xeris Glucagon  ready pen upon discharge.  -Plan discussed with pt/team.  Contact info: [phone] (24/7). pager [phone]    -C/w low dose Humalog correction scales ac and hs  -

## 2019-09-16 NOTE — PROGRESS NOTE ADULT - PROBLEM SELECTOR PLAN 5
On 137.5 mcg levothyroxine at home. TSH 14.1 with T4 1.2, possibly sick euthyroid. Repeat TSH 18.2  - Endocrinology following, appreciate recs  - Per endo, continue home dose levothyroxine 137 mcg On 137.5 mcg levothyroxine at home. TSH 14.1 with T4 1.2, possibly sick euthyroid. Repeat TSH 18.2  - Endocrinology following, appreciate recs  - Continue home dose levothyroxine 150 mcg On 150 mcg levothyroxine at home. TSH 14.1 with T4 1.2, possibly sick euthyroid. Repeat TSH 18.2  - Endocrinology following, appreciate recs  - Continue levothyroxine 150 mcg

## 2019-09-16 NOTE — PROGRESS NOTE ADULT - PROBLEM SELECTOR PLAN 8
- on sandostatin monthly. Per endocrine will hold while admitted  - wife to notify due date for next dose   - Endocrine f/u - on sandostatin monthly. Per endocrine will hold while admitted  - Outpt endo f/u - on Sandostatin monthly. Per endocrine will hold while admitted  - Outpt endo f/u

## 2019-09-16 NOTE — PROGRESS NOTE ADULT - ASSESSMENT
79M pmh metastatic colon Ca, HTN, DM w/ frequent ED visits for hypoglycemia, hypothyroidism here for AMS w/ intubation for agitation and to allow CT head. Encephalopathy now resolved likely 2/2 relative hypoglycemia and urinary obstruction.

## 2019-09-16 NOTE — PROGRESS NOTE ADULT - SUBJECTIVE AND OBJECTIVE BOX
DIABETES FOLLOW UP NOTE: Saw pt earlier today  INTERVAL HX: 79 M w/h/o tightly controlled T2DM with frequent hypoglycemia. Also h/o  metastatic colon Ca, HTN, HLD, hypothyroidism, acromegaly on Sandostatin, here after found by wife unresponsive and then with AMS due to hypoglycemic episode requiring intubation for agitation and urgent CT head, now s/p extubation and pt stable. Pt reports tolerating POs with glycemic control not at goal. Noted fasting BG dropping from bedtime values >100 pts and persistent prandial hyperglycemia. Per wife pt has frequent hypoglycemic episodes in the mornings but in this case he was found unresponsive around 8pm before dinner. Wife states she has glucagon emergency kit but was too nervous and was unable to mix it. She gave pt glucose gel and pt reacted with agitation. Pt unable to recall what happened or what he did that day. No further hypoglycemia noted while in hospital. Increase humalog dose from 2 to 3 units ac meals. Noted that pt received total of 3 units with breakfast with f/u BG >300s this pm       Review of Systems:  General: As above  Cardiovascular: No chest pain, palpitations  Respiratory: No SOB, no cough  GI: No nausea, vomiting, abdominal pain  Endocrine: no polyuria, polydipsia or S&Sx of hypoglycemia    Allergies    No Known Allergies    Intolerances      MEDICATIONS:  atorvastatin 10 milliGRAM(s) Oral at bedtime  insulin glargine Injectable (LANTUS) 6 Unit(s) SubCutaneous at bedtime  insulin lispro (HumaLOG) corrective regimen sliding scale   SubCutaneous three times a day before meals  insulin lispro (HumaLOG) corrective regimen sliding scale   SubCutaneous at bedtime  insulin lispro Injectable (HumaLOG) 3 Unit(s) SubCutaneous three times a day before meals  levothyroxine 150 MICROGram(s) Oral daily    PHYSICAL EXAM:  VITALS: T(C): 36.6 (09-16-19 @ 11:00)  T(F): 97.9 (09-16-19 @ 11:00), Max: 98.9 (09-15-19 @ 19:40)  HR: 62 (09-16-19 @ 11:00) (56 - 62)  BP: 164/80 (09-16-19 @ 11:00) (144/66 - 165/75)  RR:  (18 - 18)  SpO2:  (96% - 97%)  Wt(kg): --  GENERAL: Male laying in bed  in NAD  Abdomen: Soft, nontender, non distended. SQ "chemo pump" in LLQ, Noted abdominal hernia as well. Pt states he injects insulin in thighs   Extremities: Warm, no edema in all 4 exts  NEURO: A&O X3    LABS:  POCT Blood Glucose.: 345 mg/dL (09-16-19 @ 12:28)  POCT Blood Glucose.: 180 mg/dL (09-16-19 @ 08:13)  POCT Blood Glucose.: 305 mg/dL (09-15-19 @ 21:59)  POCT Blood Glucose.: 284 mg/dL (09-15-19 @ 17:00)  POCT Blood Glucose.: 228 mg/dL (09-15-19 @ 12:24)  POCT Blood Glucose.: 149 mg/dL (09-15-19 @ 08:11)  POCT Blood Glucose.: 187 mg/dL (09-14-19 @ 22:06)  POCT Blood Glucose.: 138 mg/dL (09-14-19 @ 17:01)  POCT Blood Glucose.: 123 mg/dL (09-14-19 @ 12:15)  POCT Blood Glucose.: 152 mg/dL (09-14-19 @ 08:00)  POCT Blood Glucose.: 237 mg/dL (09-13-19 @ 22:09)  POCT Blood Glucose.: 141 mg/dL (09-13-19 @ 17:34)                            12.0   5.66  )-----------( 111      ( 16 Sep 2019 09:50 )             34.6       09-16    138  |  105  |  19  ----------------------------<  188<H>  3.8   |  23  |  0.93    EGFR if non : 77    Ca    8.3<L>      09-16  Mg     1.7     09-16  Phos  2.8     09-16        Thyroid Function Tests:  09-13 @ 10:06 TSH 18.20 FreeT4 -- T3 -- Anti TPO -- Anti Thyroglobulin Ab -- TSI --  09-11 @ 08:32 TSH 14.10 FreeT4 1.2 T3 -- Anti TPO -- Anti Thyroglobulin Ab -- TSI --      Hemoglobin A1C, Whole Blood: 7.0 % <H> [4.0 - 5.6] (09-13-19 @ 10:09)  Hemoglobin A1C, Whole Blood: 6.7 % <H> [4.0 - 5.6] (07-18-19 @ 09:50)  C-Peptide, Serum: 0.2 ng/mL (09-14-19 @ 09:57) with BG >200s

## 2019-09-16 NOTE — PROGRESS NOTE ADULT - PROBLEM SELECTOR PLAN 7
Required Heimlich maneuver 2 times in past month  - Passed swallow study, able to take PO meds and meals without restriction  Metastatic to liver and lung. s/p Xeroda and Avastin  - Currently stable  - Holding Chemo meds Required Heimlich maneuver 2 times in past month  - Passed swallow study, able to take PO meds and meals without restriction  Colon cancer metastatic to liver and lung. s/p Xeroda and Avastin  - Currently stable  - Holding Chemo meds

## 2019-09-17 LAB
ANION GAP SERPL CALC-SCNC: 9 MMOL/L — SIGNIFICANT CHANGE UP (ref 5–17)
BUN SERPL-MCNC: 16 MG/DL — SIGNIFICANT CHANGE UP (ref 7–23)
CALCIUM SERPL-MCNC: 8.3 MG/DL — LOW (ref 8.4–10.5)
CHLORIDE SERPL-SCNC: 106 MMOL/L — SIGNIFICANT CHANGE UP (ref 96–108)
CO2 SERPL-SCNC: 23 MMOL/L — SIGNIFICANT CHANGE UP (ref 22–31)
CREAT SERPL-MCNC: 0.88 MG/DL — SIGNIFICANT CHANGE UP (ref 0.5–1.3)
GLUCOSE BLDC GLUCOMTR-MCNC: 114 MG/DL — HIGH (ref 70–99)
GLUCOSE BLDC GLUCOMTR-MCNC: 126 MG/DL — HIGH (ref 70–99)
GLUCOSE BLDC GLUCOMTR-MCNC: 148 MG/DL — HIGH (ref 70–99)
GLUCOSE BLDC GLUCOMTR-MCNC: 169 MG/DL — HIGH (ref 70–99)
GLUCOSE BLDC GLUCOMTR-MCNC: 185 MG/DL — HIGH (ref 70–99)
GLUCOSE SERPL-MCNC: 160 MG/DL — HIGH (ref 70–99)
HCT VFR BLD CALC: 33.4 % — LOW (ref 39–50)
HGB BLD-MCNC: 11.7 G/DL — LOW (ref 13–17)
MAGNESIUM SERPL-MCNC: 1.6 MG/DL — SIGNIFICANT CHANGE UP (ref 1.6–2.6)
MCHC RBC-ENTMCNC: 35 GM/DL — SIGNIFICANT CHANGE UP (ref 32–36)
MCHC RBC-ENTMCNC: 36.9 PG — HIGH (ref 27–34)
MCV RBC AUTO: 105.4 FL — HIGH (ref 80–100)
PHOSPHATE SERPL-MCNC: 2.6 MG/DL — SIGNIFICANT CHANGE UP (ref 2.5–4.5)
PLATELET # BLD AUTO: 128 K/UL — LOW (ref 150–400)
POTASSIUM SERPL-MCNC: 3.6 MMOL/L — SIGNIFICANT CHANGE UP (ref 3.5–5.3)
POTASSIUM SERPL-SCNC: 3.6 MMOL/L — SIGNIFICANT CHANGE UP (ref 3.5–5.3)
RBC # BLD: 3.17 M/UL — LOW (ref 4.2–5.8)
RBC # FLD: 15.2 % — HIGH (ref 10.3–14.5)
SODIUM SERPL-SCNC: 138 MMOL/L — SIGNIFICANT CHANGE UP (ref 135–145)
WBC # BLD: 6.19 K/UL — SIGNIFICANT CHANGE UP (ref 3.8–10.5)
WBC # FLD AUTO: 6.19 K/UL — SIGNIFICANT CHANGE UP (ref 3.8–10.5)

## 2019-09-17 PROCEDURE — 99232 SBSQ HOSP IP/OBS MODERATE 35: CPT | Mod: GC

## 2019-09-17 PROCEDURE — 99232 SBSQ HOSP IP/OBS MODERATE 35: CPT

## 2019-09-17 RX ORDER — INSULIN GLARGINE 100 [IU]/ML
4 INJECTION, SOLUTION SUBCUTANEOUS AT BEDTIME
Refills: 0 | Status: DISCONTINUED | OUTPATIENT
Start: 2019-09-17 | End: 2019-09-18

## 2019-09-17 RX ORDER — TAMSULOSIN HYDROCHLORIDE 0.4 MG/1
0.8 CAPSULE ORAL AT BEDTIME
Refills: 0 | Status: DISCONTINUED | OUTPATIENT
Start: 2019-09-17 | End: 2019-09-18

## 2019-09-17 RX ADMIN — Medication 3 MILLIGRAM(S): at 21:14

## 2019-09-17 RX ADMIN — Medication 0.5 MILLIGRAM(S): at 21:14

## 2019-09-17 RX ADMIN — ENOXAPARIN SODIUM 40 MILLIGRAM(S): 100 INJECTION SUBCUTANEOUS at 12:50

## 2019-09-17 RX ADMIN — Medication 100 MILLIGRAM(S): at 12:49

## 2019-09-17 RX ADMIN — INSULIN GLARGINE 4 UNIT(S): 100 INJECTION, SOLUTION SUBCUTANEOUS at 22:43

## 2019-09-17 RX ADMIN — ATORVASTATIN CALCIUM 10 MILLIGRAM(S): 80 TABLET, FILM COATED ORAL at 21:14

## 2019-09-17 RX ADMIN — CARVEDILOL PHOSPHATE 25 MILLIGRAM(S): 80 CAPSULE, EXTENDED RELEASE ORAL at 17:35

## 2019-09-17 RX ADMIN — POLYETHYLENE GLYCOL 3350 17 GRAM(S): 17 POWDER, FOR SOLUTION ORAL at 12:49

## 2019-09-17 RX ADMIN — Medication 1 TABLET(S): at 12:50

## 2019-09-17 RX ADMIN — Medication 100 MILLIGRAM(S): at 05:28

## 2019-09-17 RX ADMIN — Medication 150 MICROGRAM(S): at 05:28

## 2019-09-17 RX ADMIN — Medication 5 UNIT(S): at 12:51

## 2019-09-17 RX ADMIN — Medication 100 MILLIGRAM(S): at 21:14

## 2019-09-17 RX ADMIN — Medication 20 MILLIGRAM(S): at 21:14

## 2019-09-17 RX ADMIN — Medication 5 UNIT(S): at 17:32

## 2019-09-17 RX ADMIN — Medication 1: at 12:51

## 2019-09-17 RX ADMIN — Medication 5 UNIT(S): at 08:21

## 2019-09-17 RX ADMIN — CARVEDILOL PHOSPHATE 25 MILLIGRAM(S): 80 CAPSULE, EXTENDED RELEASE ORAL at 05:28

## 2019-09-17 RX ADMIN — Medication 100 MILLIGRAM(S): at 12:51

## 2019-09-17 RX ADMIN — AMLODIPINE BESYLATE 2.5 MILLIGRAM(S): 2.5 TABLET ORAL at 05:28

## 2019-09-17 RX ADMIN — TAMSULOSIN HYDROCHLORIDE 0.8 MILLIGRAM(S): 0.4 CAPSULE ORAL at 21:14

## 2019-09-17 RX ADMIN — SENNA PLUS 2 TABLET(S): 8.6 TABLET ORAL at 21:14

## 2019-09-17 NOTE — PROGRESS NOTE ADULT - PROBLEM SELECTOR PLAN 2
New onset with admission. Constipation contributing  -Had a bowel movement with tap water enema before ToV  -C/w tamsulosin.   -TOV today New onset with admission. Constipation contributing  -Had a bowel movement with tap water enema before ToV  -C/w tamsulosin, will increase to 0.8mg QHS.   -TOV today

## 2019-09-17 NOTE — PROGRESS NOTE ADULT - PROBLEM SELECTOR PLAN 5
TSH 14.1 with T4 1.2. Repeat TSH 18.2. Likely sick euthyroid  - Continue home levothyroxine 150 mcg  - Endocrinology following, appreciate recs

## 2019-09-17 NOTE — PROGRESS NOTE ADULT - PROBLEM SELECTOR PLAN 1
-Test BG ac and hs and 2am  -Decrease Lantus dose to 4 units q hs  -C/w Humalog dose to 5 units ac meals  -Pt to f/u with pvt endo Dr Fraire for further insulin adjustments and to consider CGM.  -Will write rx for Xeris Glucagon ready pen upon discharge.  -Plan discussed with pt/team.  Contact info: [phone] (24/7). pager [phone]    -C/w low dose Humalog correction scales ac and hs  - -Test BG ac and hs and 2am  -Decrease Lantus dose to 4 units q hs  -C/w Humalog dose 5 units ac meals  -Continue low dose Humalog correction scales ac and hs  -Upon discharge continue above regimen but pt uses Basaglar instead of Lantus and Novolog instead of Humalog at home (same doses as above)  -No need for Humalog correction scales upon discharge   -Pt to f/u with pvt endo Dr Fraire for further insulin adjustments and to consider CGM.  -Rx for Xeris Glucagon ready pen upon discharge.  -Plan discussed with pt/team.  Contact info: [phone] (24/7). pager [phone]

## 2019-09-17 NOTE — PROGRESS NOTE ADULT - ATTENDING COMMENTS
-Patient seen/examined on 9/17/19. Case/plan discussed with the intern and resident as reviewed/edited by me above and in any comments below.  -Discussed with patient and his wife at bedside.   -Endo recs appreciated.   -TOV continuing.   -DCP soon.

## 2019-09-17 NOTE — PROGRESS NOTE ADULT - PROBLEM SELECTOR PROBLEM 7
CC: Weakness    HPI:  89 y/o F with PMH of CAD s/p PCI, HTN, dyslipidemia, hypothyroidism, bladder cancer s/p resection, anxiety, orthostatic hypotension, DJD multiple joints, anxiety, uterine cancer s/p hysterectomy, p/w weakness. Today patient was unable to get up from bed and had difficulty walking due to weakness. Daughter called doctor who thought she may have a UTI and prescribed outpatient medications. However, patient was so weak, they came to ED. Patient also c/o dysuria and increased urinary frequency. +Suprapubic pain. Denies flank pain, fever, chills, nausea, vomiting, cough, runny nose, sore throat, SOB, CP.  Pt was admitted to medicine for evaluation of her UTI. Urology and ID were involved. As her verma was recently removed, PVR was checked to ensure urinary retention not contributing to UTI.    6/21: Pt complaining of generalized aches/pains from arthritis  She still feels very weak and does not feel she can go home due to this.  She feels she will be back in 3 days if she leaves now.  She is being treated for enterobacter UTI.  Otherwise HD stable.  No fever, chills, n, v.    6/22: Pt states she still have hematuria, still feeling very weak.  No fever, chills, n, v.  6/23: Rapid response called this AM due to unresponsiveness in the bathroom.  Pt was about to have a bowel movement when suddently became unresponsive.  She was brought to bed where mentation improved to baseline.  Currently pt alert, comfortable, no fever, chills, n, v.  Verma in place due to urinary retention.    6/24: Pt still very weak.  Verma now in place for retention.  Pt s/p BM yesterday.  At this time mentation at baseline, HD stable.  She was evaluated by cardiologist and started on losartan.  Metoprolol decreased from BID to qhs.  Pt has labile BP and prone to orthostasis though in general her SBP runs high.  She will be going to [facility].  Per urology she is to maintain her verma and follow up outpatient for re-evaluation.    6/25: Pt had episode of abd discomfort this AM due to verma malfunction which was fixed, now draining well.  Pt with several complaints of weakness, shoulder stiffness and difficulty ambulating.  She feels very frustrated and does not feel she should be discharged to rehab at this time despite multiple attempts to reassure her that this is the best plan for her.  Pt otherwise HD stable.    REVIEW OF SYSTEMS: All other review of systems is negative unless indicated above.    Vital Signs Last 24 Hrs  T(C): 36.7 (25 Jun 2019 13:05), Max: 36.8 (24 Jun 2019 21:55)  T(F): 98 (25 Jun 2019 13:05), Max: 98.3 (25 Jun 2019 05:08)  HR: 80 (25 Jun 2019 13:05) (71 - 80)  BP: 132/39 (25 Jun 2019 13:05) (123/46 - 180/51)  BP(mean): --  RR: 18 (25 Jun 2019 13:05) (17 - 18)  SpO2: 100% (25 Jun 2019 13:05) (98% - 100%)    PHYSICAL EXAM:    Constitutional: NAD, awake and alert, weak appearing, frail  HEENT: PERR, EOMI, Normal Hearing, MMM  Neck: Soft and supple  Respiratory: Breath sounds are clear bilaterally, No wheezing, rales or rhonchi  Cardiovascular: S1 and S2, regular rate and rhythm, no Murmurs, gallops or rubs  Gastrointestinal: Bowel Sounds present, soft, nontender, nondistended, no guarding, no rebound  Extremities: No peripheral edema  Neurological: A/O x 3, no focal deficits in my limited exam  Skin: No rashes    MEDICATIONS  (STANDING):  acetaminophen   Tablet .. 650 milliGRAM(s) Oral every 6 hours  artificial  tears Solution 1 Drop(s) Both EYES three times a day  aspirin 325 milliGRAM(s) Oral daily  cefepime  Injectable. 1000 milliGRAM(s) IV Push every 8 hours  cholecalciferol 2000 Unit(s) Oral daily  heparin  Injectable 5000 Unit(s) SubCutaneous every 12 hours  levothyroxine 75 MICROGram(s) Oral daily  losartan 25 milliGRAM(s) Oral daily  metoprolol succinate ER 50 milliGRAM(s) Oral two times a day  multivitamin/minerals 1 Tablet(s) Oral daily  pantoprazole    Tablet 40 milliGRAM(s) Oral before breakfast  tamsulosin 0.4 milliGRAM(s) Oral at bedtime    MEDICATIONS  (PRN):  ALPRAZolam 0.125 milliGRAM(s) Oral two times a day PRN anxiety  docusate sodium 100 milliGRAM(s) Oral three times a day PRN Constipation  magnesium hydroxide Suspension 30 milliLiter(s) Oral daily PRN Constipation  senna 2 Tablet(s) Oral at bedtime PRN Constipation      A/P: 89 year old woman who presents with weakness found to have UTI.    Generalized weakness with bladder cancer and verma catheter associated sepsis UTI:   -bladder cancer w/ recent TURBT / bladder biopsies and recent verma removal  -sepsis resolved  -pt still with significant weakness, worsening -she needs POOL.   -UCx showing enterobacter cloacae  -BCx likely contaminate with repeat cultures no growth  -antibiotic therapy with cefepime per ID.  Stop upon discharge per recommendations.     Urinary retention:  -high post void residuals  -verma re-placed 6/22 per urology.  Do not remove until outpatient follow up.  -trial of flomax (monitor for any allergic reactions)    Constipation: c/w bowel regimen    HTN with episode of vasovagal: Due to orthostatics and labile BP (has hx of this)  -EKG reviewed, no new changes compared to old.  -cardiac eval appreciated, losartan added.  metoprolol changed from BID to QHS.  -follow up outpatient Dr. Sánchez     *Hyponatremia: RESOLVED    *CAD s/p PCI / dyslipidemia / hypothyroidism / anxiety / orthostatic hypotension / DJD / anxiety / uterine cancer  -C/w home meds and if conditions remain stable f/u outpatient for further management     *Moderate Protein Calorie Malnutrition  -albumin 2.8  -supplement as able    Dispo: to POOL swain [facility] Dysphagia, unspecified type

## 2019-09-17 NOTE — PROGRESS NOTE ADULT - SUBJECTIVE AND OBJECTIVE BOX
DIABETES FOLLOW UP NOTE: Saw pt earlier today  INTERVAL HX: 79 M w/h/o tightly controlled T2DM with frequent hypoglycemia. Also h/o  metastatic colon Ca, HTN, HLD, hypothyroidism, acromegaly on Sandostatin, here after found by wife unresponsive and then with AMS due to hypoglycemic episode requiring intubation for agitation and urgent CT head, now s/p extubation and pt stable. Pt reports tolerating POs with glycemic control at goal today. Fasting BG dropping from bedtime values. No further hypoglycemia noted while in hospital. Pt reports having trouble voiding. Had constipation but had BMs yesterday after enema.     Review of Systems:  General: As above  Cardiovascular: No chest pain, palpitations  Respiratory: No SOB, no cough  GI: No nausea, vomiting, abdominal pain  Endocrine: no polyuria, polydipsia or S&Sx of hypoglycemia    Allergies    No Known Allergies    Intolerances      MEDICATIONS:  atorvastatin 10 milliGRAM(s) Oral at bedtime  insulin glargine Injectable (LANTUS) 5 Unit(s) SubCutaneous at bedtime  insulin lispro (HumaLOG) corrective regimen sliding scale   SubCutaneous three times a day before meals  insulin lispro (HumaLOG) corrective regimen sliding scale   SubCutaneous at bedtime  insulin lispro Injectable (HumaLOG) 5 Unit(s) SubCutaneous three times a day before meals  levothyroxine 150 MICROGram(s) Oral daily    PHYSICAL EXAM:  Vital Signs Last 24 Hrs  T(C): 36.4 (09-17-19 @ 11:08), Max: 36.8 (09-16-19 @ 19:21)  T(F): 97.6 (09-17-19 @ 11:08), Max: 98.2 (09-16-19 @ 19:21)  HR: 57 (09-17-19 @ 11:08) (57 - 64)  BP: 128/68 (09-17-19 @ 11:08) (128/68 - 176/84)  BP(mean): --  RR: 18 (09-17-19 @ 11:08) (18 - 18)  SpO2: 97% (09-17-19 @ 11:08) (97% - 99%)  GENERAL: Male laying in bed in NAD  Abdomen: Soft, nontender, non distended. SQ "chemo pump" in LLQ, Noted abdominal hernia as well.  Extremities: Warm, no edema in all 4 exts  NEURO: A&O X3    LABS:  POCT Blood Glucose.: 185 mg/dL (09-17-19 @ 12:18)  POCT Blood Glucose.: 126 mg/dL (09-17-19 @ 08:06)  POCT Blood Glucose.: 169 mg/dL (09-17-19 @ 02:11)  POCT Blood Glucose.: 220 mg/dL (09-16-19 @ 22:08)  POCT Blood Glucose.: 261 mg/dL (09-16-19 @ 17:07)  POCT Blood Glucose.: 345 mg/dL (09-16-19 @ 12:28)  POCT Blood Glucose.: 180 mg/dL (09-16-19 @ 08:13)  POCT Blood Glucose.: 305 mg/dL (09-15-19 @ 21:59)  POCT Blood Glucose.: 284 mg/dL (09-15-19 @ 17:00)                          11.7   6.19  )-----------( 128      ( 17 Sep 2019 08:29 )             33.4     09-17    138  |  106  |  16  ----------------------------<  160<H>  3.6   |  23  |  0.88    Ca    8.3<L>      17 Sep 2019 07:13  Phos  2.6     09-17  Mg     1.6     09-17    Thyroid Function Tests:  09-13 @ 10:06 TSH 18.20 FreeT4 -- T3 -- Anti TPO -- Anti Thyroglobulin Ab -- TSI --  09-11 @ 08:32 TSH 14.10 FreeT4 1.2 T3 -- Anti TPO -- Anti Thyroglobulin Ab -- TSI --      Hemoglobin A1C, Whole Blood: 7.0 % <H> [4.0 - 5.6] (09-13-19 @ 10:09)  Hemoglobin A1C, Whole Blood: 6.7 % <H> [4.0 - 5.6] (07-18-19 @ 09:50)  C-Peptide, Serum: 0.2 ng/mL (09-14-19 @ 09:57) with BG >200s

## 2019-09-17 NOTE — PROGRESS NOTE ADULT - PROBLEM SELECTOR PLAN 2
-Pt was not taking synthroid in an empty stomach so will continue present  Synthroid dose.  -Re test TSH in 4 to 6 weeks  -Plan discussed with pt/team.  Contact info: [phone] (24/7). pager [phone]

## 2019-09-17 NOTE — PROGRESS NOTE ADULT - PROBLEM SELECTOR PLAN 4
Resolved. Pre-renal is most likely etiology given FeNa. Not on new nephrotoxic medications. Possibly obstructive/post-renal as well. Cr decreasing.

## 2019-09-17 NOTE — PROGRESS NOTE ADULT - PROBLEM SELECTOR PLAN 3
-Lantus 5u qhs, per endo  -Humalog 6u premeal, per endo  -Endocrine following, C-peptide low 0.2, endocrine to decide home medication regimen for discharge  -Dispo on insulin, no oral agents b/c c-peptide low  -Endo arranging for glucagon pen -Lantus 4u qhs, per endo  -Humalog 5u premeal, per endo  -Endocrine following, C-peptide low 0.2, endocrine to decide home medication regimen for discharge  -Dispo on insulin, no oral agents b/c c-peptide low  -Endo arranging for glucagon pen

## 2019-09-17 NOTE — PROGRESS NOTE ADULT - PROBLEM SELECTOR PLAN 6
At home, losartan 100 mg, carvedilol 25 mg, amlodipine 2.5 mg, lasix 20 mg, and spironolactone.  - C/w amlodipine and carvedilol.    - Holding losartan, spironolactone, and lasix in view of recent DERECK. At home, losartan 100 mg, carvedilol 25 mg, amlodipine 2.5 mg, lasix 20 mg, and spironolactone.  - C/w amlodipine and carvedilol. Consider uptitrating if necessary.     - Holding losartan, spironolactone, and lasix in view of recent DERECK.

## 2019-09-17 NOTE — PROGRESS NOTE ADULT - SUBJECTIVE AND OBJECTIVE BOX
INTERVAL EVENTS: Large BM after tap water enema. Trial of void started at 4am. Straight cath this morning for 420 ml on bladder scan    SUBJECTIVE: No acute complaints    ROS:  General - No fevers or chills  Cards - No chest pain or palpitations  Pulm - No cough or shortness of breath  GI - No abdominal pain. Last BM was last night   - No dysuria or hematuria    OBJECTIVE:  Vitals Last 24 hrs  Vital Signs Last 24 Hrs  T(C): 36.8 (17 Sep 2019 05:24), Max: 36.8 (16 Sep 2019 19:21)  T(F): 98.2 (17 Sep 2019 05:24), Max: 98.2 (16 Sep 2019 19:21)  HR: 64 (17 Sep 2019 05:24) (58 - 64)  BP: 150/73 (17 Sep 2019 05:24) (145/78 - 176/84)  BP(mean): --  RR: 18 (17 Sep 2019 05:24) (18 - 18)  SpO2: 99% (17 Sep 2019 05:24) (96% - 99%)    IOs  I&O's Summary    16 Sep 2019 07:01  -  17 Sep 2019 07:00  --------------------------------------------------------  IN: 1000 mL / OUT: 350 mL / NET: 650 mL    17 Sep 2019 07:01  -  17 Sep 2019 10:30  --------------------------------------------------------  IN: 0 mL / OUT: 200 mL / NET: -200 mL        PE:  General - Appears well. In bed sleeping no acute distress.  Heart - Normal S1 and S2. No murmurs auscultated.  Lungs - Clear to auscultation bilaterally  GI - No suprapubic tenderness. LLQ device palpable.  Extremities - No lower extremity edema. Peripheral pulses intact. Calves nontender.  Skin - No rashes on extremities    LABS:  09-17    138  |  106  |  16  ----------------------------<  160<H>  3.6   |  23  |  0.88    Ca    8.3<L>      17 Sep 2019 07:13  Phos  2.6     09-17  Mg     1.6     09-17                              11.7   6.19  )-----------( 128      ( 17 Sep 2019 08:29 )             33.4           IMAGING:

## 2019-09-17 NOTE — PROGRESS NOTE ADULT - ASSESSMENT
79 M w/h/o tightly controlled T2DM with frequent hypoglycemia. Also h/o metastatic colon Ca, HTN, HLD, hypothyroidism, acromegaly on Sandostatin, here after found by wife unresponsive and then with AMS due to hypoglycemic episode requiring intubation for agitation and urgent CT head, now s/p extubation and pt stable. Tolerating POs with glycemic control improved. FBG still going down from HS values. However, pt has low c-peptide levels which means he still needs basal insulin on board. Will decrease lantus dose further and continue with pre meal insulin doses. Glycemic goal for this pt's age and comorbidities is 100s to low 200s and a1c 7% -8%.

## 2019-09-17 NOTE — PROGRESS NOTE ADULT - PROBLEM SELECTOR PLAN 7
Required Heimlich maneuver 2 times in past month  - Passed swallow study, able to take PO meds and meals without restriction  Colon cancer metastatic to liver and lung. s/p Xeroda and Avastin  - Currently stable  - Holding Chemo meds

## 2019-09-18 ENCOUNTER — TRANSCRIPTION ENCOUNTER (OUTPATIENT)
Age: 79
End: 2019-09-18

## 2019-09-18 VITALS
OXYGEN SATURATION: 97 % | HEART RATE: 59 BPM | RESPIRATION RATE: 18 BRPM | DIASTOLIC BLOOD PRESSURE: 79 MMHG | SYSTOLIC BLOOD PRESSURE: 157 MMHG | TEMPERATURE: 98 F

## 2019-09-18 LAB
GLUCOSE BLDC GLUCOMTR-MCNC: 111 MG/DL — HIGH (ref 70–99)
GLUCOSE BLDC GLUCOMTR-MCNC: 130 MG/DL — HIGH (ref 70–99)
GLUCOSE BLDC GLUCOMTR-MCNC: 150 MG/DL — HIGH (ref 70–99)
GLUCOSE BLDC GLUCOMTR-MCNC: 182 MG/DL — HIGH (ref 70–99)

## 2019-09-18 PROCEDURE — 71045 X-RAY EXAM CHEST 1 VIEW: CPT | Mod: 26

## 2019-09-18 PROCEDURE — 99239 HOSP IP/OBS DSCHRG MGMT >30: CPT

## 2019-09-18 PROCEDURE — 99232 SBSQ HOSP IP/OBS MODERATE 35: CPT

## 2019-09-18 RX ORDER — SPIRONOLACTONE 25 MG/1
1 TABLET, FILM COATED ORAL
Qty: 0 | Refills: 0 | DISCHARGE

## 2019-09-18 RX ORDER — INSULIN GLARGINE 100 [IU]/ML
4 INJECTION, SOLUTION SUBCUTANEOUS
Qty: 0 | Refills: 0 | DISCHARGE

## 2019-09-18 RX ORDER — INSULIN ASPART 100 [IU]/ML
1 INJECTION, SOLUTION SUBCUTANEOUS
Qty: 0 | Refills: 0 | DISCHARGE

## 2019-09-18 RX ORDER — GLUCAGON INJECTION, SOLUTION 0.5 MG/.1ML
1 INJECTION, SOLUTION SUBCUTANEOUS
Qty: 2 | Refills: 0
Start: 2019-09-18

## 2019-09-18 RX ORDER — POTASSIUM CHLORIDE 20 MEQ
0 PACKET (EA) ORAL
Qty: 0 | Refills: 0 | DISCHARGE

## 2019-09-18 RX ORDER — TAMSULOSIN HYDROCHLORIDE 0.4 MG/1
2 CAPSULE ORAL
Qty: 0 | Refills: 0 | DISCHARGE
Start: 2019-09-18

## 2019-09-18 RX ORDER — LOSARTAN POTASSIUM 100 MG/1
1 TABLET, FILM COATED ORAL
Qty: 0 | Refills: 0 | DISCHARGE

## 2019-09-18 RX ORDER — INSULIN GLARGINE 100 [IU]/ML
14 INJECTION, SOLUTION SUBCUTANEOUS
Qty: 0 | Refills: 0 | DISCHARGE

## 2019-09-18 RX ORDER — CAPECITABINE 500 MG/1
0 TABLET ORAL
Qty: 0 | Refills: 0 | DISCHARGE

## 2019-09-18 RX ORDER — INSULIN ASPART 100 [IU]/ML
5 INJECTION, SOLUTION SUBCUTANEOUS
Qty: 5 | Refills: 0
Start: 2019-09-18 | End: 2019-10-17

## 2019-09-18 RX ORDER — BEVACIZUMAB 400 MG/16ML
0 INJECTION, SOLUTION INTRAVENOUS
Qty: 0 | Refills: 0 | DISCHARGE

## 2019-09-18 RX ADMIN — Medication 5 UNIT(S): at 13:02

## 2019-09-18 RX ADMIN — AMLODIPINE BESYLATE 2.5 MILLIGRAM(S): 2.5 TABLET ORAL at 05:29

## 2019-09-18 RX ADMIN — ENOXAPARIN SODIUM 40 MILLIGRAM(S): 100 INJECTION SUBCUTANEOUS at 12:59

## 2019-09-18 RX ADMIN — POLYETHYLENE GLYCOL 3350 17 GRAM(S): 17 POWDER, FOR SOLUTION ORAL at 13:07

## 2019-09-18 RX ADMIN — Medication 100 MILLIGRAM(S): at 13:07

## 2019-09-18 RX ADMIN — Medication 150 MICROGRAM(S): at 05:30

## 2019-09-18 RX ADMIN — CARVEDILOL PHOSPHATE 25 MILLIGRAM(S): 80 CAPSULE, EXTENDED RELEASE ORAL at 05:29

## 2019-09-18 RX ADMIN — Medication 100 MILLIGRAM(S): at 05:30

## 2019-09-18 RX ADMIN — Medication 5 UNIT(S): at 08:27

## 2019-09-18 RX ADMIN — Medication 5 UNIT(S): at 17:59

## 2019-09-18 RX ADMIN — Medication 1 TABLET(S): at 13:07

## 2019-09-18 RX ADMIN — CARVEDILOL PHOSPHATE 25 MILLIGRAM(S): 80 CAPSULE, EXTENDED RELEASE ORAL at 17:58

## 2019-09-18 NOTE — PROGRESS NOTE ADULT - PROBLEM SELECTOR PROBLEM 1
Encephalopathy, unspecified
Hypoglycemia associated with type 2 diabetes mellitus
Encephalopathy, unspecified
Hypoglycemia associated with type 2 diabetes mellitus
Encephalopathy, unspecified

## 2019-09-18 NOTE — PROGRESS NOTE ADULT - PROBLEM SELECTOR PLAN 3
-Lantus 4u qhs, per endo  -Humalog 5u premeal, per endo  -Endocrine following, C-peptide low 0.2, endocrine to decide home medication regimen for discharge  -Dispo on insulin, no oral agents b/c c-peptide low  -Endo prescribed glucagon pen -Lantus 4u qhs, per endo  -Humalog 5u premeal, per endo  -Endocrine following, C-peptide low 0.2, endocrine to decide home medication regimen for discharge  -Dispo on insulin, no oral agents b/c c-peptide low  -Glucagon pen per endo -Lantus 4u qhs, per endo  -Humalog 5u premeal, per endo  -Endocrine following, C-peptide low 0.2, endocrine to decide home medication regimen for discharge  -Dispo on insulin, no oral agents b/c c-peptide low  -Glucagon pen per endo  -Endo/PMD follow up outpatient.

## 2019-09-18 NOTE — PROGRESS NOTE ADULT - SUBJECTIVE AND OBJECTIVE BOX
INTERVAL EVENTS: Failed ToV. Cathed for 780 mL. Rahman placed.    SUBJECTIVE: Had a small, well formed BM. He finds it odd that he is constipated, used to go every day.    ROS:  General - No fevers or chills  Cards - No chest pain or palpitations  Pulm - No cough or shortness of breath  GI - No abdominal pain, diarrhea, melena, or hematochezia. Last BM was this AM.   - No dysuria or hematuria    OBJECTIVE:  Vitals Last 24 hrs  Vital Signs Last 24 Hrs  T(C): 36.3 (18 Sep 2019 05:17), Max: 36.4 (17 Sep 2019 11:08)  T(F): 97.4 (18 Sep 2019 05:17), Max: 97.6 (17 Sep 2019 11:08)  HR: 74 (18 Sep 2019 05:17) (57 - 74)  BP: 143/76 (18 Sep 2019 05:17) (128/68 - 171/81)  BP(mean): --  RR: 18 (18 Sep 2019 05:17) (18 - 18)  SpO2: 98% (18 Sep 2019 05:17) (97% - 98%)    IOs  I&O's Summary    17 Sep 2019 07:01  -  18 Sep 2019 07:00  --------------------------------------------------------  IN: 700 mL / OUT: 1400 mL / NET: -700 mL        PE:  General - Well appearing, reading a news paper in chair.  Heart - Normal S1 and S2. No murmurs auscultated.  Lungs - Clear to auscultation bilaterally  GI - Abdomen soft, NT. Device in LLQ. No palpable stool.  Extremities - No lower extremity edema. Peripheral pulses intact. Calves nontender.  Skin - No rashes on extremities    LABS:  09-17    138  |  106  |  16  ----------------------------<  160<H>  3.6   |  23  |  0.88    Ca    8.3<L>      17 Sep 2019 07:13  Phos  2.6     09-17  Mg     1.6     09-17                              11.7   6.19  )-----------( 128      ( 17 Sep 2019 08:29 )             33.4     No AM labs today      IMAGING: No new imaging

## 2019-09-18 NOTE — PROGRESS NOTE ADULT - PROBLEM SELECTOR PLAN 5
TSH 14.1 with T4 1.2. Repeat TSH 18.2. Likely sick euthyroid  - Continue home levothyroxine 150 mcg  - Endocrinology following, appreciate recs  -Out pt tsh and t4

## 2019-09-18 NOTE — PROGRESS NOTE ADULT - PROBLEM SELECTOR PLAN 6
At home, losartan 100 mg, carvedilol 25 mg, amlodipine 2.5 mg, lasix 20 mg, and spironolactone.  - C/w amlodipine and carvedilol. Consider uptitrating if necessary.  - Holding losartan, spironolactone, and lasix in view of recent DERECK.  - Restart home BP medications per PCP At home, losartan 100 mg, carvedilol 25 mg, amlodipine 2.5 mg, lasix 20 mg, and spironolactone.  - C/w amlodipine and carvedilol. Consider uptitrating if necessary.  - Holding losartan, spironolactone, and lasix in view of recent DERECK.  - Restart home BP medications per PCP; can be decided outpatient after follow up.

## 2019-09-18 NOTE — DISCHARGE NOTE NURSING/CASE MANAGEMENT/SOCIAL WORK - NSDCFUADDAPPT_GEN_ALL_CORE_FT
Please schedule a hospital follow up appoint with Dr. Roman within 1 week at [phone]    Please schedule an appointment with Dr. Larson or any provider anytime between Friday 9/19-9/25 to have your parekh removed and follow up for your urinary retention.

## 2019-09-18 NOTE — PROGRESS NOTE ADULT - SUBJECTIVE AND OBJECTIVE BOX
DIABETES FOLLOW UP NOTE: Saw pt earlier today  INTERVAL HX: 79 M w/h/o tightly controlled T2DM with frequent hypoglycemia. Also h/o  metastatic colon Ca, HTN, HLD, hypothyroidism, acromegaly on Sandostatin, here after found by wife unresponsive and then with AMS due to hypoglycemic episode requiring intubation for agitation and urgent CT head, now s/p extubation and pt stable. Pt reports tolerating POs with glycemic control at goal. No further hypoglycemia noted while in hospital. Pt reports having trouble voiding > going home today with FC.     Review of Systems:  General: As above  Cardiovascular: No chest pain, palpitations  Respiratory: No SOB, no cough  GI: No nausea, vomiting, abdominal pain  Endocrine: no polyuria, polydipsia or S&Sx of hypoglycemia    Allergies    No Known Allergies    Intolerances      MEDICATIONS:  atorvastatin 10 milliGRAM(s) Oral at bedtime  insulin glargine Injectable (LANTUS) 4 Unit(s) SubCutaneous at bedtime  insulin lispro (HumaLOG) corrective regimen sliding scale   SubCutaneous three times a day before meals  insulin lispro (HumaLOG) corrective regimen sliding scale   SubCutaneous at bedtime  insulin lispro Injectable (HumaLOG) 5 Unit(s) SubCutaneous three times a day before meals  levothyroxine 150 MICROGram(s) Oral daily    PHYSICAL EXAM:  Vital Signs Last 24 Hrs  T(C): 36.6 (09-18-19 @ 11:18), Max: 36.6 (09-18-19 @ 11:18)  T(F): 97.9 (09-18-19 @ 11:18), Max: 97.9 (09-18-19 @ 11:18)  HR: 59 (09-18-19 @ 11:18) (59 - 74)  BP: 157/79 (09-18-19 @ 11:18) (143/76 - 171/81)  BP(mean): --  RR: 18 (09-18-19 @ 11:18) (18 - 18)  SpO2: 97% (09-18-19 @ 11:18) (97% - 98%)  GENERAL: Male laying in bed in NAD  Abdomen: Soft, nontender, non distended. SQ "chemo pump" in LLQ, Noted abdominal hernia as well.  Extremities: Warm, no edema in all 4 exts  NEURO: A&O X3    LABS:  POCT Blood Glucose.: 182 mg/dL (09-18-19 @ 11:58)  POCT Blood Glucose.: 150 mg/dL (09-18-19 @ 08:05)  POCT Blood Glucose.: 111 mg/dL (09-18-19 @ 02:45)  POCT Blood Glucose.: 114 mg/dL (09-17-19 @ 22:28)  POCT Blood Glucose.: 148 mg/dL (09-17-19 @ 17:14)  POCT Blood Glucose.: 185 mg/dL (09-17-19 @ 12:18)  POCT Blood Glucose.: 126 mg/dL (09-17-19 @ 08:06)  POCT Blood Glucose.: 169 mg/dL (09-17-19 @ 02:11)  POCT Blood Glucose.: 220 mg/dL (09-16-19 @ 22:08)  POCT Blood Glucose.: 261 mg/dL (09-16-19 @ 17:07)                                   11.7   6.19  )-----------( 128      ( 17 Sep 2019 08:29 )             33.4     09-17    138  |  106  |  16  ----------------------------<  160<H>  3.6   |  23  |  0.88    Ca    8.3<L>      17 Sep 2019 07:13  Phos  2.6     09-17  Mg     1.6     09-17      Thyroid Function Tests:  09-13 @ 10:06 TSH 18.20 FreeT4 -- T3 -- Anti TPO -- Anti Thyroglobulin Ab -- TSI --  09-11 @ 08:32 TSH 14.10 FreeT4 1.2 T3 -- Anti TPO -- Anti Thyroglobulin Ab -- TSI --      Hemoglobin A1C, Whole Blood: 7.0 % <H> [4.0 - 5.6] (09-13-19 @ 10:09)  Hemoglobin A1C, Whole Blood: 6.7 % <H> [4.0 - 5.6] (07-18-19 @ 09:50)  C-Peptide, Serum: 0.2 ng/mL (09-14-19 @ 09:57) with BG >200s

## 2019-09-18 NOTE — PROGRESS NOTE ADULT - ASSESSMENT
79 M w/h/o tightly controlled T2DM with frequent hypoglycemia. Also h/o metastatic colon Ca, HTN, HLD, hypothyroidism, acromegaly on Sandostatin, here after found by wife unresponsive and then with AMS due to hypoglycemic episode requiring intubation for agitation and urgent CT head, now s/p extubation and pt stable. Tolerating POs with glycemic control now at goal without hypoglycemia. Glycemic goal for this pt's age and comorbidities is 100s to low 200s and a1c 7% -8%.   Spoke to pt's endocrinologist Dr Fraire at . MD aware of DM discharge plan of care as well as the need for CGM. Per MD he will f/u as out pt and likely Rx Freestyle Staci 14 since he feels pt unable to use a CGM. Also spoke to pt and wife about plan of care and answered all their questions. They have this provider contact info in case of any further questions. Pt requesting Basaglar in am instead of HS

## 2019-09-18 NOTE — PROGRESS NOTE ADULT - PROBLEM SELECTOR PLAN 7
Required Heimlich maneuver 2 times in past month  - Passed swallow study, able to take PO meds and meals without restriction  Colon cancer metastatic to liver and lung. s/p Xeroda and Avastin  - Currently stable  - Holding Chemo meds Required Heimlich maneuver 2 times in past month  - Passed swallow study, able to take PO meds and meals without restriction  Colon cancer metastatic to liver and lung. s/p Xeroda and Avastin  - Currently stable  - Holding Chemo meds  -Oncology follow up outpatient.

## 2019-09-18 NOTE — DISCHARGE NOTE NURSING/CASE MANAGEMENT/SOCIAL WORK - PATIENT PORTAL LINK FT
You can access the FollowMyHealth Patient Portal offered by [facility] by registering at the following website: [email or web address]. By joining [facility]’s FollowMyHealth portal, you will also be able to view your health information using other applications (apps) compatible with our system.

## 2019-09-18 NOTE — PROGRESS NOTE ADULT - PROBLEM SELECTOR PLAN 2
-Pt was not taking synthroid in an empty stomach so will continue present  Synthroid dose.  -Re test TSH in 4 to 6 weeks  -Plan discussed with pt/team.  Contact info: [phone] (24/7). pager [phone]

## 2019-09-18 NOTE — CHART NOTE - NSCHARTNOTEFT_GEN_A_CORE
Pt  with symptomatic urinary retention this hospitalization most recently 780 overnight while trying to stand to urinate. Constipation was an issue but had large BM yesterday. Flomax increased by primary team.  Pt and wife scared about going home with parekh. He has seen Dr. Jesus Larson and Dr. Derrell Hurst in 2017 at [facility]  He may follow up as early as Friday. All questions answered.

## 2019-09-18 NOTE — PROGRESS NOTE ADULT - PROBLEM SELECTOR PLAN 2
New onset with admission. Constipation contributing.  -Failed ToV. Rahman in place.  -C/w tamsulosin 0.8 mg qhs New onset with admission. Constipation contributing.  -Failed ToV. Rahman in place.  -C/w tamsulosin 0.8 mg qhs  -Outpatient urology follow up upon discharge.

## 2019-09-18 NOTE — PROGRESS NOTE ADULT - ATTENDING COMMENTS
-Patient seen/examined on 9/18/19. Case/plan discussed with the intern and resident as reviewed/edited by me above and in any comments below.  -Updated patient and his wife at bedside.  -Patient stable for DC to home today with outpatient follow up. -35 minutes spent on the discharge process.

## 2019-09-18 NOTE — PROGRESS NOTE ADULT - PROVIDER SPECIALTY LIST ADULT
Endocrinology
Internal Medicine
MICU
Endocrinology
Internal Medicine

## 2019-09-18 NOTE — PROGRESS NOTE ADULT - PROBLEM SELECTOR PLAN 1
DISCHARGE  -Test BG ac and hs   -Basaglar 4 units q hs  -Novolog 5 units ac meals  -Discontinue low dose Humalog correction scales ac and hs upon discharge   -Pt to f/u with pvt endo Dr Fraire for further insulin adjustments and to consider CGM.  -Rx for Xeris Glucagon ready pen upon discharge. Please also RX Glucagon Emergency kit # 2 in case pharmacy doesn't carry Xeris pen  -Plan discussed with pt/wife /team and endocrinologist.  Contact info: [phone] (24/7). pager [phone]

## 2019-09-19 PROBLEM — H91.90 UNSPECIFIED HEARING LOSS, UNSPECIFIED EAR: Chronic | Status: ACTIVE | Noted: 2019-09-10

## 2019-09-19 RX ORDER — TAMSULOSIN HYDROCHLORIDE 0.4 MG/1
2 CAPSULE ORAL
Qty: 60 | Refills: 0
Start: 2019-09-19 | End: 2019-10-18

## 2019-09-23 ENCOUNTER — APPOINTMENT (OUTPATIENT)
Dept: UROLOGY | Facility: CLINIC | Age: 79
End: 2019-09-23
Payer: MEDICARE

## 2019-09-23 DIAGNOSIS — R33.8 OTHER RETENTION OF URINE: ICD-10-CM

## 2019-09-23 DIAGNOSIS — R97.20 ELEVATED PROSTATE, SPECIFIC ANTIGEN [PSA]: ICD-10-CM

## 2019-09-23 PROCEDURE — 99214 OFFICE O/P EST MOD 30 MIN: CPT

## 2019-09-23 NOTE — PHYSICAL EXAM
[General Appearance - Well Developed] : well developed [General Appearance - Well Nourished] : well nourished [Normal Appearance] : normal appearance [Abdomen Soft] : soft [Abdomen Tenderness] : non-tender [Edema] : no peripheral edema [Respiration, Rhythm And Depth] : normal respiratory rhythm and effort [] : no respiratory distress [Exaggerated Use Of Accessory Muscles For Inspiration] : no accessory muscle use [Oriented To Time, Place, And Person] : oriented to person, place, and time [Affect] : the affect was normal [Not Anxious] : not anxious [FreeTextEntry1] : uses walker for ambulation

## 2019-09-23 NOTE — ASSESSMENT
[FreeTextEntry1] : Assessment/Plan: 78 yo male with urinary retention on flomax for last 5 day.\par \par 1. TOV today\par 2 Follow up 2 weeks for PVR.

## 2019-09-23 NOTE — HISTORY OF PRESENT ILLNESS
[FreeTextEntry1] : Mr Macias is an 78 yo male that presents for evaluation for urinary retention and insertion of Rahman while he was recently being treated in the hosptial.  He denies any history of urinary issues including Hematuria, dysuria, frequency, urgency and UTI's.  He does have nocturia 1 to 2 times a night prior to having the Rahman.  He was placed on Tamsulosin while inpatient and is currently on 0.8 mg for the past 5 days.

## 2019-10-01 PROCEDURE — 97166 OT EVAL MOD COMPLEX 45 MIN: CPT

## 2019-10-01 PROCEDURE — 94003 VENT MGMT INPAT SUBQ DAY: CPT

## 2019-10-01 PROCEDURE — 82435 ASSAY OF BLOOD CHLORIDE: CPT

## 2019-10-01 PROCEDURE — 70498 CT ANGIOGRAPHY NECK: CPT

## 2019-10-01 PROCEDURE — 94002 VENT MGMT INPAT INIT DAY: CPT

## 2019-10-01 PROCEDURE — 71045 X-RAY EXAM CHEST 1 VIEW: CPT

## 2019-10-01 PROCEDURE — 70553 MRI BRAIN STEM W/O & W/DYE: CPT

## 2019-10-01 PROCEDURE — 84439 ASSAY OF FREE THYROXINE: CPT

## 2019-10-01 PROCEDURE — 83036 HEMOGLOBIN GLYCOSYLATED A1C: CPT

## 2019-10-01 PROCEDURE — 81001 URINALYSIS AUTO W/SCOPE: CPT

## 2019-10-01 PROCEDURE — 80048 BASIC METABOLIC PNL TOTAL CA: CPT

## 2019-10-01 PROCEDURE — 80053 COMPREHEN METABOLIC PANEL: CPT

## 2019-10-01 PROCEDURE — A9585: CPT

## 2019-10-01 PROCEDURE — 99291 CRITICAL CARE FIRST HOUR: CPT | Mod: 25

## 2019-10-01 PROCEDURE — 82565 ASSAY OF CREATININE: CPT

## 2019-10-01 PROCEDURE — 84300 ASSAY OF URINE SODIUM: CPT

## 2019-10-01 PROCEDURE — 51701 INSERT BLADDER CATHETER: CPT | Mod: XU

## 2019-10-01 PROCEDURE — 97530 THERAPEUTIC ACTIVITIES: CPT

## 2019-10-01 PROCEDURE — 87040 BLOOD CULTURE FOR BACTERIA: CPT

## 2019-10-01 PROCEDURE — 84681 ASSAY OF C-PEPTIDE: CPT

## 2019-10-01 PROCEDURE — 82570 ASSAY OF URINE CREATININE: CPT

## 2019-10-01 PROCEDURE — 92526 ORAL FUNCTION THERAPY: CPT

## 2019-10-01 PROCEDURE — 82330 ASSAY OF CALCIUM: CPT

## 2019-10-01 PROCEDURE — 87086 URINE CULTURE/COLONY COUNT: CPT

## 2019-10-01 PROCEDURE — 97162 PT EVAL MOD COMPLEX 30 MIN: CPT

## 2019-10-01 PROCEDURE — 95816 EEG AWAKE AND DROWSY: CPT

## 2019-10-01 PROCEDURE — 97116 GAIT TRAINING THERAPY: CPT

## 2019-10-01 PROCEDURE — 70450 CT HEAD/BRAIN W/O DYE: CPT

## 2019-10-01 PROCEDURE — 84145 PROCALCITONIN (PCT): CPT

## 2019-10-01 PROCEDURE — 85027 COMPLETE CBC AUTOMATED: CPT

## 2019-10-01 PROCEDURE — 82947 ASSAY GLUCOSE BLOOD QUANT: CPT

## 2019-10-01 PROCEDURE — 82803 BLOOD GASES ANY COMBINATION: CPT

## 2019-10-01 PROCEDURE — 85730 THROMBOPLASTIN TIME PARTIAL: CPT

## 2019-10-01 PROCEDURE — 85610 PROTHROMBIN TIME: CPT

## 2019-10-01 PROCEDURE — 84295 ASSAY OF SERUM SODIUM: CPT

## 2019-10-01 PROCEDURE — 84100 ASSAY OF PHOSPHORUS: CPT

## 2019-10-01 PROCEDURE — 70496 CT ANGIOGRAPHY HEAD: CPT

## 2019-10-01 PROCEDURE — 85014 HEMATOCRIT: CPT

## 2019-10-01 PROCEDURE — 93005 ELECTROCARDIOGRAM TRACING: CPT | Mod: XU

## 2019-10-01 PROCEDURE — 82962 GLUCOSE BLOOD TEST: CPT

## 2019-10-01 PROCEDURE — 82140 ASSAY OF AMMONIA: CPT

## 2019-10-01 PROCEDURE — 84132 ASSAY OF SERUM POTASSIUM: CPT

## 2019-10-01 PROCEDURE — 31500 INSERT EMERGENCY AIRWAY: CPT

## 2019-10-01 PROCEDURE — 82533 TOTAL CORTISOL: CPT

## 2019-10-01 PROCEDURE — 92610 EVALUATE SWALLOWING FUNCTION: CPT

## 2019-10-01 PROCEDURE — 74018 RADEX ABDOMEN 1 VIEW: CPT

## 2019-10-01 PROCEDURE — 83735 ASSAY OF MAGNESIUM: CPT

## 2019-10-01 PROCEDURE — 83605 ASSAY OF LACTIC ACID: CPT

## 2019-10-01 PROCEDURE — 84443 ASSAY THYROID STIM HORMONE: CPT

## 2019-10-01 PROCEDURE — G0515: CPT

## 2019-10-22 ENCOUNTER — APPOINTMENT (OUTPATIENT)
Dept: UROLOGY | Facility: CLINIC | Age: 79
End: 2019-10-22
Payer: MEDICARE

## 2019-10-22 DIAGNOSIS — N40.1 BENIGN PROSTATIC HYPERPLASIA WITH LOWER URINARY TRACT SYMPMS: ICD-10-CM

## 2019-10-22 DIAGNOSIS — N13.8 BENIGN PROSTATIC HYPERPLASIA WITH LOWER URINARY TRACT SYMPMS: ICD-10-CM

## 2019-10-22 PROCEDURE — 99213 OFFICE O/P EST LOW 20 MIN: CPT

## 2019-10-23 ENCOUNTER — MEDICATION RENEWAL (OUTPATIENT)
Age: 79
End: 2019-10-23

## 2019-11-26 ENCOUNTER — MEDICATION RENEWAL (OUTPATIENT)
Age: 79
End: 2019-11-26

## 2019-11-26 RX ORDER — TAMSULOSIN HYDROCHLORIDE 0.4 MG/1
0.4 CAPSULE ORAL
Qty: 180 | Refills: 3 | Status: ACTIVE | COMMUNITY
Start: 2019-10-22 | End: 1900-01-01

## 2020-01-28 NOTE — DISCHARGE NOTE PROVIDER - NSDCHHATTENDCERT_GEN_ALL_CORE
My signature below certifies that the above stated patient is homebound and upon completion of the Face-To-Face encounter, has the need for intermittent skilled nursing, physical therapy and/or speech or occupational therapy services in their home for their current diagnosis as outlined in their initial plan of care. These services will continue to be monitored by myself or another physician. 29-Jan-2020

## 2020-04-23 ENCOUNTER — APPOINTMENT (OUTPATIENT)
Dept: UROLOGY | Facility: CLINIC | Age: 80
End: 2020-04-23

## 2020-05-04 NOTE — DISCHARGE NOTE PROVIDER - NSDCQMERRANDS_GEN_ALL_CORE
distress    HENT:      Head: Atraumatic. Eyes:      Conjunctiva/sclera: Conjunctivae normal.      Pupils: Pupils are equal, round, and reactive to light. Neck:      Musculoskeletal: Neck supple. Cardiovascular:      Rate and Rhythm: Normal rate and regular rhythm. Heart sounds: Normal heart sounds. No murmur. Pulmonary:      Effort: Pulmonary effort is normal.      Breath sounds: Normal breath sounds. No wheezing. Abdominal:      General: Bowel sounds are normal.      Palpations: Abdomen is soft. Tenderness: There is no abdominal tenderness. Skin:     General: Skin is warm and dry. Capillary Refill: Capillary refill takes less than 2 seconds. Neurological:      Mental Status: He is alert and oriented to person, place, and time. Cranial Nerves: No cranial nerve deficit. LABS:    CBC:   Recent Labs     05/02/20  0815 05/03/20  0711 05/04/20  0525   WBC 9.2 12.6* 12.4*   HGB 12.7* 13.6 14.0   HCT 37.6* 40.5 42.5   MCV 84.6 84.4 85.5    151 172                                                                BMP:    Recent Labs     05/02/20  0815 05/03/20  0711 05/04/20  0525   *  127* 129* 126*   K 4.3  4.4 4.7 5.0   CL 91*  90* 94* 87*   CO2 20*  21 20* 19*   BUN 54*  53* 53* 79*   CREATININE 4.8*  4.7* 5.3* 6.9*   GLUCOSE 272*  287* 290* 325*          ABGs:   Lab Results   Component Value Date    PHART 7.328 04/29/2020    CKU7MBA 25.5 04/29/2020    PO2ART 123.0 04/29/2020       INR:   No results for input(s): INR in the last 72 hours. U/A:  No results for input(s): NITRITE, COLORU, PHUR, LABCAST, WBCUA, RBCUA, MUCUS, TRICHOMONAS, YEAST, BACTERIA, CLARITYU, SPECGRAV, LEUKOCYTESUR, UROBILINOGEN, BILIRUBINUR, BLOODU, GLUCOSEU, AMORPHOUS in the last 72 hours.     Invalid input(s): Toopher Fallon   -----------------------------------------------------------------  RAD:   VL Extremity Venous Bilateral   Final Result      XR CHEST PORTABLE   Final Result for 24h. Unclear etiology of fall- possibilities include hypoglycemia, mechanical fall, cardiac arrhythmia, infection. CTH, CT Orbits unremarkable. -Repeat EKG stable  -CT C-spine, C/A/P unremarkable  -Drug screen negative  -Echo with normal LVEF and DD, no evidence of valvular abn     Sepsis possibly 2/2 preseptal cellulitis  Unclear source- CXR, UA unremarkable. Possibly preseptal cellulitis. Presenting with HR 109, Temp 101.1, WBC 21.7, Lactic acid 2. Given 30cc/kg bolus.   -Continue doxycycline (day 7/7)  -CT C/A/P showing band-like opacities in LLL; procal 3.23  -Resp panel negative  -BC x 2, UCx NGTD    Hypertension  -Continue nifedipine  -Can give hydralazine 25 TID if SBP>180     Hyperkalemia, resolved  Patient with hyperkalemia 4/29 to 6.1. EKG stable. Received calcium gluconate. Resolved with CRRT. -Monitor daily BMP     Diabetes Mellitus 2  -Increase Lantus from 30 to 35; increase premeal insulin to 9 from 6  -Continue HDSSI     NSTEMI 2  Patient with troponin 0.15, EKG without ST changes. Denies chest pain.  -Troponins stable x 3  -Repeat EKG stable     COVID-19 negative  Given fevers, COVID-19 sent. Patient denies URI symptoms including cough, fever, shortness of breath, myalgias.   -COVID negative     Chronic Medical Diagnoses:  Parkinson disease: Continue home sinemet, primidone, ropinirole    Code Status: Full Code   FEN: Diet NPO, After Midnight  PPx: SQH  Dispo: Floor    Patient will be discussed with attending, MD Maulik Manuel MD, PGY-1  5/4/2020  8:07 AM No

## 2022-02-11 NOTE — PROGRESS NOTE ADULT - ATTENDING COMMENTS
detailed exam 79 year old male with a past medical history of metastatic colon Ca, HTN, DM presented with altered mental status, unclear etiology.     1. Encephalopathy, Acute; presumed toxic metabolic from ?relative hypoglycemia or hypothyroidism.  - Will need to f/u C.Peptide to determine outpatient dosing of insulin  2. Hypoglycemia associated with type 2 diabetes mellitus: Liberalizing BG goals. Endocrine following. Will appreciate their guidance.   3. Urinary retention: Medication list reviewed. ? if Paxil could contribute, however, this is chronic medication for him. PVR were greater than 900 on check overnight. Nursing has had issues with strait cath. Will likely require parekh, will ask urology to see today. He had not urinated yet at time of my exam ~12pm.    Rest per resident note no joint swelling/no calf tenderness details…

## 2022-04-12 NOTE — ED ADULT NURSE REASSESSMENT NOTE - RESPIRATION RHYTHM, QM
"Occupational Therapy   Initial Evaluation     Patient Name: Daniel Aleman  Age:  89 y.o., Sex:  female  Medical Record #: [id number]  Today's Date: 4/12/2022     Precautions  Precautions: Fall Risk  Comments: dementia    Assessment  Patient is 89 y.o. female with a diagnosis of GLF with pain, no fractures identified.  Pt has h/o dementia and falls per notes.  Lives with spouse per notes, unclear if any other family involved and how capable spouse is of providing direct supervision and hands on assist.  Pt required Cathy for bed mobility, and was able to stand with CGA and walk with FWW.  Self feeding supervised with setup.  Pt pocketing food, question ability to chew due to poor/lack of dentition.  RN aware and will talk to MD about possible need for SLP consult.  At this time, d/c dependent on amount of assist and capability of caregiver at home.  If spouse is able-bodied and capable of providing close supervision and hands on assist for walking with use of FWW, she may be able to go home with home health.  If he is not able to provide much assist and there are no additional caregivers/family available, she would likely benefit from further inpt post acute therapy to work on strengthening and balance for ADL's and functional mobility.  OT will follow while in house.     Plan    Recommend Occupational Therapy 3 times per week until therapy goals are met for the following treatments:  Adaptive Equipment, Neuro Re-Education / Balance, Self Care/Activities of Daily Living, Therapeutic Activities, and Therapeutic Exercises.    DC Equipment Recommendations: Unable to determine at this time  Discharge Recommendations: Recommend post-acute placement for additional occupational therapy services prior to discharge home (vs  therapy depending on how much assist is avail at home and how CAPABLE family assist is.)     Subjective    \"I don't know where I am.\"     Objective       04/12/22 0914   Prior Living Situation   Prior " regular "Services Unable To Determine At This Time   Lives with - Patient's Self Care Capacity Spouse   Comments infor per MD notes only, pt unreliable historian and  was unreachable by phone last evening   Prior Level of ADL Function   Self Feeding Unable To Determine At This Time   Grooming / Hygiene Unable To Determine At This Time   Bathing Unable To Determine At This Time   Dressing Unable To Determine At This Time   Toileting Unable To Determine At This Time   Prior Level of IADL Function   Medication Management Unable To Determine At This Time   Laundry Unable To Determine At This Time   Kitchen Mobility Unable To Determine At This Time   Finances Unable To Determine At This Time   Home Management Unable To Determine At This Time   Shopping Unable To Determine At This Time   Prior Level Of Mobility Unable to Determine At This Time  (per notes \"Pt forgets she can't walk and falls\")   Leisure Interests Unable To Determine At This Time   History of Falls   History of Falls Yes   Date of Last Fall 04/11/22  (reason for admit- GLF in kitchen)   Precautions   Precautions Fall Risk   Comments dementia   Vitals   O2 Delivery Device None - Room Air   Cognition    Cognition / Consciousness X   Orientation Level Not Oriented to Place;Not Oriented to Reason   Level of Consciousness Alert   New Learning Impaired   Comments pt with h/o dementia, forgetful, unreliable historian.  When asked questions, mostly answers \"I don't know\"  She did report she lives with spouse.  She follows simple 1 step directions fairly consistently.  Appears to have intact hearing   Active ROM Upper Body   Active ROM Upper Body  WDL   Strength Upper Body   Upper Body Strength  WDL   Comments appears grossly intact   Coordination Upper Body   Coordination WDL   Balance Assessment   Sitting Balance (Static) Fair +   Sitting Balance (Dynamic) Fair   Standing Balance (Static) Fair -   Standing Balance (Dynamic) Poor +   Weight Shift Sitting Poor "   Weight Shift Standing Fair   Comments standing balance with FWW   Bed Mobility    Supine to Sit Minimal Assist   Scooting Minimal Assist  (scoot to EOB)   ADL Assessment   Eating Supervision   Grooming Minimal Assist   Lower Body Dressing Maximal Assist   Toileting Maximal Assist  (brief, purewik.  unsure if she can initiate asking for help when needing to void)   Comments Pt was able to self feed with supervision/SBA.  Able to get food to mouth with fork, and  juice and take a sip.  She was noted to be chewing ineffectively (lack of teeth) and not clearing all food from mouth when she swallows.  RN made aware and will ask MD for SLP consult   How much help from another person does the patient currently need...   Putting on and taking off regular lower body clothing? 2   Bathing (including washing, rinsing, and drying)? 2   Toileting, which includes using a toilet, bedpan, or urinal? 2   Putting on and taking off regular upper body clothing? 3   Taking care of personal grooming such as brushing teeth? 2   Eating meals? 3   6 Clicks Daily Activity Score 14   Functional Mobility   Sit to Stand Contact Guard Assist   Bed, Chair, Wheelchair Transfer Contact Guard Assist   Mobility CGA with FWW out of room into [place] then seated in chair   Activity Tolerance   Sitting in Chair . 10 min   Sitting Edge of Bed 5 min   Standing 5 min   Patient / Family Goals   Patient / Family Goal #1 unable to state   Short Term Goals   Short Term Goal # 1 Pt will dress supervised with setup in 4 visits   Short Term Goal # 2 Pt will stand 10 min at sink to groom with CGA in 4 visits   Short Term Goal # 3 pt will toilet with Cathy in 4 visits              Alert/Awake

## 2023-11-09 NOTE — SWALLOW BEDSIDE ASSESSMENT ADULT - ORAL PHASE
,[email or web address]
Within functional limits